# Patient Record
Sex: FEMALE | Race: WHITE | NOT HISPANIC OR LATINO | Employment: FULL TIME | URBAN - METROPOLITAN AREA
[De-identification: names, ages, dates, MRNs, and addresses within clinical notes are randomized per-mention and may not be internally consistent; named-entity substitution may affect disease eponyms.]

---

## 2021-03-18 ENCOUNTER — HOSPITAL ENCOUNTER (EMERGENCY)
Facility: HOSPITAL | Age: 31
Discharge: HOME | End: 2021-03-18
Attending: EMERGENCY MEDICINE | Admitting: EMERGENCY MEDICINE

## 2021-03-18 ENCOUNTER — APPOINTMENT (OUTPATIENT)
Dept: CT IMAGING | Facility: HOSPITAL | Age: 31
End: 2021-03-18

## 2021-03-18 ENCOUNTER — APPOINTMENT (OUTPATIENT)
Dept: ULTRASOUND IMAGING | Facility: HOSPITAL | Age: 31
End: 2021-03-18

## 2021-03-18 VITALS
HEART RATE: 75 BPM | RESPIRATION RATE: 18 BRPM | SYSTOLIC BLOOD PRESSURE: 139 MMHG | BODY MASS INDEX: 35.77 KG/M2 | WEIGHT: 227.9 LBS | TEMPERATURE: 97.8 F | DIASTOLIC BLOOD PRESSURE: 98 MMHG | HEIGHT: 67 IN | OXYGEN SATURATION: 97 %

## 2021-03-18 DIAGNOSIS — N93.9 VAGINAL BLEEDING: ICD-10-CM

## 2021-03-18 DIAGNOSIS — R10.2 PELVIC PAIN: Primary | ICD-10-CM

## 2021-03-18 DIAGNOSIS — N83.201 CYST OF RIGHT OVARY: ICD-10-CM

## 2021-03-18 LAB
ALBUMIN SERPL-MCNC: 4.3 G/DL (ref 3.5–5.2)
ALBUMIN/GLOB SERPL: 1.5 G/DL
ALP SERPL-CCNC: 58 U/L (ref 39–117)
ALT SERPL W P-5'-P-CCNC: 23 U/L (ref 1–33)
ANION GAP SERPL CALCULATED.3IONS-SCNC: 9.8 MMOL/L (ref 5–15)
AST SERPL-CCNC: 13 U/L (ref 1–32)
BACTERIA UR QL AUTO: ABNORMAL /HPF
BASOPHILS # BLD AUTO: 0.06 10*3/MM3 (ref 0–0.2)
BASOPHILS NFR BLD AUTO: 0.8 % (ref 0–1.5)
BILIRUB SERPL-MCNC: 0.5 MG/DL (ref 0–1.2)
BILIRUB UR QL STRIP: NEGATIVE
BUN SERPL-MCNC: 9 MG/DL (ref 6–20)
BUN/CREAT SERPL: 10.7 (ref 7–25)
CALCIUM SPEC-SCNC: 9.3 MG/DL (ref 8.6–10.5)
CHLORIDE SERPL-SCNC: 106 MMOL/L (ref 98–107)
CLARITY UR: CLEAR
CO2 SERPL-SCNC: 26.2 MMOL/L (ref 22–29)
COLOR UR: YELLOW
CREAT SERPL-MCNC: 0.84 MG/DL (ref 0.57–1)
DEPRECATED RDW RBC AUTO: 42.5 FL (ref 37–54)
EOSINOPHIL # BLD AUTO: 0.2 10*3/MM3 (ref 0–0.4)
EOSINOPHIL NFR BLD AUTO: 2.6 % (ref 0.3–6.2)
ERYTHROCYTE [DISTWIDTH] IN BLOOD BY AUTOMATED COUNT: 13.6 % (ref 12.3–15.4)
GFR SERPL CREATININE-BSD FRML MDRD: 79 ML/MIN/1.73
GLOBULIN UR ELPH-MCNC: 2.8 GM/DL
GLUCOSE SERPL-MCNC: 84 MG/DL (ref 65–99)
GLUCOSE UR STRIP-MCNC: NEGATIVE MG/DL
HCG SERPL QL: NEGATIVE
HCT VFR BLD AUTO: 41 % (ref 34–46.6)
HGB BLD-MCNC: 13.5 G/DL (ref 12–15.9)
HGB UR QL STRIP.AUTO: NEGATIVE
HOLD SPECIMEN: NORMAL
HYALINE CASTS UR QL AUTO: ABNORMAL /LPF
IMM GRANULOCYTES # BLD AUTO: 0.02 10*3/MM3 (ref 0–0.05)
IMM GRANULOCYTES NFR BLD AUTO: 0.3 % (ref 0–0.5)
KETONES UR QL STRIP: NEGATIVE
LEUKOCYTE ESTERASE UR QL STRIP.AUTO: ABNORMAL
LIPASE SERPL-CCNC: 34 U/L (ref 13–60)
LYMPHOCYTES # BLD AUTO: 1.9 10*3/MM3 (ref 0.7–3.1)
LYMPHOCYTES NFR BLD AUTO: 25.2 % (ref 19.6–45.3)
MCH RBC QN AUTO: 28.4 PG (ref 26.6–33)
MCHC RBC AUTO-ENTMCNC: 32.9 G/DL (ref 31.5–35.7)
MCV RBC AUTO: 86.1 FL (ref 79–97)
MONOCYTES # BLD AUTO: 0.52 10*3/MM3 (ref 0.1–0.9)
MONOCYTES NFR BLD AUTO: 6.9 % (ref 5–12)
NEUTROPHILS NFR BLD AUTO: 4.85 10*3/MM3 (ref 1.7–7)
NEUTROPHILS NFR BLD AUTO: 64.2 % (ref 42.7–76)
NITRITE UR QL STRIP: NEGATIVE
NRBC BLD AUTO-RTO: 0 /100 WBC (ref 0–0.2)
PH UR STRIP.AUTO: 7 [PH] (ref 5–8)
PLATELET # BLD AUTO: 224 10*3/MM3 (ref 140–450)
PMV BLD AUTO: 9.9 FL (ref 6–12)
POTASSIUM SERPL-SCNC: 4.4 MMOL/L (ref 3.5–5.2)
PROT SERPL-MCNC: 7.1 G/DL (ref 6–8.5)
PROT UR QL STRIP: NEGATIVE
RBC # BLD AUTO: 4.76 10*6/MM3 (ref 3.77–5.28)
RBC # UR: ABNORMAL /HPF
REF LAB TEST METHOD: ABNORMAL
SODIUM SERPL-SCNC: 142 MMOL/L (ref 136–145)
SP GR UR STRIP: 1.02 (ref 1–1.03)
SQUAMOUS #/AREA URNS HPF: ABNORMAL /HPF
UROBILINOGEN UR QL STRIP: ABNORMAL
WBC # BLD AUTO: 7.55 10*3/MM3 (ref 3.4–10.8)
WBC UR QL AUTO: ABNORMAL /HPF
WHOLE BLOOD HOLD SPECIMEN: NORMAL
WHOLE BLOOD HOLD SPECIMEN: NORMAL

## 2021-03-18 PROCEDURE — 74176 CT ABD & PELVIS W/O CONTRAST: CPT

## 2021-03-18 PROCEDURE — 76856 US EXAM PELVIC COMPLETE: CPT

## 2021-03-18 PROCEDURE — 36415 COLL VENOUS BLD VENIPUNCTURE: CPT | Performed by: EMERGENCY MEDICINE

## 2021-03-18 PROCEDURE — 76830 TRANSVAGINAL US NON-OB: CPT

## 2021-03-18 PROCEDURE — 96374 THER/PROPH/DIAG INJ IV PUSH: CPT

## 2021-03-18 PROCEDURE — 25010000002 ONDANSETRON PER 1 MG: Performed by: EMERGENCY MEDICINE

## 2021-03-18 PROCEDURE — 96375 TX/PRO/DX INJ NEW DRUG ADDON: CPT

## 2021-03-18 PROCEDURE — 25010000002 HYDROMORPHONE PER 4 MG: Performed by: EMERGENCY MEDICINE

## 2021-03-18 PROCEDURE — 25010000002 KETOROLAC TROMETHAMINE PER 15 MG: Performed by: EMERGENCY MEDICINE

## 2021-03-18 PROCEDURE — 96361 HYDRATE IV INFUSION ADD-ON: CPT

## 2021-03-18 PROCEDURE — 87491 CHLMYD TRACH DNA AMP PROBE: CPT | Performed by: EMERGENCY MEDICINE

## 2021-03-18 PROCEDURE — 99284 EMERGENCY DEPT VISIT MOD MDM: CPT

## 2021-03-18 PROCEDURE — 87591 N.GONORRHOEAE DNA AMP PROB: CPT | Performed by: EMERGENCY MEDICINE

## 2021-03-18 PROCEDURE — 83690 ASSAY OF LIPASE: CPT | Performed by: EMERGENCY MEDICINE

## 2021-03-18 PROCEDURE — 80053 COMPREHEN METABOLIC PANEL: CPT | Performed by: EMERGENCY MEDICINE

## 2021-03-18 PROCEDURE — 93976 VASCULAR STUDY: CPT

## 2021-03-18 PROCEDURE — 84703 CHORIONIC GONADOTROPIN ASSAY: CPT | Performed by: EMERGENCY MEDICINE

## 2021-03-18 PROCEDURE — 81001 URINALYSIS AUTO W/SCOPE: CPT | Performed by: EMERGENCY MEDICINE

## 2021-03-18 PROCEDURE — 85025 COMPLETE CBC W/AUTO DIFF WBC: CPT | Performed by: EMERGENCY MEDICINE

## 2021-03-18 RX ORDER — PHENTERMINE HYDROCHLORIDE 30 MG/1
30 CAPSULE ORAL EVERY MORNING
COMMUNITY
End: 2021-04-27

## 2021-03-18 RX ORDER — SODIUM CHLORIDE 0.9 % (FLUSH) 0.9 %
10 SYRINGE (ML) INJECTION AS NEEDED
Status: DISCONTINUED | OUTPATIENT
Start: 2021-03-18 | End: 2021-03-18 | Stop reason: HOSPADM

## 2021-03-18 RX ORDER — HYDROMORPHONE HYDROCHLORIDE 1 MG/ML
0.5 INJECTION, SOLUTION INTRAMUSCULAR; INTRAVENOUS; SUBCUTANEOUS ONCE
Status: COMPLETED | OUTPATIENT
Start: 2021-03-18 | End: 2021-03-18

## 2021-03-18 RX ORDER — ONDANSETRON 2 MG/ML
4 INJECTION INTRAMUSCULAR; INTRAVENOUS ONCE
Status: COMPLETED | OUTPATIENT
Start: 2021-03-18 | End: 2021-03-18

## 2021-03-18 RX ORDER — KETOROLAC TROMETHAMINE 15 MG/ML
15 INJECTION, SOLUTION INTRAMUSCULAR; INTRAVENOUS ONCE
Status: COMPLETED | OUTPATIENT
Start: 2021-03-18 | End: 2021-03-18

## 2021-03-18 RX ORDER — SODIUM CHLORIDE 9 MG/ML
125 INJECTION, SOLUTION INTRAVENOUS CONTINUOUS
Status: DISCONTINUED | OUTPATIENT
Start: 2021-03-18 | End: 2021-03-18 | Stop reason: HOSPADM

## 2021-03-18 RX ADMIN — ONDANSETRON 4 MG: 2 INJECTION INTRAMUSCULAR; INTRAVENOUS at 15:45

## 2021-03-18 RX ADMIN — KETOROLAC TROMETHAMINE 15 MG: 15 INJECTION, SOLUTION INTRAMUSCULAR; INTRAVENOUS at 17:18

## 2021-03-18 RX ADMIN — SODIUM CHLORIDE 125 ML/HR: 9 INJECTION, SOLUTION INTRAVENOUS at 18:28

## 2021-03-18 RX ADMIN — SODIUM CHLORIDE 1000 ML: 9 INJECTION, SOLUTION INTRAVENOUS at 15:45

## 2021-03-18 RX ADMIN — HYDROMORPHONE HYDROCHLORIDE 0.5 MG: 1 INJECTION, SOLUTION INTRAMUSCULAR; INTRAVENOUS; SUBCUTANEOUS at 15:46

## 2021-03-18 NOTE — ED NOTES
"Pt reports abdominal pain and swelling along with persistent vaginal bleeding (x30 days). She states her lower abdomen is \"swollen\" on both sides and she can feel a pulling sensation in her ovaries. Pt had a mass removed from her left ovary in 2007 \"the size of a grapefruit\", and she states this pain feels similar. Pt's OBGYN is Dr. Prakash but she has not seen anyone for these symptoms yet. She denies any possibility of pregnancy or STD. Patient was wearing a face mask when I entered the room and they continued to wear a mask throughout our encounter. I wore PPE including gloves, eye protection, and a surgical mask whenever I was in the room with patient. Hand hygiene performed.     Abhishek Deutsch RN  03/18/21 6723    "

## 2021-03-18 NOTE — ED TRIAGE NOTES
Pt states that for the last month she has had vaginal bleeding and lower abdominal pain. States that the bleeding varies from light to heavy, but that the pain is worse today.Pt has had a tumor removed from her left ovary in the past. Has seen with OBGYN with no relief. Patient masked at arrival and triage staff wore all appropriate PPE during entire encounter with patient.

## 2021-03-18 NOTE — ED TRIAGE NOTES
Pt reports having lower abd pain and vaginal bleeding for the last month. Pt denies other symptoms at this time. Pt has been seen by her OB with no relief.     Pt masked on arrival, staff masked

## 2021-03-18 NOTE — ED PROVIDER NOTES
" EMERGENCY DEPARTMENT ENCOUNTER    Room Number:  15/15  Date of encounter:  3/18/2021  PCP: Provider, No Known  Historian: Patient      HPI:  Chief Complaint: Pelvic pain  A complete HPI/ROS/PMH/PSH/SH/FH are unobtainable due to: Not applicable  Context: Dina Lyons is a 30 y.o. female who presents to the ED c/o Elbow pain this been occurring for approximately 1 month.  It is mainly suprapubic with radiation to the right and left lower pelvis.  It is described as sharp and stabbing.  It is a constant pain but will vary in severity.  She feels that it is worse at times when she moves or when she holds her urine.  It is better if she lays still and after she urinates.  She does not have dysuria she does not have urinary frequency.  She reports no fevers or chills.  She also reports vaginal bleeding this been occurring for approximately 1 month.  It is a little heavier than a menstrual period.  She has had a history of an ovarian cyst removal after that cyst became \"tumor\" in 2007.  She is also status post appendectomy and status post cholecystectomy.  She denies any vomiting or diarrhea.  Denies any vaginal discharge.  Patient's last sexual activity was in October or November 2020.  She does smoke.  She is tolerating p.o. liquids and solids.  She has tried to get in touch with her gynecologist in [place] 3 different times.  Every single time they call the inform her that they were unable to fit her in.        Previous Episodes: Yes in 2007 when she had an ovarian cyst and needed surgery.  Current Symptoms: See above    MEDICAL HISTORY REVIEWED    No old records in epic.  Also reviewed requested outside records tab and no old records records in [facility] in the [facility] as well.  PAST MEDICAL HISTORY  Active Ambulatory Problems     Diagnosis Date Noted   • No Active Ambulatory Problems     Resolved Ambulatory Problems     Diagnosis Date Noted   • No Resolved Ambulatory Problems     No Additional Past Medical " History         PAST SURGICAL HISTORY  Past Surgical History:   Procedure Laterality Date   • APPENDECTOMY  2006   • CHOLECYSTECTOMY  2006   • OVARY SURGERY           FAMILY HISTORY  History reviewed. No pertinent family history.      SOCIAL HISTORY  Social History     Socioeconomic History   • Marital status: Single     Spouse name: Not on file   • Number of children: Not on file   • Years of education: Not on file   • Highest education level: Not on file   Tobacco Use   • Smoking status: Current Every Day Smoker     Packs/day: 0.50     Years: 15.00     Pack years: 7.50     Types: Cigarettes   Substance and Sexual Activity   • Alcohol use: Yes     Comment: occasionally   • Drug use: Never   • Sexual activity: Not Currently     Birth control/protection: None         ALLERGIES  Contrast dye, Keflex [cephalexin], Levaquin [levofloxacin], Penicillins, and Sulfa antibiotics        REVIEW OF SYSTEMS  Review of Systems     All systems reviewed and negative except for those discussed in HPI.       PHYSICAL EXAM    I have reviewed the triage vital signs and nursing notes.    ED Triage Vitals   Temp Heart Rate Resp BP SpO2   03/18/21 1427 03/18/21 1427 03/18/21 1427 03/18/21 1436 03/18/21 1427   97.8 °F (36.6 °C) 95 20 155/94 99 %      Temp src Heart Rate Source Patient Position BP Location FiO2 (%)   03/18/21 1427 03/18/21 1427 03/18/21 1436 -- --   Tympanic Monitor Sitting         GENERAL: Female appears little uncomfortable.  No acute distress.Vital signs on my initial evaluation unremarkable  HENT: nares patent  Head/neck/ face are symmetric without gross deformity, signs of trauma, or swelling  EYES: no scleral icterus, no conjunctival pallor.  NECK: Supple, no meningismus  CV: regular rhythm, regular rate with intact distal pulses.  No murmur rub  RESPIRATORY: normal effort and no respiratory distress.  To auscultation bilaterally  ABDOMEN: soft and obese.  Tenderness is suprapubic and radiates to the left and right  lower pelvic region.  It is reproducible with palpation.  Normal inspection.  She has normal bowel sounds.  GENITOURINARY: Normal external female genitalia. Vaginal vault normal. No discharge patient has some dark blood in vaginal vault.  Cleaned blood off from the end of the cervix where there was a clot.  Has a trace amount of vaginal bleeding.  It is dark..  Has some mild adenexal tenderness.  Mild cervical motion tenderness.  Patient's pain mainly occurs with palpation of the suprapubic region on bimanual exam.  No obvious enlargement of the uterus. Cervical os closed.  MUSCULOSKELETAL: no deformity.  No edema.  Intact distal pulses to upper and lower extremities that are strong and equal.  NEURO: alert and appropriate, moves all extremities, follows commands.  Alert and oriented x3.  No focal motor or sensory changes.  SKIN: warm, dry    Vital signs and nursing notes reviewed.        LAB RESULTS  Recent Results (from the past 24 hour(s))   Urinalysis With Microscopic If Indicated (No Culture) - Urine, Clean Catch    Collection Time: 03/18/21  2:40 PM    Specimen: Urine, Clean Catch   Result Value Ref Range    Color, UA Yellow Yellow, Straw    Appearance, UA Clear Clear    pH, UA 7.0 5.0 - 8.0    Specific Gravity, UA 1.019 1.005 - 1.030    Glucose, UA Negative Negative    Ketones, UA Negative Negative    Bilirubin, UA Negative Negative    Blood, UA Negative Negative    Protein, UA Negative Negative    Leuk Esterase, UA Small (1+) (A) Negative    Nitrite, UA Negative Negative    Urobilinogen, UA 0.2 E.U./dL 0.2 - 1.0 E.U./dL   Urinalysis, Microscopic Only - Urine, Clean Catch    Collection Time: 03/18/21  2:40 PM    Specimen: Urine, Clean Catch   Result Value Ref Range    RBC, UA 0-2 None Seen, 0-2 /HPF    WBC, UA 13-20 (A) None Seen, 0-2 /HPF    Bacteria, UA None Seen None Seen /HPF    Squamous Epithelial Cells, UA 0-2 None Seen, 0-2 /HPF    Hyaline Casts, UA 0-2 None Seen /LPF    Methodology Automated  Microscopy    Comprehensive Metabolic Panel    Collection Time: 03/18/21  2:44 PM    Specimen: Blood   Result Value Ref Range    Glucose 84 65 - 99 mg/dL    BUN 9 6 - 20 mg/dL    Creatinine 0.84 0.57 - 1.00 mg/dL    Sodium 142 136 - 145 mmol/L    Potassium 4.4 3.5 - 5.2 mmol/L    Chloride 106 98 - 107 mmol/L    CO2 26.2 22.0 - 29.0 mmol/L    Calcium 9.3 8.6 - 10.5 mg/dL    Total Protein 7.1 6.0 - 8.5 g/dL    Albumin 4.30 3.50 - 5.20 g/dL    ALT (SGPT) 23 1 - 33 U/L    AST (SGOT) 13 1 - 32 U/L    Alkaline Phosphatase 58 39 - 117 U/L    Total Bilirubin 0.5 0.0 - 1.2 mg/dL    eGFR Non African Amer 79 >60 mL/min/1.73    Globulin 2.8 gm/dL    A/G Ratio 1.5 g/dL    BUN/Creatinine Ratio 10.7 7.0 - 25.0    Anion Gap 9.8 5.0 - 15.0 mmol/L   Lipase    Collection Time: 03/18/21  2:44 PM    Specimen: Blood   Result Value Ref Range    Lipase 34 13 - 60 U/L   hCG, Serum, Qualitative    Collection Time: 03/18/21  2:44 PM    Specimen: Blood   Result Value Ref Range    HCG Qualitative Negative Negative   Light Blue Top    Collection Time: 03/18/21  2:44 PM   Result Value Ref Range    Extra Tube hold for add-on    Green Top (Gel)    Collection Time: 03/18/21  2:44 PM   Result Value Ref Range    Extra Tube Hold for add-ons.    Lavender Top    Collection Time: 03/18/21  2:44 PM   Result Value Ref Range    Extra Tube hold for add-on    CBC Auto Differential    Collection Time: 03/18/21  2:44 PM    Specimen: Blood   Result Value Ref Range    WBC 7.55 3.40 - 10.80 10*3/mm3    RBC 4.76 3.77 - 5.28 10*6/mm3    Hemoglobin 13.5 12.0 - 15.9 g/dL    Hematocrit 41.0 34.0 - 46.6 %    MCV 86.1 79.0 - 97.0 fL    MCH 28.4 26.6 - 33.0 pg    MCHC 32.9 31.5 - 35.7 g/dL    RDW 13.6 12.3 - 15.4 %    RDW-SD 42.5 37.0 - 54.0 fl    MPV 9.9 6.0 - 12.0 fL    Platelets 224 140 - 450 10*3/mm3    Neutrophil % 64.2 42.7 - 76.0 %    Lymphocyte % 25.2 19.6 - 45.3 %    Monocyte % 6.9 5.0 - 12.0 %    Eosinophil % 2.6 0.3 - 6.2 %    Basophil % 0.8 0.0 - 1.5 %     Immature Grans % 0.3 0.0 - 0.5 %    Neutrophils, Absolute 4.85 1.70 - 7.00 10*3/mm3    Lymphocytes, Absolute 1.90 0.70 - 3.10 10*3/mm3    Monocytes, Absolute 0.52 0.10 - 0.90 10*3/mm3    Eosinophils, Absolute 0.20 0.00 - 0.40 10*3/mm3    Basophils, Absolute 0.06 0.00 - 0.20 10*3/mm3    Immature Grans, Absolute 0.02 0.00 - 0.05 10*3/mm3    nRBC 0.0 0.0 - 0.2 /100 WBC       Ordered the above labs and independently reviewed the results.        RADIOLOGY  CT Abdomen Pelvis Without Contrast    Result Date: 3/18/2021  CT ABDOMEN PELVIS WITHOUT CONTRAST-  Radiation dose reduction techniques were utilized, including automated exposure control and exposure modulation based on body size.  CLINICAL: Pelvic pain 30-year-old female, heavy menstrual period.  COMPARISON: None.  FINDINGS: Discrepancy in the size of the ovaries with the right ovary larger than the left measuring 2.9 x 4.1 cm compared to 1.6 x 2.2 cm. The uterus is anteverted, size and shape appropriate for age. The endometrial cavity appears within normal limits. There is a sizable liver lesion cyst measuring approximately 2 cm. Pelvic ultrasound if further evaluation is warranted. The bladder is collapsed, contour within normal limits. No free fluid in the pelvis.  The liver, pancreas and spleen have a satisfactory appearance. No biliary duct dilatation status post cholecystectomy. The adrenal glands are normal.  The kidneys are satisfactory in appearance allowing for the lack of intravenous contrast and the aorta is normal.  Postoperative change at the base of the cecum from previous appendectomy. Large and small bowel have a satisfactory appearance. No induration of the mesentery to suggest an inflammatory process. No free intraperitoneal air.  CONCLUSION: The right ovary is enlarged, ultrasound of the pelvis recommended as follow-up.  Findings of this report called to Dr. Trevino in the emergency room at the time of completion 4:45 PM.   This report was  finalized on 3/18/2021 6:05 PM by Dr. Ken Webb M.D.      US Pelvis Complete    Result Date: 3/18/2021  US PELVIS COMPLETE-  CLINICAL: Heavy menstrual bleeding, pelvic pain.  FINDINGS: The uterus measures 9.8 cm in length, 4.6 cm AP and 6 cm transverse. The endometrium is approximately 15 mm thick. In addition deep to the endometrium within the myometrium there is a small focus of endometrial echotexture measuring approximately 6 x 5 mm consistent with adenomyosis.  The right ovary measures 2.5 x 2.8 x 2.1 cm. The left ovary measures 2.9 x 1.2 x 2.6 cm. Arterial and venous waveforms interrogated from both ovaries. Ovarian echotexture within normal limits. No free fluid is demonstrated within the pelvis. There are Nabothian cysts within the cervix with the largest of these 9 mm. The remainder is unremarkable.  The findings called to Dr. Trevino in the emergency room at 5 PM.  This report was finalized on 3/18/2021 6:06 PM by Dr. Ken Webb M.D.        I ordered the above noted radiological studies. Reviewed by me and discussed with radiologist.  See dictation for official radiology interpretation.      PROCEDURES    Procedures      MEDICATIONS GIVEN IN ER    Medications   sodium chloride 0.9 % flush 10 mL (has no administration in time range)   sodium chloride 0.9 % infusion (125 mL/hr Intravenous New Bag 3/18/21 1828)   HYDROmorphone (DILAUDID) injection 0.5 mg (0.5 mg Intravenous Given 3/18/21 1546)   ondansetron (ZOFRAN) injection 4 mg (4 mg Intravenous Given 3/18/21 1545)   sodium chloride 0.9 % bolus 1,000 mL (0 mL Intravenous Stopped 3/18/21 1828)   ketorolac (TORADOL) injection 15 mg (15 mg Intravenous Given 3/18/21 1718)         PROGRESS, DATA ANALYSIS, CONSULTS, AND MEDICAL DECISION MAKING    Patient is requesting some pain medicine.  I informed patient of the test that we will order.  All questions answered at this time  We are currently under a pandemic from the COVID19 infection.  The patient  presented to the emergency department by ambulance or personal vehicle. I followed the current protocols required by Infection Control at [facility] in my evaluation and treatment of the patient. The patient was wearing a face mask during my evaluation and throughout my encounter. During my whole encounter with this patient I used appropriate personal protective equipment.  This equipment consisted of eye protection, facemask, gown, and gloves.  I applied this equipment before entering the room.  Differential diagnosis includes   -Mesenteric ischemia  - Pancreatitis  - Dyspepsia  - Small bowel or large bowel obstruction  - Diverticulitis  - UTI including pyelonephritis  - Ureteral stone  - Zoster  - Colitis, including infectious and ischemic  I think this is probably gynecological in origin.  I would like to do a CT of the abdomen pelvis.  As well as an ultrasound.  She is allergic to IV contrast.      All labs have been independently reviewed by me.  All radiology studies have been reviewed by me and discussed with radiologist dictating the report.   EKG's independently viewed and interpreted by me.  Discussion below represents my analysis of pertinent findings related to patient's condition, differential diagnosis, treatment plan and final disposition.      ED Course as of Mar 18 2014   Thu Mar 18, 2021   1609 I informed patient that I do not think she has gonorrhea or chlamydia.  The test that we obtained on the pelvic exam will take a couple days to come back.  We will notify her if it comes back positive.    [MM]   5779 I discussed the results of the CT scan of the abdomen pelvis with Dr. Webb.  No acute process other than right ovary appears enlarged it is 4.1 x 2.9 cm.  There is no free fluid.  He recommends that we do an ultrasound.  No obvious signs of any infection.  No obvious signs of torsion.    [MM]   1703 I talked with the ultrasound tech who performed the ultrasound.  The patient's  right ovary is mildly enlarged compared to the left.  But she has had surgery on the left ovary and it will be smaller because of that surgery.  Again that surgery was in 2007.  The ultrasound tech sees good flow to both ovaries.  No signs of torsion.  She does not appreciate any obvious signs of adenomyosis.  There is no free fluid.  It is a fairly benign pelvic ultrasound.  The endometrial lining is mildly thick.  But there is no blood or clots in the uterus or at the cervix.    [MM]   1914 Discussed the case with Dr. Jerome and he agrees to see as an outpatient.  I agree with that plan the patient is good to be discharged.  I called in because the patient's gynecologist in [place] was refusing to see her and is called her gynecologist 3 different times over the past month.    [MM]   2011 I talked with the patient at length about the results of the test.  I explained to her and apologized to her about the delay in the emergency department.  Informed her that I had 3 critical patients that arrived in the emergency department and they were occupying my time.  Explained to her that I spoke with the gynecologist Dr. Jerome.  The patient wants to change her gynecologist permanently.  No longer wants to see the gynecologist that she seen previously in [place].  I explained to the results of the test.  All questions were answered.  I also gave her warning signs to return to the emergency department if she develops any worsening pain, fever, bleeding more than 2 pads per hour, or any concerns.  I explained to her that the gonorrhea and chlamydia test is pending.  We will notify her if it comes back positive and she needs antibiotics.    [MM]   2013 Patient's repeat abdominal exam is benign.  There is no guarding or rebound.  She is doing better after the pain medicine.    [MM]      ED Course User Index  [MM] Kishore Trevino MD       AS OF 20:14 EDT VITALS:    BP - 135/75  HR - 73  TEMP - 97.8 °F (36.6 °C) (Tympanic)  02  SATS - 100%        DIAGNOSIS  Final diagnoses:   Pelvic pain   Vaginal bleeding   Cyst of right ovary         DISPOSITION  DISCHARGE    Patient discharged in stable condition.    Reviewed implications of results, diagnosis, meds, responsibility to follow up, warning signs and symptoms of possible worsening, potential complications and reasons to return to ER, including worsening of symptoms, vaginal bleeding more than 2 pads per hour, fever, worsening of pain, shortness of breath, any concerns..    Patient/Family voiced understanding of above instructions.    Discussed plan for discharge, as there is no emergent indication for admission. Pt/family is agreeable and understands need for follow up and repeat testing.  Pt is aware that discharge does not mean that nothing is wrong but it indicates no emergency is present that requires admission and they must continue care with follow-up as given below or physician of their choice.     FOLLOW-UP  José Miguel Jerome MD  [facility] [address]  [phone]      Call tomorrow morning to arrange appointment over the next week.  Return if worsening of pain, fever, vaginal bleeding more than 2 pads per hour, any concerns.         Medication List      No changes were made to your prescriptions during this visit.                  Kishore Trevino MD  03/18/21 2014

## 2021-03-19 LAB
C TRACH RRNA SPEC QL NAA+PROBE: NEGATIVE
N GONORRHOEA RRNA SPEC QL NAA+PROBE: NEGATIVE

## 2021-03-23 ENCOUNTER — OFFICE VISIT (OUTPATIENT)
Dept: OBSTETRICS AND GYNECOLOGY | Facility: CLINIC | Age: 31
End: 2021-03-23

## 2021-03-23 VITALS
HEIGHT: 67 IN | SYSTOLIC BLOOD PRESSURE: 130 MMHG | DIASTOLIC BLOOD PRESSURE: 80 MMHG | BODY MASS INDEX: 35.16 KG/M2 | WEIGHT: 224 LBS

## 2021-03-23 DIAGNOSIS — N93.9 ABNORMAL UTERINE BLEEDING (AUB): Primary | ICD-10-CM

## 2021-03-23 DIAGNOSIS — R10.2 PELVIC PAIN: ICD-10-CM

## 2021-03-23 DIAGNOSIS — R53.83 OTHER FATIGUE: ICD-10-CM

## 2021-03-23 PROCEDURE — 99204 OFFICE O/P NEW MOD 45 MIN: CPT | Performed by: NURSE PRACTITIONER

## 2021-03-23 RX ORDER — DOXYCYCLINE HYCLATE 100 MG/1
100 CAPSULE ORAL 2 TIMES DAILY
Qty: 28 CAPSULE | Refills: 0 | Status: SHIPPED | OUTPATIENT
Start: 2021-03-23 | End: 2021-03-28 | Stop reason: HOSPADM

## 2021-03-23 RX ORDER — IBUPROFEN 800 MG/1
800 TABLET ORAL EVERY 8 HOURS PRN
Qty: 45 TABLET | Refills: 0 | Status: SHIPPED | OUTPATIENT
Start: 2021-03-23 | End: 2021-04-27

## 2021-03-23 NOTE — PROGRESS NOTES
"Chief Complaint   Patient presents with   • Follow-up     New patient - e/r follow up for vaginal bleeding and pain.         SUBJECTIVE:     Dina Lyons is a 30 y.o. No obstetric history on file. who presents with several complaints. She is here for ED f/u for AUB and LLQ pain. Reports a \"tugging\" sensation on left side that worsens with activity, pain is improved with rest. Pain is also worsened by a full bladder. Pain has been an issue for one month, She states she is miserable due to pain. She requests pain medication to treat current symptoms. She has been taking motrin, reportedly 800mg every 2-3 hours with no improvement in symptoms. This is a new problem. LMP 2/23/21. Denies fevers, chills, N&V, low back pain, denies vaginal discharge, itching, or odor. She has had multiple abdominal surgeries    She has a hx PCOS and ovarian cysts. Reports dermoid cyst removed from left ovary in 2007.      She reports a 3 weeks hx of fatigue. She denies difficulty sleeping, she feels she is sleeping more than normal. She is working full time in a warehouse. She denies S&S of depression, but reports increased anxiety. Recently  from her spouse.     C/o AUB states she is going through 2 boxes of tampons in one week. She is not bleeding today, but reports episodes of bleeding every 2-3 days. She is not currently sexually active, last intercourse was in November. Denies new partners at that time.     She reports a hx of pyelonephritis that have required hospitalizations on more than one occasion. Denies constipation, diarrhea.     I have reviewed ED records and imaging from 3/18/21. Imaging indicates that right ovary is enlarged, left ovary is normal in appearnace    This is my first time meeting Dina Lyons  She is new to our office, she has had previous care in [place].   Past Medical History:   Diagnosis Date   • Ovarian cyst       Past Surgical History:   Procedure Laterality Date   • APPENDECTOMY  2006   • " " SECTION     • CHOLECYSTECTOMY  2006   • CYSTECTOMY     • GALLBLADDER SURGERY     • OVARY SURGERY        Social History     Tobacco Use   • Smoking status: Current Every Day Smoker     Packs/day: 0.50     Years: 15.00     Pack years: 7.50     Types: Cigarettes   Substance Use Topics   • Alcohol use: Yes     Comment: occasionally   • Drug use: Never     OB History   No obstetric history on file.        Review of Systems   Constitutional: Positive for fatigue. Negative for chills and fever.   Gastrointestinal: Positive for abdominal pain. Negative for abdominal distention, constipation, diarrhea, nausea and vomiting.   Genitourinary: Positive for menstrual problem, pelvic pain and vaginal bleeding. Negative for dyspareunia, dysuria, frequency, genital sores, vaginal discharge and vaginal pain.   Musculoskeletal: Negative for back pain and gait problem.   Skin: Negative for rash.   Neurological: Negative for dizziness and headaches.   Hematological: Does not bruise/bleed easily.   Psychiatric/Behavioral: Negative for behavioral problems.       OBJECTIVE:   Vitals:    21 1148   BP: 130/80   Weight: 102 kg (224 lb)   Height: 170.2 cm (67\")        Physical Exam  Vitals and nursing note reviewed.   Constitutional:       General: She is not in acute distress.     Appearance: Normal appearance. She is obese. She is not ill-appearing, toxic-appearing or diaphoretic.      Comments: Moved from sitting to laying and laying to sitting with no assistance, difficulty, or facial grimace.    HENT:      Head: Normocephalic and atraumatic.   Eyes:      Pupils: Pupils are equal, round, and reactive to light.   Cardiovascular:      Rate and Rhythm: Normal rate.   Pulmonary:      Effort: Pulmonary effort is normal.   Abdominal:      General: There is no distension.      Palpations: Abdomen is soft. There is no mass.      Tenderness: There is no abdominal tenderness. There is no right CVA tenderness, left CVA tenderness, " guarding or rebound.      Hernia: No hernia is present. There is no hernia in the left inguinal area or right inguinal area.   Genitourinary:     General: Normal vulva.      Exam position: Lithotomy position.      Pubic Area: No rash or pubic lice.       Labia:         Right: No rash, tenderness, lesion or injury.         Left: No rash, tenderness, lesion or injury.       Urethra: No prolapse, urethral pain, urethral swelling or urethral lesion.      Vagina: No signs of injury and foreign body. No vaginal discharge, erythema, tenderness, bleeding, lesions or prolapsed vaginal walls.      Cervix: No cervical motion tenderness, discharge, friability, lesion, erythema, cervical bleeding or eversion.      Uterus: Tender. Not deviated, not enlarged, not fixed and no uterine prolapse.       Adnexa:         Right: Tenderness present. No mass or fullness.          Left: Tenderness present. No mass or fullness.     Musculoskeletal:         General: Normal range of motion.      Cervical back: Normal range of motion.   Lymphadenopathy:      Lower Body: No right inguinal adenopathy. No left inguinal adenopathy.   Skin:     General: Skin is warm and dry.   Neurological:      General: No focal deficit present.      Mental Status: She is alert and oriented to person, place, and time.      Cranial Nerves: No cranial nerve deficit.   Psychiatric:         Mood and Affect: Mood normal.         Behavior: Behavior normal.         Thought Content: Thought content normal.         Judgment: Judgment normal.       ASSESSMENT:   1) AUB  2) Pelvic pain  3) Fatigue    PLAN:   Pt is not ill appearing, she moves from sitting to laying and laying to sitting with no difficulty, assistance, or facial grimace  However there is tenderness with palpation of uterus and RLQ, concerned for PID, doxycycline sent to pharmacy  [facility], cultures obtained for ureaplasma, mycoplasma  CBC, TSH, prolactin, and A1C collected today  Reviewed appropriate dosing of  motrin, discussed risks of GI bleeding with excessive use  Motrin 800mg Q8 hours sent to pharmacy. Advised to add 1000 mg tylenol as needed, no more than 4 doses in one day  Advised to f/u in ED with any worsening pain, fevers, chills, or N&V  Advised 1 week f/u     Follow up:1 week, PRN worsening symptoms    Pt called 3/24/21 and requested a note to be off of work until next appt on 3/30/21. She was given a work excuse through 3/26/21 she will call if pain is still an issue 3/29/21. Again advised to f/u in ED if any worsening pain, fevers, chills, or N&V      Keila Huber, APRN  3/23/2021  12:01 EDT

## 2021-03-24 ENCOUNTER — APPOINTMENT (OUTPATIENT)
Dept: ULTRASOUND IMAGING | Facility: HOSPITAL | Age: 31
End: 2021-03-24

## 2021-03-24 ENCOUNTER — HOSPITAL ENCOUNTER (INPATIENT)
Facility: HOSPITAL | Age: 31
LOS: 4 days | Discharge: HOME | End: 2021-03-28
Attending: EMERGENCY MEDICINE | Admitting: INTERNAL MEDICINE

## 2021-03-24 DIAGNOSIS — R10.32 LEFT LOWER QUADRANT ABDOMINAL PAIN: Primary | ICD-10-CM

## 2021-03-24 LAB
ALBUMIN SERPL-MCNC: 4.4 G/DL (ref 3.5–5.2)
ALBUMIN/GLOB SERPL: 1.6 G/DL
ALP SERPL-CCNC: 84 U/L (ref 39–117)
ALT SERPL W P-5'-P-CCNC: 143 U/L (ref 1–33)
ANION GAP SERPL CALCULATED.3IONS-SCNC: 7.9 MMOL/L (ref 5–15)
AST SERPL-CCNC: 59 U/L (ref 1–32)
BACTERIA UR QL AUTO: ABNORMAL /HPF
BASOPHILS # BLD AUTO: 0.06 10*3/MM3 (ref 0–0.2)
BASOPHILS # BLD AUTO: 0.08 10*3/MM3 (ref 0–0.2)
BASOPHILS NFR BLD AUTO: 0.8 % (ref 0–1.5)
BASOPHILS NFR BLD AUTO: 1 % (ref 0–1.5)
BILIRUB SERPL-MCNC: 0.7 MG/DL (ref 0–1.2)
BILIRUB UR QL STRIP: NEGATIVE
BUN SERPL-MCNC: 7 MG/DL (ref 6–20)
BUN/CREAT SERPL: 11.7 (ref 7–25)
CALCIUM SPEC-SCNC: 8.8 MG/DL (ref 8.6–10.5)
CHLORIDE SERPL-SCNC: 106 MMOL/L (ref 98–107)
CLARITY UR: CLEAR
CO2 SERPL-SCNC: 25.1 MMOL/L (ref 22–29)
COLOR UR: ABNORMAL
CREAT SERPL-MCNC: 0.6 MG/DL (ref 0.57–1)
DEPRECATED RDW RBC AUTO: 43.5 FL (ref 37–54)
EOSINOPHIL # BLD AUTO: 0.18 10*3/MM3 (ref 0–0.4)
EOSINOPHIL # BLD AUTO: 0.2 10*3/MM3 (ref 0–0.4)
EOSINOPHIL NFR BLD AUTO: 2.5 % (ref 0.3–6.2)
EOSINOPHIL NFR BLD AUTO: 2.5 % (ref 0.3–6.2)
ERYTHROCYTE [DISTWIDTH] IN BLOOD BY AUTOMATED COUNT: 13.6 % (ref 12.3–15.4)
ERYTHROCYTE [DISTWIDTH] IN BLOOD BY AUTOMATED COUNT: 13.7 % (ref 12.3–15.4)
GFR SERPL CREATININE-BSD FRML MDRD: 117 ML/MIN/1.73
GLOBULIN UR ELPH-MCNC: 2.8 GM/DL
GLUCOSE SERPL-MCNC: 117 MG/DL (ref 65–99)
GLUCOSE UR STRIP-MCNC: NEGATIVE MG/DL
HBA1C MFR BLD: 5.3 % (ref 4.8–5.6)
HCG INTACT+B SERPL-ACNC: <0.5 MIU/ML
HCT VFR BLD AUTO: 41.2 % (ref 34–46.6)
HCT VFR BLD AUTO: 42.9 % (ref 34–46.6)
HGB BLD-MCNC: 13.2 G/DL (ref 12–15.9)
HGB BLD-MCNC: 13.9 G/DL (ref 12–15.9)
HGB UR QL STRIP.AUTO: NEGATIVE
HOLD SPECIMEN: NORMAL
HOLD SPECIMEN: NORMAL
HYALINE CASTS UR QL AUTO: ABNORMAL /LPF
IMM GRANULOCYTES # BLD AUTO: 0.02 10*3/MM3 (ref 0–0.05)
IMM GRANULOCYTES # BLD AUTO: 0.04 10*3/MM3 (ref 0–0.05)
IMM GRANULOCYTES NFR BLD AUTO: 0.3 % (ref 0–0.5)
IMM GRANULOCYTES NFR BLD AUTO: 0.5 % (ref 0–0.5)
KETONES UR QL STRIP: ABNORMAL
LEUKOCYTE ESTERASE UR QL STRIP.AUTO: ABNORMAL
LYMPHOCYTES # BLD AUTO: 1.99 10*3/MM3 (ref 0.7–3.1)
LYMPHOCYTES # BLD AUTO: 2.35 10*3/MM3 (ref 0.7–3.1)
LYMPHOCYTES NFR BLD AUTO: 24.9 % (ref 19.6–45.3)
LYMPHOCYTES NFR BLD AUTO: 32.4 % (ref 19.6–45.3)
MCH RBC QN AUTO: 27.7 PG (ref 26.6–33)
MCH RBC QN AUTO: 27.8 PG (ref 26.6–33)
MCHC RBC AUTO-ENTMCNC: 32 G/DL (ref 31.5–35.7)
MCHC RBC AUTO-ENTMCNC: 32.4 G/DL (ref 31.5–35.7)
MCV RBC AUTO: 85.8 FL (ref 79–97)
MCV RBC AUTO: 86.6 FL (ref 79–97)
MONOCYTES # BLD AUTO: 0.38 10*3/MM3 (ref 0.1–0.9)
MONOCYTES # BLD AUTO: 0.58 10*3/MM3 (ref 0.1–0.9)
MONOCYTES NFR BLD AUTO: 5.2 % (ref 5–12)
MONOCYTES NFR BLD AUTO: 7.3 % (ref 5–12)
NEUTROPHILS # BLD AUTO: 5.11 10*3/MM3 (ref 1.7–7)
NEUTROPHILS NFR BLD AUTO: 4.26 10*3/MM3 (ref 1.7–7)
NEUTROPHILS NFR BLD AUTO: 58.8 % (ref 42.7–76)
NEUTROPHILS NFR BLD AUTO: 63.8 % (ref 42.7–76)
NITRITE UR QL STRIP: NEGATIVE
NRBC BLD AUTO-RTO: 0 /100 WBC (ref 0–0.2)
NRBC BLD AUTO-RTO: 0 /100 WBC (ref 0–0.2)
PH UR STRIP.AUTO: <=5 [PH] (ref 5–8)
PLATELET # BLD AUTO: 265 10*3/MM3 (ref 140–450)
PLATELET # BLD AUTO: 283 10*3/MM3 (ref 140–450)
PMV BLD AUTO: 10.1 FL (ref 6–12)
POTASSIUM SERPL-SCNC: 3.7 MMOL/L (ref 3.5–5.2)
PROLACTIN SERPL-MCNC: 13.3 NG/ML (ref 4.8–23.3)
PROT SERPL-MCNC: 7.2 G/DL (ref 6–8.5)
PROT UR QL STRIP: ABNORMAL
RBC # BLD AUTO: 4.76 10*6/MM3 (ref 3.77–5.28)
RBC # BLD AUTO: 5 10*6/MM3 (ref 3.77–5.28)
RBC # UR: ABNORMAL /HPF
REF LAB TEST METHOD: ABNORMAL
SARS-COV-2 ORF1AB RESP QL NAA+PROBE: NOT DETECTED
SODIUM SERPL-SCNC: 139 MMOL/L (ref 136–145)
SP GR UR STRIP: >=1.03 (ref 1–1.03)
SQUAMOUS #/AREA URNS HPF: ABNORMAL /HPF
TSH SERPL DL<=0.005 MIU/L-ACNC: 1.51 UIU/ML (ref 0.27–4.2)
UROBILINOGEN UR QL STRIP: ABNORMAL
WBC # BLD AUTO: 7.25 10*3/MM3 (ref 3.4–10.8)
WBC # BLD AUTO: 8 10*3/MM3 (ref 3.4–10.8)
WBC UR QL AUTO: ABNORMAL /HPF
WHOLE BLOOD HOLD SPECIMEN: NORMAL
WHOLE BLOOD HOLD SPECIMEN: NORMAL

## 2021-03-24 PROCEDURE — 25010000002 KETOROLAC TROMETHAMINE PER 15 MG: Performed by: INTERNAL MEDICINE

## 2021-03-24 PROCEDURE — G0378 HOSPITAL OBSERVATION PER HR: HCPCS

## 2021-03-24 PROCEDURE — 80307 DRUG TEST PRSMV CHEM ANLYZR: CPT | Performed by: INTERNAL MEDICINE

## 2021-03-24 PROCEDURE — 87086 URINE CULTURE/COLONY COUNT: CPT | Performed by: EMERGENCY MEDICINE

## 2021-03-24 PROCEDURE — 25010000002 MORPHINE PER 10 MG: Performed by: NURSE PRACTITIONER

## 2021-03-24 PROCEDURE — 84702 CHORIONIC GONADOTROPIN TEST: CPT | Performed by: EMERGENCY MEDICINE

## 2021-03-24 PROCEDURE — 93976 VASCULAR STUDY: CPT

## 2021-03-24 PROCEDURE — 81001 URINALYSIS AUTO W/SCOPE: CPT | Performed by: EMERGENCY MEDICINE

## 2021-03-24 PROCEDURE — 25010000002 HYDROMORPHONE PER 4 MG: Performed by: EMERGENCY MEDICINE

## 2021-03-24 PROCEDURE — 25010000002 HYDROMORPHONE PER 4 MG: Performed by: INTERNAL MEDICINE

## 2021-03-24 PROCEDURE — 85025 COMPLETE CBC W/AUTO DIFF WBC: CPT | Performed by: NURSE PRACTITIONER

## 2021-03-24 PROCEDURE — 80053 COMPREHEN METABOLIC PANEL: CPT | Performed by: NURSE PRACTITIONER

## 2021-03-24 PROCEDURE — 76830 TRANSVAGINAL US NON-OB: CPT

## 2021-03-24 PROCEDURE — 76856 US EXAM PELVIC COMPLETE: CPT

## 2021-03-24 PROCEDURE — U0004 COV-19 TEST NON-CDC HGH THRU: HCPCS | Performed by: EMERGENCY MEDICINE

## 2021-03-24 PROCEDURE — 99285 EMERGENCY DEPT VISIT HI MDM: CPT

## 2021-03-24 PROCEDURE — 25010000002 ONDANSETRON PER 1 MG: Performed by: NURSE PRACTITIONER

## 2021-03-24 RX ORDER — SODIUM CHLORIDE 0.9 % (FLUSH) 0.9 %
10 SYRINGE (ML) INJECTION AS NEEDED
Status: DISCONTINUED | OUTPATIENT
Start: 2021-03-24 | End: 2021-03-28 | Stop reason: HOSPADM

## 2021-03-24 RX ORDER — HYDROMORPHONE HYDROCHLORIDE 1 MG/ML
0.5 INJECTION, SOLUTION INTRAMUSCULAR; INTRAVENOUS; SUBCUTANEOUS ONCE
Status: COMPLETED | OUTPATIENT
Start: 2021-03-24 | End: 2021-03-24

## 2021-03-24 RX ORDER — SODIUM CHLORIDE 9 MG/ML
75 INJECTION, SOLUTION INTRAVENOUS CONTINUOUS
Status: DISCONTINUED | OUTPATIENT
Start: 2021-03-24 | End: 2021-03-28 | Stop reason: HOSPADM

## 2021-03-24 RX ORDER — ACETAMINOPHEN 325 MG/1
650 TABLET ORAL EVERY 4 HOURS PRN
Status: DISCONTINUED | OUTPATIENT
Start: 2021-03-24 | End: 2021-03-28 | Stop reason: HOSPADM

## 2021-03-24 RX ORDER — ONDANSETRON 2 MG/ML
4 INJECTION INTRAMUSCULAR; INTRAVENOUS ONCE
Status: COMPLETED | OUTPATIENT
Start: 2021-03-24 | End: 2021-03-24

## 2021-03-24 RX ORDER — HYDROMORPHONE HYDROCHLORIDE 1 MG/ML
0.5 INJECTION, SOLUTION INTRAMUSCULAR; INTRAVENOUS; SUBCUTANEOUS EVERY 4 HOURS PRN
Status: DISCONTINUED | OUTPATIENT
Start: 2021-03-24 | End: 2021-03-27

## 2021-03-24 RX ORDER — KETOROLAC TROMETHAMINE 15 MG/ML
15 INJECTION, SOLUTION INTRAMUSCULAR; INTRAVENOUS EVERY 6 HOURS PRN
Status: DISCONTINUED | OUTPATIENT
Start: 2021-03-24 | End: 2021-03-28 | Stop reason: HOSPADM

## 2021-03-24 RX ORDER — ACETAMINOPHEN 650 MG/1
650 SUPPOSITORY RECTAL EVERY 4 HOURS PRN
Status: DISCONTINUED | OUTPATIENT
Start: 2021-03-24 | End: 2021-03-28 | Stop reason: HOSPADM

## 2021-03-24 RX ORDER — MORPHINE SULFATE 2 MG/ML
4 INJECTION, SOLUTION INTRAMUSCULAR; INTRAVENOUS ONCE
Status: COMPLETED | OUTPATIENT
Start: 2021-03-24 | End: 2021-03-24

## 2021-03-24 RX ORDER — ACETAMINOPHEN 160 MG/5ML
650 SOLUTION ORAL EVERY 4 HOURS PRN
Status: DISCONTINUED | OUTPATIENT
Start: 2021-03-24 | End: 2021-03-28 | Stop reason: HOSPADM

## 2021-03-24 RX ORDER — ONDANSETRON 2 MG/ML
4 INJECTION INTRAMUSCULAR; INTRAVENOUS EVERY 6 HOURS PRN
Status: DISCONTINUED | OUTPATIENT
Start: 2021-03-24 | End: 2021-03-28 | Stop reason: HOSPADM

## 2021-03-24 RX ADMIN — ONDANSETRON 4 MG: 2 INJECTION INTRAMUSCULAR; INTRAVENOUS at 11:59

## 2021-03-24 RX ADMIN — SODIUM CHLORIDE 1000 ML: 9 INJECTION, SOLUTION INTRAVENOUS at 11:56

## 2021-03-24 RX ADMIN — HYDROMORPHONE HYDROCHLORIDE 0.5 MG: 1 INJECTION, SOLUTION INTRAMUSCULAR; INTRAVENOUS; SUBCUTANEOUS at 18:16

## 2021-03-24 RX ADMIN — HYDROMORPHONE HYDROCHLORIDE 0.5 MG: 1 INJECTION, SOLUTION INTRAMUSCULAR; INTRAVENOUS; SUBCUTANEOUS at 12:52

## 2021-03-24 RX ADMIN — KETOROLAC TROMETHAMINE 15 MG: 15 INJECTION, SOLUTION INTRAMUSCULAR; INTRAVENOUS at 21:40

## 2021-03-24 RX ADMIN — SODIUM CHLORIDE 75 ML/HR: 9 INJECTION, SOLUTION INTRAVENOUS at 18:06

## 2021-03-24 RX ADMIN — MORPHINE SULFATE 4 MG: 2 INJECTION, SOLUTION INTRAMUSCULAR; INTRAVENOUS at 11:59

## 2021-03-25 LAB
A VAGINAE DNA VAG QL NAA+PROBE: ABNORMAL SCORE
ANION GAP SERPL CALCULATED.3IONS-SCNC: 8.8 MMOL/L (ref 5–15)
BACTERIA SPEC AEROBE CULT: NORMAL
BACTERIA UR QL AUTO: ABNORMAL /HPF
BASOPHILS # BLD AUTO: 0.05 10*3/MM3 (ref 0–0.2)
BASOPHILS NFR BLD AUTO: 1 % (ref 0–1.5)
BILIRUB UR QL STRIP: NEGATIVE
BUN SERPL-MCNC: 6 MG/DL (ref 6–20)
BUN/CREAT SERPL: 9.4 (ref 7–25)
BVAB2 DNA VAG QL NAA+PROBE: ABNORMAL SCORE
C ALBICANS DNA VAG QL NAA+PROBE: NEGATIVE
C GLABRATA DNA VAG QL NAA+PROBE: NEGATIVE
C TRACH DNA VAG QL NAA+PROBE: NEGATIVE
CALCIUM SPEC-SCNC: 7.8 MG/DL (ref 8.6–10.5)
CHLORIDE SERPL-SCNC: 109 MMOL/L (ref 98–107)
CLARITY UR: CLEAR
CO2 SERPL-SCNC: 23.2 MMOL/L (ref 22–29)
COLOR UR: YELLOW
CREAT SERPL-MCNC: 0.64 MG/DL (ref 0.57–1)
DEPRECATED RDW RBC AUTO: 43.8 FL (ref 37–54)
EOSINOPHIL # BLD AUTO: 0.2 10*3/MM3 (ref 0–0.4)
EOSINOPHIL NFR BLD AUTO: 3.9 % (ref 0.3–6.2)
ERYTHROCYTE [DISTWIDTH] IN BLOOD BY AUTOMATED COUNT: 13.6 % (ref 12.3–15.4)
GFR SERPL CREATININE-BSD FRML MDRD: 108 ML/MIN/1.73
GLUCOSE SERPL-MCNC: 82 MG/DL (ref 65–99)
GLUCOSE UR STRIP-MCNC: NEGATIVE MG/DL
HCT VFR BLD AUTO: 36.6 % (ref 34–46.6)
HGB BLD-MCNC: 12.1 G/DL (ref 12–15.9)
HGB UR QL STRIP.AUTO: NEGATIVE
HYALINE CASTS UR QL AUTO: ABNORMAL /LPF
IMM GRANULOCYTES # BLD AUTO: 0.01 10*3/MM3 (ref 0–0.05)
IMM GRANULOCYTES NFR BLD AUTO: 0.2 % (ref 0–0.5)
KETONES UR QL STRIP: NEGATIVE
LEUKOCYTE ESTERASE UR QL STRIP.AUTO: ABNORMAL
LYMPHOCYTES # BLD AUTO: 2.09 10*3/MM3 (ref 0.7–3.1)
LYMPHOCYTES NFR BLD AUTO: 40.8 % (ref 19.6–45.3)
MCH RBC QN AUTO: 29.1 PG (ref 26.6–33)
MCHC RBC AUTO-ENTMCNC: 33.1 G/DL (ref 31.5–35.7)
MCV RBC AUTO: 88 FL (ref 79–97)
MEGA1 DNA VAG QL NAA+PROBE: ABNORMAL SCORE
MONOCYTES # BLD AUTO: 0.42 10*3/MM3 (ref 0.1–0.9)
MONOCYTES NFR BLD AUTO: 8.2 % (ref 5–12)
N GONORRHOEA DNA VAG QL NAA+PROBE: NEGATIVE
NEUTROPHILS NFR BLD AUTO: 2.35 10*3/MM3 (ref 1.7–7)
NEUTROPHILS NFR BLD AUTO: 45.9 % (ref 42.7–76)
NITRITE UR QL STRIP: NEGATIVE
NRBC BLD AUTO-RTO: 0 /100 WBC (ref 0–0.2)
PH UR STRIP.AUTO: 6 [PH] (ref 5–8)
PLATELET # BLD AUTO: 216 10*3/MM3 (ref 140–450)
PMV BLD AUTO: 10.3 FL (ref 6–12)
POTASSIUM SERPL-SCNC: 4.2 MMOL/L (ref 3.5–5.2)
PROT UR QL STRIP: NEGATIVE
RBC # BLD AUTO: 4.16 10*6/MM3 (ref 3.77–5.28)
RBC # UR: ABNORMAL /HPF
REF LAB TEST METHOD: ABNORMAL
SODIUM SERPL-SCNC: 141 MMOL/L (ref 136–145)
SP GR UR STRIP: 1.02 (ref 1–1.03)
SQUAMOUS #/AREA URNS HPF: ABNORMAL /HPF
T VAGINALIS DNA VAG QL NAA+PROBE: POSITIVE
UROBILINOGEN UR QL STRIP: ABNORMAL
WBC # BLD AUTO: 5.12 10*3/MM3 (ref 3.4–10.8)
WBC UR QL AUTO: ABNORMAL /HPF

## 2021-03-25 PROCEDURE — 81001 URINALYSIS AUTO W/SCOPE: CPT | Performed by: INTERNAL MEDICINE

## 2021-03-25 PROCEDURE — 25010000002 KETOROLAC TROMETHAMINE PER 15 MG: Performed by: INTERNAL MEDICINE

## 2021-03-25 PROCEDURE — G0378 HOSPITAL OBSERVATION PER HR: HCPCS

## 2021-03-25 PROCEDURE — 25010000002 ONDANSETRON PER 1 MG: Performed by: INTERNAL MEDICINE

## 2021-03-25 PROCEDURE — 99243 OFF/OP CNSLTJ NEW/EST LOW 30: CPT | Performed by: OBSTETRICS & GYNECOLOGY

## 2021-03-25 PROCEDURE — 80048 BASIC METABOLIC PNL TOTAL CA: CPT | Performed by: INTERNAL MEDICINE

## 2021-03-25 PROCEDURE — 25010000002 HYDROMORPHONE PER 4 MG: Performed by: INTERNAL MEDICINE

## 2021-03-25 PROCEDURE — 87086 URINE CULTURE/COLONY COUNT: CPT | Performed by: INTERNAL MEDICINE

## 2021-03-25 PROCEDURE — 85025 COMPLETE CBC W/AUTO DIFF WBC: CPT | Performed by: INTERNAL MEDICINE

## 2021-03-25 RX ORDER — IBUPROFEN 400 MG/1
800 TABLET ORAL EVERY 8 HOURS PRN
Status: DISCONTINUED | OUTPATIENT
Start: 2021-03-25 | End: 2021-03-28 | Stop reason: HOSPADM

## 2021-03-25 RX ORDER — DOXYCYCLINE 100 MG/1
100 CAPSULE ORAL EVERY 12 HOURS SCHEDULED
Status: DISCONTINUED | OUTPATIENT
Start: 2021-03-25 | End: 2021-03-26

## 2021-03-25 RX ADMIN — HYDROMORPHONE HYDROCHLORIDE 0.5 MG: 1 INJECTION, SOLUTION INTRAMUSCULAR; INTRAVENOUS; SUBCUTANEOUS at 22:19

## 2021-03-25 RX ADMIN — ONDANSETRON 4 MG: 2 INJECTION INTRAMUSCULAR; INTRAVENOUS at 13:02

## 2021-03-25 RX ADMIN — HYDROMORPHONE HYDROCHLORIDE 0.5 MG: 1 INJECTION, SOLUTION INTRAMUSCULAR; INTRAVENOUS; SUBCUTANEOUS at 17:05

## 2021-03-25 RX ADMIN — KETOROLAC TROMETHAMINE 15 MG: 15 INJECTION, SOLUTION INTRAMUSCULAR; INTRAVENOUS at 05:18

## 2021-03-25 RX ADMIN — HYDROMORPHONE HYDROCHLORIDE 0.5 MG: 1 INJECTION, SOLUTION INTRAMUSCULAR; INTRAVENOUS; SUBCUTANEOUS at 09:11

## 2021-03-25 RX ADMIN — SODIUM CHLORIDE 75 ML/HR: 9 INJECTION, SOLUTION INTRAVENOUS at 05:21

## 2021-03-25 RX ADMIN — DOXYCYCLINE 100 MG: 100 CAPSULE ORAL at 20:01

## 2021-03-25 RX ADMIN — IBUPROFEN 800 MG: 400 TABLET, FILM COATED ORAL at 20:01

## 2021-03-25 RX ADMIN — HYDROMORPHONE HYDROCHLORIDE 0.5 MG: 1 INJECTION, SOLUTION INTRAMUSCULAR; INTRAVENOUS; SUBCUTANEOUS at 02:20

## 2021-03-25 RX ADMIN — SODIUM CHLORIDE 75 ML/HR: 9 INJECTION, SOLUTION INTRAVENOUS at 17:56

## 2021-03-25 RX ADMIN — KETOROLAC TROMETHAMINE 15 MG: 15 INJECTION, SOLUTION INTRAMUSCULAR; INTRAVENOUS at 13:02

## 2021-03-26 ENCOUNTER — DOCUMENTATION (OUTPATIENT)
Dept: OBSTETRICS AND GYNECOLOGY | Facility: CLINIC | Age: 31
End: 2021-03-26

## 2021-03-26 LAB
ALBUMIN SERPL-MCNC: 3.3 G/DL (ref 3.5–5.2)
AMPHET+METHAMPHET UR QL: NEGATIVE
ANION GAP SERPL CALCULATED.3IONS-SCNC: 7.4 MMOL/L (ref 5–15)
BACTERIA SPEC AEROBE CULT: NO GROWTH
BARBITURATES UR QL SCN: NEGATIVE
BENZODIAZ UR QL SCN: NEGATIVE
BUN SERPL-MCNC: 4 MG/DL (ref 6–20)
BUN/CREAT SERPL: 7 (ref 7–25)
CALCIUM SPEC-SCNC: 8.2 MG/DL (ref 8.6–10.5)
CANNABINOIDS SERPL QL: POSITIVE
CHLORIDE SERPL-SCNC: 113 MMOL/L (ref 98–107)
CO2 SERPL-SCNC: 19.6 MMOL/L (ref 22–29)
COCAINE UR QL: NEGATIVE
CREAT SERPL-MCNC: 0.57 MG/DL (ref 0.57–1)
DEPRECATED RDW RBC AUTO: 42 FL (ref 37–54)
ERYTHROCYTE [DISTWIDTH] IN BLOOD BY AUTOMATED COUNT: 13.6 % (ref 12.3–15.4)
GFR SERPL CREATININE-BSD FRML MDRD: 124 ML/MIN/1.73
GLUCOSE SERPL-MCNC: 120 MG/DL (ref 65–99)
HCT VFR BLD AUTO: 36.2 % (ref 34–46.6)
HGB BLD-MCNC: 12 G/DL (ref 12–15.9)
MAGNESIUM SERPL-MCNC: 2 MG/DL (ref 1.6–2.6)
MCH RBC QN AUTO: 28.8 PG (ref 26.6–33)
MCHC RBC AUTO-ENTMCNC: 33.1 G/DL (ref 31.5–35.7)
MCV RBC AUTO: 86.8 FL (ref 79–97)
METHADONE UR QL SCN: NEGATIVE
OPIATES UR QL: NEGATIVE
OXYCODONE UR QL SCN: NEGATIVE
PHOSPHATE SERPL-MCNC: 2.5 MG/DL (ref 2.5–4.5)
PLATELET # BLD AUTO: 192 10*3/MM3 (ref 140–450)
PMV BLD AUTO: 10.1 FL (ref 6–12)
POTASSIUM SERPL-SCNC: 4 MMOL/L (ref 3.5–5.2)
RBC # BLD AUTO: 4.17 10*6/MM3 (ref 3.77–5.28)
SODIUM SERPL-SCNC: 140 MMOL/L (ref 136–145)
WBC # BLD AUTO: 4.5 10*3/MM3 (ref 3.4–10.8)

## 2021-03-26 PROCEDURE — 99254 IP/OBS CNSLTJ NEW/EST MOD 60: CPT | Performed by: INTERNAL MEDICINE

## 2021-03-26 PROCEDURE — 80069 RENAL FUNCTION PANEL: CPT | Performed by: INTERNAL MEDICINE

## 2021-03-26 PROCEDURE — 83735 ASSAY OF MAGNESIUM: CPT | Performed by: INTERNAL MEDICINE

## 2021-03-26 PROCEDURE — 25010000002 KETOROLAC TROMETHAMINE PER 15 MG: Performed by: INTERNAL MEDICINE

## 2021-03-26 PROCEDURE — 25010000002 PROCHLORPERAZINE 10 MG/2ML SOLUTION: Performed by: INTERNAL MEDICINE

## 2021-03-26 PROCEDURE — 25010000002 HYDROMORPHONE PER 4 MG: Performed by: INTERNAL MEDICINE

## 2021-03-26 PROCEDURE — 85027 COMPLETE CBC AUTOMATED: CPT | Performed by: INTERNAL MEDICINE

## 2021-03-26 PROCEDURE — 25010000002 ONDANSETRON PER 1 MG: Performed by: INTERNAL MEDICINE

## 2021-03-26 PROCEDURE — 99231 SBSQ HOSP IP/OBS SF/LOW 25: CPT | Performed by: OBSTETRICS & GYNECOLOGY

## 2021-03-26 RX ORDER — DOXYCYCLINE 100 MG/1
100 CAPSULE ORAL EVERY 12 HOURS SCHEDULED
Status: DISCONTINUED | OUTPATIENT
Start: 2021-03-26 | End: 2021-03-28 | Stop reason: HOSPADM

## 2021-03-26 RX ORDER — METAXALONE 800 MG/1
400 TABLET ORAL 3 TIMES DAILY
Status: DISCONTINUED | OUTPATIENT
Start: 2021-03-26 | End: 2021-03-28

## 2021-03-26 RX ORDER — DICYCLOMINE HYDROCHLORIDE 10 MG/1
20 CAPSULE ORAL 4 TIMES DAILY
Status: DISCONTINUED | OUTPATIENT
Start: 2021-03-26 | End: 2021-03-28

## 2021-03-26 RX ORDER — METRONIDAZOLE 500 MG/1
2000 TABLET ORAL ONCE
Status: COMPLETED | OUTPATIENT
Start: 2021-03-26 | End: 2021-03-26

## 2021-03-26 RX ORDER — PROCHLORPERAZINE EDISYLATE 5 MG/ML
10 INJECTION INTRAMUSCULAR; INTRAVENOUS EVERY 6 HOURS PRN
Status: DISCONTINUED | OUTPATIENT
Start: 2021-03-26 | End: 2021-03-28 | Stop reason: HOSPADM

## 2021-03-26 RX ORDER — LIDOCAINE 50 MG/G
1 PATCH TOPICAL
Status: DISCONTINUED | OUTPATIENT
Start: 2021-03-26 | End: 2021-03-28

## 2021-03-26 RX ORDER — HYDROCODONE BITARTRATE AND ACETAMINOPHEN 7.5; 325 MG/1; MG/1
1 TABLET ORAL EVERY 4 HOURS PRN
Status: DISCONTINUED | OUTPATIENT
Start: 2021-03-26 | End: 2021-03-28 | Stop reason: HOSPADM

## 2021-03-26 RX ADMIN — METAXALONE 400 MG: 800 TABLET ORAL at 22:11

## 2021-03-26 RX ADMIN — ONDANSETRON 4 MG: 2 INJECTION INTRAMUSCULAR; INTRAVENOUS at 21:20

## 2021-03-26 RX ADMIN — DICYCLOMINE HYDROCHLORIDE 20 MG: 10 CAPSULE ORAL at 12:31

## 2021-03-26 RX ADMIN — METAXALONE 400 MG: 800 TABLET ORAL at 15:53

## 2021-03-26 RX ADMIN — PROCHLORPERAZINE EDISYLATE 10 MG: 5 INJECTION INTRAMUSCULAR; INTRAVENOUS at 17:03

## 2021-03-26 RX ADMIN — ONDANSETRON 4 MG: 2 INJECTION INTRAMUSCULAR; INTRAVENOUS at 13:34

## 2021-03-26 RX ADMIN — DOXYCYCLINE 100 MG: 100 CAPSULE ORAL at 08:38

## 2021-03-26 RX ADMIN — HYDROCODONE BITARTRATE AND ACETAMINOPHEN 1 TABLET: 7.5; 325 TABLET ORAL at 10:44

## 2021-03-26 RX ADMIN — DICYCLOMINE HYDROCHLORIDE 20 MG: 10 CAPSULE ORAL at 23:38

## 2021-03-26 RX ADMIN — SODIUM CHLORIDE 75 ML/HR: 9 INJECTION, SOLUTION INTRAVENOUS at 08:48

## 2021-03-26 RX ADMIN — KETOROLAC TROMETHAMINE 15 MG: 15 INJECTION, SOLUTION INTRAMUSCULAR; INTRAVENOUS at 11:41

## 2021-03-26 RX ADMIN — DOXYCYCLINE 100 MG: 100 CAPSULE ORAL at 22:11

## 2021-03-26 RX ADMIN — SODIUM CHLORIDE 75 ML/HR: 9 INJECTION, SOLUTION INTRAVENOUS at 22:15

## 2021-03-26 RX ADMIN — IBUPROFEN 800 MG: 400 TABLET, FILM COATED ORAL at 06:22

## 2021-03-26 RX ADMIN — METRONIDAZOLE 2000 MG: 500 TABLET, FILM COATED ORAL at 13:30

## 2021-03-26 RX ADMIN — HYDROMORPHONE HYDROCHLORIDE 0.5 MG: 1 INJECTION, SOLUTION INTRAMUSCULAR; INTRAVENOUS; SUBCUTANEOUS at 08:38

## 2021-03-26 RX ADMIN — DICYCLOMINE HYDROCHLORIDE 20 MG: 10 CAPSULE ORAL at 17:02

## 2021-03-26 RX ADMIN — HYDROMORPHONE HYDROCHLORIDE 0.5 MG: 1 INJECTION, SOLUTION INTRAMUSCULAR; INTRAVENOUS; SUBCUTANEOUS at 12:33

## 2021-03-26 RX ADMIN — LIDOCAINE 1 PATCH: 50 PATCH TOPICAL at 17:03

## 2021-03-26 NOTE — PROGRESS NOTES
Dina Lyons [facility] returned + trichomonas and BV. Dr Walker has been consulted on this case as she is currently inpatient. I have reviewed results with Dr Walker.    Keila Huber, APRN  3/26/21  3:52pm

## 2021-03-27 ENCOUNTER — APPOINTMENT (OUTPATIENT)
Dept: CT IMAGING | Facility: HOSPITAL | Age: 31
End: 2021-03-27

## 2021-03-27 PROCEDURE — 99232 SBSQ HOSP IP/OBS MODERATE 35: CPT | Performed by: STUDENT IN AN ORGANIZED HEALTH CARE EDUCATION/TRAINING PROGRAM

## 2021-03-27 PROCEDURE — 99231 SBSQ HOSP IP/OBS SF/LOW 25: CPT | Performed by: INTERNAL MEDICINE

## 2021-03-27 PROCEDURE — 0 DIATRIZOATE MEGLUMINE & SODIUM PER 1 ML: Performed by: INTERNAL MEDICINE

## 2021-03-27 PROCEDURE — 25010000002 PROCHLORPERAZINE 10 MG/2ML SOLUTION: Performed by: INTERNAL MEDICINE

## 2021-03-27 PROCEDURE — 25010000002 KETOROLAC TROMETHAMINE PER 15 MG: Performed by: INTERNAL MEDICINE

## 2021-03-27 PROCEDURE — 25010000002 IOPAMIDOL 61 % SOLUTION: Performed by: INTERNAL MEDICINE

## 2021-03-27 PROCEDURE — 25010000002 ONDANSETRON PER 1 MG: Performed by: INTERNAL MEDICINE

## 2021-03-27 PROCEDURE — 25010000002 DEXAMETHASONE SODIUM PHOSPHATE 10 MG/ML SOLUTION: Performed by: INTERNAL MEDICINE

## 2021-03-27 PROCEDURE — 25010000002 DIPHENHYDRAMINE PER 50 MG: Performed by: INTERNAL MEDICINE

## 2021-03-27 PROCEDURE — 74177 CT ABD & PELVIS W/CONTRAST: CPT

## 2021-03-27 RX ORDER — DIPHENHYDRAMINE HYDROCHLORIDE 50 MG/ML
25 INJECTION INTRAMUSCULAR; INTRAVENOUS ONCE
Status: DISCONTINUED | OUTPATIENT
Start: 2021-03-27 | End: 2021-03-27

## 2021-03-27 RX ORDER — DIPHENHYDRAMINE HYDROCHLORIDE 50 MG/ML
50 INJECTION INTRAMUSCULAR; INTRAVENOUS EVERY 6 HOURS PRN
Status: DISCONTINUED | OUTPATIENT
Start: 2021-03-27 | End: 2021-03-27

## 2021-03-27 RX ORDER — DIPHENHYDRAMINE HYDROCHLORIDE 50 MG/ML
50 INJECTION INTRAMUSCULAR; INTRAVENOUS ONCE
Status: COMPLETED | OUTPATIENT
Start: 2021-03-27 | End: 2021-03-27

## 2021-03-27 RX ORDER — DEXAMETHASONE SODIUM PHOSPHATE 10 MG/ML
8 INJECTION, SOLUTION INTRAMUSCULAR; INTRAVENOUS ONCE
Status: COMPLETED | OUTPATIENT
Start: 2021-03-27 | End: 2021-03-27

## 2021-03-27 RX ADMIN — DICYCLOMINE HYDROCHLORIDE 20 MG: 10 CAPSULE ORAL at 18:04

## 2021-03-27 RX ADMIN — LIDOCAINE 1 PATCH: 50 PATCH TOPICAL at 18:04

## 2021-03-27 RX ADMIN — ONDANSETRON 4 MG: 2 INJECTION INTRAMUSCULAR; INTRAVENOUS at 08:53

## 2021-03-27 RX ADMIN — METAXALONE 400 MG: 800 TABLET ORAL at 08:54

## 2021-03-27 RX ADMIN — SODIUM CHLORIDE 75 ML/HR: 9 INJECTION, SOLUTION INTRAVENOUS at 12:40

## 2021-03-27 RX ADMIN — METAXALONE 400 MG: 800 TABLET ORAL at 20:53

## 2021-03-27 RX ADMIN — METAXALONE 400 MG: 800 TABLET ORAL at 18:00

## 2021-03-27 RX ADMIN — DOXYCYCLINE 100 MG: 100 CAPSULE ORAL at 08:53

## 2021-03-27 RX ADMIN — HYDROCODONE BITARTRATE AND ACETAMINOPHEN 1 TABLET: 7.5; 325 TABLET ORAL at 08:53

## 2021-03-27 RX ADMIN — DICYCLOMINE HYDROCHLORIDE 20 MG: 10 CAPSULE ORAL at 20:48

## 2021-03-27 RX ADMIN — HYDROCODONE BITARTRATE AND ACETAMINOPHEN 1 TABLET: 7.5; 325 TABLET ORAL at 12:39

## 2021-03-27 RX ADMIN — DICYCLOMINE HYDROCHLORIDE 20 MG: 10 CAPSULE ORAL at 08:54

## 2021-03-27 RX ADMIN — HYDROCODONE BITARTRATE AND ACETAMINOPHEN 1 TABLET: 7.5; 325 TABLET ORAL at 02:23

## 2021-03-27 RX ADMIN — ONDANSETRON 4 MG: 2 INJECTION INTRAMUSCULAR; INTRAVENOUS at 20:54

## 2021-03-27 RX ADMIN — KETOROLAC TROMETHAMINE 15 MG: 15 INJECTION, SOLUTION INTRAMUSCULAR; INTRAVENOUS at 04:46

## 2021-03-27 RX ADMIN — IOPAMIDOL 100 ML: 612 INJECTION, SOLUTION INTRAVENOUS at 15:26

## 2021-03-27 RX ADMIN — HYDROCODONE BITARTRATE AND ACETAMINOPHEN 1 TABLET: 7.5; 325 TABLET ORAL at 20:53

## 2021-03-27 RX ADMIN — DEXAMETHASONE SODIUM PHOSPHATE 8 MG: 10 INJECTION, SOLUTION INTRAMUSCULAR; INTRAVENOUS at 14:22

## 2021-03-27 RX ADMIN — DIPHENHYDRAMINE HYDROCHLORIDE 50 MG: 50 INJECTION, SOLUTION INTRAMUSCULAR; INTRAVENOUS at 14:21

## 2021-03-27 RX ADMIN — DOXYCYCLINE 100 MG: 100 CAPSULE ORAL at 20:53

## 2021-03-27 RX ADMIN — DIATRIZOATE MEGLUMINE AND DIATRIZOATE SODIUM 30 ML: 600; 100 SOLUTION ORAL; RECTAL at 14:21

## 2021-03-27 RX ADMIN — PROCHLORPERAZINE EDISYLATE 10 MG: 5 INJECTION INTRAMUSCULAR; INTRAVENOUS at 12:39

## 2021-03-27 RX ADMIN — IBUPROFEN 800 MG: 400 TABLET, FILM COATED ORAL at 23:58

## 2021-03-27 RX ADMIN — DICYCLOMINE HYDROCHLORIDE 20 MG: 10 CAPSULE ORAL at 12:39

## 2021-03-28 VITALS
WEIGHT: 225.8 LBS | DIASTOLIC BLOOD PRESSURE: 66 MMHG | SYSTOLIC BLOOD PRESSURE: 124 MMHG | BODY MASS INDEX: 35.44 KG/M2 | HEIGHT: 67 IN | RESPIRATION RATE: 18 BRPM | OXYGEN SATURATION: 97 % | HEART RATE: 55 BPM | TEMPERATURE: 97.6 F

## 2021-03-28 PROCEDURE — 25010000002 ONDANSETRON PER 1 MG: Performed by: INTERNAL MEDICINE

## 2021-03-28 PROCEDURE — 99231 SBSQ HOSP IP/OBS SF/LOW 25: CPT | Performed by: OBSTETRICS & GYNECOLOGY

## 2021-03-28 PROCEDURE — 25010000002 PROCHLORPERAZINE 10 MG/2ML SOLUTION: Performed by: INTERNAL MEDICINE

## 2021-03-28 RX ORDER — DOXYCYCLINE HYCLATE 100 MG/1
100 TABLET, DELAYED RELEASE ORAL 2 TIMES DAILY
Qty: 10 TABLET | Refills: 0 | Status: SHIPPED | OUTPATIENT
Start: 2021-03-28 | End: 2021-04-02

## 2021-03-28 RX ORDER — HYDROCODONE BITARTRATE AND ACETAMINOPHEN 7.5; 325 MG/1; MG/1
1 TABLET ORAL EVERY 4 HOURS PRN
Qty: 18 TABLET | Refills: 0 | Status: SHIPPED | OUTPATIENT
Start: 2021-03-28 | End: 2021-04-02

## 2021-03-28 RX ADMIN — HYDROCODONE BITARTRATE AND ACETAMINOPHEN 1 TABLET: 7.5; 325 TABLET ORAL at 10:29

## 2021-03-28 RX ADMIN — IBUPROFEN 800 MG: 400 TABLET, FILM COATED ORAL at 10:29

## 2021-03-28 RX ADMIN — DOXYCYCLINE 100 MG: 100 CAPSULE ORAL at 08:32

## 2021-03-28 RX ADMIN — METAXALONE 400 MG: 800 TABLET ORAL at 08:24

## 2021-03-28 RX ADMIN — ONDANSETRON 4 MG: 2 INJECTION INTRAMUSCULAR; INTRAVENOUS at 05:10

## 2021-03-28 RX ADMIN — PROCHLORPERAZINE EDISYLATE 10 MG: 5 INJECTION INTRAMUSCULAR; INTRAVENOUS at 08:32

## 2021-03-28 RX ADMIN — DICYCLOMINE HYDROCHLORIDE 20 MG: 10 CAPSULE ORAL at 08:24

## 2021-03-28 RX ADMIN — HYDROCODONE BITARTRATE AND ACETAMINOPHEN 1 TABLET: 7.5; 325 TABLET ORAL at 05:06

## 2021-03-28 RX ADMIN — SODIUM CHLORIDE 75 ML/HR: 9 INJECTION, SOLUTION INTRAVENOUS at 05:03

## 2021-03-29 ENCOUNTER — TELEPHONE (OUTPATIENT)
Dept: OBSTETRICS AND GYNECOLOGY | Facility: CLINIC | Age: 31
End: 2021-03-29

## 2021-03-29 ENCOUNTER — READMISSION MANAGEMENT (OUTPATIENT)
Dept: CALL CENTER | Facility: HOSPITAL | Age: 31
End: 2021-03-29

## 2021-03-29 LAB
M HOMINIS SPEC QL CULT: POSITIVE
U UREALYTICUM SPEC QL CULT: POSITIVE

## 2021-03-29 NOTE — TELEPHONE ENCOUNTER
I called Dina Lyons to review ureaplasma and mycoplasma results with pt, both are positive. She is currently on doxycycline. Reviewed NuSwab+ positive for trichomonas, she is currently being treated, discussed 6-8 KAN.    She states her pain is not improved, she is tearful during our conversation. She was seen by GI also during her admission, but GI did not find any cause for this pain. She states she is missing work as a result of LLQ pain. She has a scheduled f/u tomorrow with Dr Proctor tomorrow. Advised to keep scheduled appt.    Keila Huber, APRN  3/29/21  1:36pm

## 2021-03-29 NOTE — OUTREACH NOTE
Prep Survey      Responses   [facility] facility patient discharged from?  [place]   Is LACE score < 7 ?  No   Emergency Room discharge w/ pulse ox?  No   Eligibility  Readm Mgmt   Discharge diagnosis  Left lower quadrant abdominal pain, suspect GYN related possibly musculoskeletal   Does the patient have one of the following disease processes/diagnoses(primary or secondary)?  Other   Does the patient have Home health ordered?  No   Is there a DME ordered?  No   Prep survey completed?  Yes          Zenia Causey RN

## 2021-03-30 ENCOUNTER — OFFICE VISIT (OUTPATIENT)
Dept: OBSTETRICS AND GYNECOLOGY | Facility: CLINIC | Age: 31
End: 2021-03-30

## 2021-03-30 ENCOUNTER — READMISSION MANAGEMENT (OUTPATIENT)
Dept: CALL CENTER | Facility: HOSPITAL | Age: 31
End: 2021-03-30

## 2021-03-30 VITALS
SYSTOLIC BLOOD PRESSURE: 140 MMHG | WEIGHT: 234 LBS | DIASTOLIC BLOOD PRESSURE: 80 MMHG | HEIGHT: 67 IN | BODY MASS INDEX: 36.73 KG/M2

## 2021-03-30 DIAGNOSIS — R10.2 PELVIC PAIN IN FEMALE: Primary | ICD-10-CM

## 2021-03-30 PROCEDURE — 99213 OFFICE O/P EST LOW 20 MIN: CPT | Performed by: OBSTETRICS & GYNECOLOGY

## 2021-03-30 NOTE — OUTREACH NOTE
Medical Week 1 Survey      Responses   [facility] patient discharged from?  [place]   Does the patient have one of the following disease processes/diagnoses(primary or secondary)?  Other   Week 1 attempt successful?  Yes   Call start time  1212   Call end time  1216   Discharge diagnosis  Left lower quadrant abdominal pain, suspect GYN related possibly musculoskeletal   Meds reviewed with patient/caregiver?  Yes   Is the patient having any side effects they believe may be caused by any medication additions or changes?  No   Does the patient have all medications ordered at discharge?  Yes   Is the patient taking all medications as directed (includes completed medication regime)?  Yes   Does the patient have a primary care provider?   No   Does the patient have an appointment with their PCP within 7 days of discharge?  No   What is preventing the patient from scheduling follow up appointments within 7 days of discharge?  Haven't had time   Has the patient kept scheduled appointments due by today?  Yes   Comments  seen gyno today   Psychosocial issues?  No   Did the patient receive a copy of their discharge instructions?  Yes   Nursing interventions  Reviewed instructions with patient   What is the patient's perception of their health status since discharge?  Same   Is the patient/caregiver able to teach back signs and symptoms related to disease process for when to call PCP?  Yes   Is the patient/caregiver able to teach back signs and symptoms related to disease process for when to call 911?  Yes   Is the patient/caregiver able to teach back the hierarchy of who to call/visit for symptoms/problems? PCP, Specialist, Home health nurse, Urgent Care, ED, 911  Yes   If the patient is a current smoker, are they able to teach back resources for cessation?  Not a smoker   Week 1 call completed?  Yes   Wrap up additional comments  pt stated she is still in pain and is going to GYN today at 3.           Lilly M  THELMA Garcia

## 2021-03-30 NOTE — PROGRESS NOTES
"        REASON FOR FOLLOWUP/CHIEF COMPLAINT: Worsening left pelvic pain     HISTORY OF PRESENT ILLNESS: Patient is seen on follow-up from her recent admission to the hospital from about 3/24 and discharged on 3/28.  She had negative CT scan regarding the pelvis as well as normal appearing pelvic ultrasound.  She did have bacterial vaginosis and trichomonas and was treated with the appropriate antibiotics.  She states the pain began about a month ago and has been constant and appears to be getting worse.  She describes it as sharp and throbbing.  She states the pain is always on the left and does not radiate.  She only got relief with Norco 7.5 mg in the hospital.  She does not describe any aggravating factors.  Her history is significant for having had a  but also having had a large dermoid cystic mass removed from her left ovary in .  This was done through a Pfannenstiel incision.      Past Medical History, Past Surgical History, Social History, Family History have been reviewed and are without significant changes except as mentioned.    Review of Systems   Review of Systems   Constitutional: Negative for fever.   Gastrointestinal: Negative for abdominal distention and vomiting.   Genitourinary: Positive for pelvic pain. Negative for vaginal bleeding.   Neurological: Negative for syncope.       Medications:  The current medication list was reviewed in the EMR    ALLERGIES:    Allergies   Allergen Reactions   • Contrast Dye Hives     Patient has tolerated contrast dye when pretreated with benadryl   • Keflex [Cephalexin] Hives   • Levaquin [Levofloxacin] Swelling   • Penicillins Hives   • Sulfa Antibiotics Hives              Vitals:    21 1547   BP: 140/80   Weight: 106 kg (234 lb)   Height: 170.2 cm (67.01\")     Physical Exam    CONSTITUTIONAL:  Vital signs reviewed.  She is tearful and clutching her left lower abdomen.  EYES:  Conjunctiva and lids unremarkable.  PERRLA  EARS,NOSE,MOUTH,THROAT:  " Ears and nose appear unremarkable.  Lips, teeth, gums appear unremarkable.  RESPIRATORY:  Normal respiratory effort  CARDIOVASCULAR:  Normal S1, S2.  No murmurs rubs or gallops.  No significant lower extremity edema.  GASTROINTESTINAL: Abdomen appears unremarkable.  Nontender.  No hepatomegaly.  No splenomegaly.  Guarding in the left lower quadrant but no rebound tenderness.  NEURO: cranial nerves 2-12 grossly intact.  No focal deficits.  Appears to have equal strength all 4 extremities.  MUSCULOSKELETAL:  Unremarkable digits/nails.  No cyanosis or clubbing.  SKIN:  Warm.  No rashes.  PSYCHIATRIC:  Normal judgment and insight.  Normal mood and affect.       RECENT LABS:  WBC   Date Value Ref Range Status   03/26/2021 4.50 3.40 - 10.80 10*3/mm3 Final   03/25/2021 5.12 3.40 - 10.80 10*3/mm3 Final   03/24/2021 7.25 3.40 - 10.80 10*3/mm3 Final   03/23/2021 8.00 3.40 - 10.80 10*3/mm3 Final   03/18/2021 7.55 3.40 - 10.80 10*3/mm3 Final     Hemoglobin   Date Value Ref Range Status   03/26/2021 12.0 12.0 - 15.9 g/dL Final   03/25/2021 12.1 12.0 - 15.9 g/dL Final   03/24/2021 13.2 12.0 - 15.9 g/dL Final   03/23/2021 13.9 12.0 - 15.9 g/dL Final   03/18/2021 13.5 12.0 - 15.9 g/dL Final     Platelets   Date Value Ref Range Status   03/26/2021 192 140 - 450 10*3/mm3 Final   03/25/2021 216 140 - 450 10*3/mm3 Final   03/24/2021 265 140 - 450 10*3/mm3 Final   03/23/2021 283 140 - 450 10*3/mm3 Final   03/18/2021 224 140 - 450 10*3/mm3 Final       ASSESSMENT/PLAN:  Persistent and worsening left pelvic pain.  Risk factors include her previous pelvic surgery.  The patient strongly desires to move toward a more definitive assessment.  I discussed moving ahead with laparoscopy with possible left salpingo-oophorectomy.  Patient states that she does not want to have anymore children and even inquires about having a hysterectomy.  I told her I did not think that that was appropriate at this time nor was it indicated.  I did think going with  a laparoscopy and if findings suggest, a left salpingo-oophorectomy may be warranted.  Risks of bleeding, infection, injury to other organs were all described to the patient.  She desires to proceed with scheduling.  Dina Lyons Room/bed info not found

## 2021-04-02 ENCOUNTER — TELEPHONE (OUTPATIENT)
Dept: OBSTETRICS AND GYNECOLOGY | Facility: CLINIC | Age: 31
End: 2021-04-02

## 2021-04-05 ENCOUNTER — TELEPHONE (OUTPATIENT)
Dept: OBSTETRICS AND GYNECOLOGY | Facility: CLINIC | Age: 31
End: 2021-04-05

## 2021-04-05 DIAGNOSIS — R10.2 PELVIC PAIN: Primary | ICD-10-CM

## 2021-04-05 RX ORDER — TRAMADOL HYDROCHLORIDE 50 MG/1
50 TABLET ORAL EVERY 6 HOURS PRN
Qty: 20 TABLET | Refills: 0 | Status: SHIPPED | OUTPATIENT
Start: 2021-04-05 | End: 2021-04-27

## 2021-04-05 NOTE — TELEPHONE ENCOUNTER
Good afternoon,  Patient called to see if she could get a refill of her pain medication to last until her surgery.    Please advise,  Thank you    Pharmacy confirmed- [facility] [address] [place] [address], [place] - [address] [facility] [phone] [facility] [phone] FX

## 2021-04-08 ENCOUNTER — READMISSION MANAGEMENT (OUTPATIENT)
Dept: CALL CENTER | Facility: HOSPITAL | Age: 31
End: 2021-04-08

## 2021-04-08 NOTE — OUTREACH NOTE
Medical Week 2 Survey      Responses   [facility] patient discharged from?  [place]   Does the patient have one of the following disease processes/diagnoses(primary or secondary)?  Other   Week 2 attempt successful?  Yes   Call start time  1019   Discharge diagnosis  Left lower quadrant abdominal pain, suspect GYN related possibly musculoskeletal   Call end time  1028   Meds reviewed with patient/caregiver?  Yes   Is the patient taking all medications as directed (includes completed medication regime)?  Yes   Medication comments  Tramadol 50mg, #20 ordered for pain on 4/5/21   Has the patient kept scheduled appointments due by today?  Yes   What is the patient's perception of their health status since discharge?  Same   Week 2 Call Completed?  Yes   Wrap up additional comments  Patient rates pain 7/10.   She has called surgeon, is on vacation this week. Office has ordered ibuprofen 800mg, is not helping.  She has no PCP.  Plan is for salpingoopherectomy as soon as can be scheduled due to ovarian tumor.           Radha Tejada RN

## 2021-04-15 ENCOUNTER — READMISSION MANAGEMENT (OUTPATIENT)
Dept: CALL CENTER | Facility: HOSPITAL | Age: 31
End: 2021-04-15

## 2021-04-15 NOTE — OUTREACH NOTE
Medical Week 3 Survey      Responses   [facility] patient discharged from?  [place]   Does the patient have one of the following disease processes/diagnoses(primary or secondary)?  Other   Week 3 attempt successful?  No   Unsuccessful attempts  Attempt 1          Radha Tejada RN

## 2021-04-20 ENCOUNTER — READMISSION MANAGEMENT (OUTPATIENT)
Dept: CALL CENTER | Facility: HOSPITAL | Age: 31
End: 2021-04-20

## 2021-04-20 NOTE — OUTREACH NOTE
Medical Week 3 Survey      Responses   [facility] patient discharged from?  [place]   Does the patient have one of the following disease processes/diagnoses(primary or secondary)?  Other   Week 3 attempt successful?  Yes   Call start time  0948   Call end time  0951   Meds reviewed with patient/caregiver?  Yes   Is the patient taking all medications as directed (includes completed medication regime)?  Yes   Has the patient kept scheduled appointments due by today?  Yes   Comments  left tube and left ovary scheduled 4/29 for removal, pain is not really controlled using tylenol for pain control   What is the patient's perception of their health status since discharge?  Same   Week 3 Call Completed?  Yes   Wrap up additional comments  surgery scheduled on 4/29 for salpingoopherectomy on left, pain still an issue, only giving her tylenol she says, no change, no bleeding she states, no questions          India Barrios, RN

## 2021-04-27 ENCOUNTER — PRE-ADMISSION TESTING (OUTPATIENT)
Dept: PREADMISSION TESTING | Facility: HOSPITAL | Age: 31
End: 2021-04-27

## 2021-04-27 VITALS
DIASTOLIC BLOOD PRESSURE: 89 MMHG | OXYGEN SATURATION: 96 % | RESPIRATION RATE: 16 BRPM | HEART RATE: 116 BPM | SYSTOLIC BLOOD PRESSURE: 138 MMHG | WEIGHT: 227.1 LBS | BODY MASS INDEX: 35.64 KG/M2 | TEMPERATURE: 98.9 F | HEIGHT: 67 IN

## 2021-04-27 DIAGNOSIS — R10.2 PELVIC PAIN IN FEMALE: ICD-10-CM

## 2021-04-27 LAB
ABO GROUP BLD: NORMAL
ANION GAP SERPL CALCULATED.3IONS-SCNC: 13.2 MMOL/L (ref 5–15)
BLD GP AB SCN SERPL QL: NEGATIVE
BUN SERPL-MCNC: 7 MG/DL (ref 6–20)
BUN/CREAT SERPL: 10.9 (ref 7–25)
CALCIUM SPEC-SCNC: 9.2 MG/DL (ref 8.6–10.5)
CHLORIDE SERPL-SCNC: 106 MMOL/L (ref 98–107)
CO2 SERPL-SCNC: 20.8 MMOL/L (ref 22–29)
CREAT SERPL-MCNC: 0.64 MG/DL (ref 0.57–1)
GFR SERPL CREATININE-BSD FRML MDRD: 108 ML/MIN/1.73
GLUCOSE SERPL-MCNC: 136 MG/DL (ref 65–99)
HCG SERPL QL: NEGATIVE
POTASSIUM SERPL-SCNC: 3.8 MMOL/L (ref 3.5–5.2)
RH BLD: POSITIVE
SARS-COV-2 ORF1AB RESP QL NAA+PROBE: NOT DETECTED
SODIUM SERPL-SCNC: 140 MMOL/L (ref 136–145)
T&S EXPIRATION DATE: NORMAL

## 2021-04-27 PROCEDURE — 36415 COLL VENOUS BLD VENIPUNCTURE: CPT

## 2021-04-27 PROCEDURE — 86900 BLOOD TYPING SEROLOGIC ABO: CPT

## 2021-04-27 PROCEDURE — 84703 CHORIONIC GONADOTROPIN ASSAY: CPT

## 2021-04-27 PROCEDURE — 86901 BLOOD TYPING SEROLOGIC RH(D): CPT

## 2021-04-27 PROCEDURE — C9803 HOPD COVID-19 SPEC COLLECT: HCPCS | Performed by: NURSE PRACTITIONER

## 2021-04-27 PROCEDURE — 85025 COMPLETE CBC W/AUTO DIFF WBC: CPT | Performed by: OBSTETRICS & GYNECOLOGY

## 2021-04-27 PROCEDURE — 86850 RBC ANTIBODY SCREEN: CPT

## 2021-04-27 PROCEDURE — 80048 BASIC METABOLIC PNL TOTAL CA: CPT

## 2021-04-27 PROCEDURE — U0004 COV-19 TEST NON-CDC HGH THRU: HCPCS | Performed by: NURSE PRACTITIONER

## 2021-04-27 RX ORDER — ACETAMINOPHEN 500 MG
500-1000 TABLET ORAL EVERY 6 HOURS PRN
COMMUNITY

## 2021-04-28 ENCOUNTER — ANESTHESIA EVENT (OUTPATIENT)
Dept: PERIOP | Facility: HOSPITAL | Age: 31
End: 2021-04-28

## 2021-04-29 ENCOUNTER — HOSPITAL ENCOUNTER (OUTPATIENT)
Facility: HOSPITAL | Age: 31
Setting detail: HOSPITAL OUTPATIENT SURGERY
Discharge: HOME | End: 2021-04-29
Attending: OBSTETRICS & GYNECOLOGY | Admitting: OBSTETRICS & GYNECOLOGY

## 2021-04-29 ENCOUNTER — ANESTHESIA (OUTPATIENT)
Dept: PERIOP | Facility: HOSPITAL | Age: 31
End: 2021-04-29

## 2021-04-29 ENCOUNTER — READMISSION MANAGEMENT (OUTPATIENT)
Dept: CALL CENTER | Facility: HOSPITAL | Age: 31
End: 2021-04-29

## 2021-04-29 VITALS
OXYGEN SATURATION: 98 % | HEART RATE: 70 BPM | RESPIRATION RATE: 16 BRPM | DIASTOLIC BLOOD PRESSURE: 76 MMHG | SYSTOLIC BLOOD PRESSURE: 115 MMHG | TEMPERATURE: 97 F

## 2021-04-29 DIAGNOSIS — R10.2 PELVIC PAIN IN FEMALE: ICD-10-CM

## 2021-04-29 PROCEDURE — 25010000002 HYDROMORPHONE PER 4 MG: Performed by: NURSE ANESTHETIST, CERTIFIED REGISTERED

## 2021-04-29 PROCEDURE — 25010000002 ONDANSETRON PER 1 MG: Performed by: NURSE ANESTHETIST, CERTIFIED REGISTERED

## 2021-04-29 PROCEDURE — 58661 LAPAROSCOPY REMOVE ADNEXA: CPT | Performed by: OBSTETRICS & GYNECOLOGY

## 2021-04-29 PROCEDURE — 25010000002 FENTANYL CITRATE (PF) 100 MCG/2ML SOLUTION: Performed by: NURSE ANESTHETIST, CERTIFIED REGISTERED

## 2021-04-29 PROCEDURE — 25010000002 PROPOFOL 10 MG/ML EMULSION: Performed by: NURSE ANESTHETIST, CERTIFIED REGISTERED

## 2021-04-29 PROCEDURE — 88305 TISSUE EXAM BY PATHOLOGIST: CPT | Performed by: OBSTETRICS & GYNECOLOGY

## 2021-04-29 PROCEDURE — S0260 H&P FOR SURGERY: HCPCS | Performed by: OBSTETRICS & GYNECOLOGY

## 2021-04-29 PROCEDURE — 25010000002 DEXAMETHASONE PER 1 MG: Performed by: NURSE ANESTHETIST, CERTIFIED REGISTERED

## 2021-04-29 PROCEDURE — 25010000002 KETOROLAC TROMETHAMINE PER 15 MG: Performed by: NURSE ANESTHETIST, CERTIFIED REGISTERED

## 2021-04-29 PROCEDURE — 25010000002 NEOSTIGMINE 5 MG/10ML SOLUTION: Performed by: NURSE ANESTHETIST, CERTIFIED REGISTERED

## 2021-04-29 RX ORDER — ACETAMINOPHEN 325 MG/1
650 TABLET ORAL ONCE AS NEEDED
Status: DISCONTINUED | OUTPATIENT
Start: 2021-04-29 | End: 2021-04-29 | Stop reason: HOSPADM

## 2021-04-29 RX ORDER — DROPERIDOL 2.5 MG/ML
1.25 INJECTION, SOLUTION INTRAMUSCULAR; INTRAVENOUS ONCE AS NEEDED
Status: DISCONTINUED | OUTPATIENT
Start: 2021-04-29 | End: 2021-04-29 | Stop reason: HOSPADM

## 2021-04-29 RX ORDER — FENTANYL CITRATE 50 UG/ML
50 INJECTION, SOLUTION INTRAMUSCULAR; INTRAVENOUS
Status: DISCONTINUED | OUTPATIENT
Start: 2021-04-29 | End: 2021-04-29 | Stop reason: HOSPADM

## 2021-04-29 RX ORDER — ACETAMINOPHEN 500 MG
500 TABLET ORAL ONCE
Status: COMPLETED | OUTPATIENT
Start: 2021-04-29 | End: 2021-04-29

## 2021-04-29 RX ORDER — EPHEDRINE SULFATE 50 MG/ML
5 INJECTION, SOLUTION INTRAVENOUS ONCE AS NEEDED
Status: DISCONTINUED | OUTPATIENT
Start: 2021-04-29 | End: 2021-04-29 | Stop reason: HOSPADM

## 2021-04-29 RX ORDER — GLYCOPYRROLATE 0.2 MG/ML
INJECTION INTRAMUSCULAR; INTRAVENOUS AS NEEDED
Status: DISCONTINUED | OUTPATIENT
Start: 2021-04-29 | End: 2021-04-29 | Stop reason: SURG

## 2021-04-29 RX ORDER — NALOXONE HCL 0.4 MG/ML
0.2 VIAL (ML) INJECTION AS NEEDED
Status: DISCONTINUED | OUTPATIENT
Start: 2021-04-29 | End: 2021-04-29 | Stop reason: HOSPADM

## 2021-04-29 RX ORDER — PROMETHAZINE HYDROCHLORIDE 25 MG/1
25 TABLET ORAL ONCE AS NEEDED
Status: DISCONTINUED | OUTPATIENT
Start: 2021-04-29 | End: 2021-04-29 | Stop reason: HOSPADM

## 2021-04-29 RX ORDER — SODIUM CHLORIDE 0.9 % (FLUSH) 0.9 %
3-10 SYRINGE (ML) INJECTION AS NEEDED
Status: DISCONTINUED | OUTPATIENT
Start: 2021-04-29 | End: 2021-04-29 | Stop reason: HOSPADM

## 2021-04-29 RX ORDER — MIDAZOLAM HYDROCHLORIDE 1 MG/ML
2 INJECTION INTRAMUSCULAR; INTRAVENOUS
Status: DISCONTINUED | OUTPATIENT
Start: 2021-04-29 | End: 2021-04-29 | Stop reason: HOSPADM

## 2021-04-29 RX ORDER — DIPHENHYDRAMINE HYDROCHLORIDE 50 MG/ML
12.5 INJECTION INTRAMUSCULAR; INTRAVENOUS
Status: DISCONTINUED | OUTPATIENT
Start: 2021-04-29 | End: 2021-04-29 | Stop reason: HOSPADM

## 2021-04-29 RX ORDER — ONDANSETRON 2 MG/ML
INJECTION INTRAMUSCULAR; INTRAVENOUS AS NEEDED
Status: DISCONTINUED | OUTPATIENT
Start: 2021-04-29 | End: 2021-04-29 | Stop reason: SURG

## 2021-04-29 RX ORDER — ACETAMINOPHEN 650 MG/1
650 SUPPOSITORY RECTAL ONCE AS NEEDED
Status: DISCONTINUED | OUTPATIENT
Start: 2021-04-29 | End: 2021-04-29 | Stop reason: HOSPADM

## 2021-04-29 RX ORDER — SODIUM CHLORIDE 0.9 % (FLUSH) 0.9 %
3 SYRINGE (ML) INJECTION EVERY 12 HOURS SCHEDULED
Status: DISCONTINUED | OUTPATIENT
Start: 2021-04-29 | End: 2021-04-29 | Stop reason: HOSPADM

## 2021-04-29 RX ORDER — ONDANSETRON 2 MG/ML
4 INJECTION INTRAMUSCULAR; INTRAVENOUS ONCE AS NEEDED
Status: COMPLETED | OUTPATIENT
Start: 2021-04-29 | End: 2021-04-29

## 2021-04-29 RX ORDER — DROPERIDOL 2.5 MG/ML
0.62 INJECTION, SOLUTION INTRAMUSCULAR; INTRAVENOUS ONCE AS NEEDED
Status: DISCONTINUED | OUTPATIENT
Start: 2021-04-29 | End: 2021-04-29 | Stop reason: HOSPADM

## 2021-04-29 RX ORDER — LABETALOL HYDROCHLORIDE 5 MG/ML
5 INJECTION, SOLUTION INTRAVENOUS
Status: DISCONTINUED | OUTPATIENT
Start: 2021-04-29 | End: 2021-04-29 | Stop reason: HOSPADM

## 2021-04-29 RX ORDER — FENTANYL CITRATE 50 UG/ML
INJECTION, SOLUTION INTRAMUSCULAR; INTRAVENOUS AS NEEDED
Status: DISCONTINUED | OUTPATIENT
Start: 2021-04-29 | End: 2021-04-29 | Stop reason: SURG

## 2021-04-29 RX ORDER — HYDROMORPHONE HYDROCHLORIDE 1 MG/ML
0.5 INJECTION, SOLUTION INTRAMUSCULAR; INTRAVENOUS; SUBCUTANEOUS
Status: DISCONTINUED | OUTPATIENT
Start: 2021-04-29 | End: 2021-04-29 | Stop reason: HOSPADM

## 2021-04-29 RX ORDER — KETOROLAC TROMETHAMINE 30 MG/ML
INJECTION, SOLUTION INTRAMUSCULAR; INTRAVENOUS AS NEEDED
Status: DISCONTINUED | OUTPATIENT
Start: 2021-04-29 | End: 2021-04-29 | Stop reason: SURG

## 2021-04-29 RX ORDER — LIDOCAINE HYDROCHLORIDE 10 MG/ML
0.5 INJECTION, SOLUTION EPIDURAL; INFILTRATION; INTRACAUDAL; PERINEURAL ONCE AS NEEDED
Status: COMPLETED | OUTPATIENT
Start: 2021-04-29 | End: 2021-04-29

## 2021-04-29 RX ORDER — HYDROCODONE BITARTRATE AND ACETAMINOPHEN 7.5; 325 MG/1; MG/1
1 TABLET ORAL ONCE AS NEEDED
Status: COMPLETED | OUTPATIENT
Start: 2021-04-29 | End: 2021-04-29

## 2021-04-29 RX ORDER — HYDROMORPHONE HCL 110MG/55ML
PATIENT CONTROLLED ANALGESIA SYRINGE INTRAVENOUS AS NEEDED
Status: DISCONTINUED | OUTPATIENT
Start: 2021-04-29 | End: 2021-04-29 | Stop reason: SURG

## 2021-04-29 RX ORDER — PROPOFOL 10 MG/ML
VIAL (ML) INTRAVENOUS AS NEEDED
Status: DISCONTINUED | OUTPATIENT
Start: 2021-04-29 | End: 2021-04-29 | Stop reason: SURG

## 2021-04-29 RX ORDER — PROMETHAZINE HYDROCHLORIDE 25 MG/1
25 SUPPOSITORY RECTAL ONCE AS NEEDED
Status: DISCONTINUED | OUTPATIENT
Start: 2021-04-29 | End: 2021-04-29 | Stop reason: HOSPADM

## 2021-04-29 RX ORDER — LIDOCAINE HYDROCHLORIDE 20 MG/ML
INJECTION, SOLUTION INFILTRATION; PERINEURAL AS NEEDED
Status: DISCONTINUED | OUTPATIENT
Start: 2021-04-29 | End: 2021-04-29 | Stop reason: SURG

## 2021-04-29 RX ORDER — DEXAMETHASONE SODIUM PHOSPHATE 10 MG/ML
INJECTION INTRAMUSCULAR; INTRAVENOUS AS NEEDED
Status: DISCONTINUED | OUTPATIENT
Start: 2021-04-29 | End: 2021-04-29 | Stop reason: SURG

## 2021-04-29 RX ORDER — DIPHENHYDRAMINE HCL 25 MG
25 CAPSULE ORAL
Status: DISCONTINUED | OUTPATIENT
Start: 2021-04-29 | End: 2021-04-29 | Stop reason: HOSPADM

## 2021-04-29 RX ORDER — NEOSTIGMINE METHYLSULFATE 0.5 MG/ML
INJECTION, SOLUTION INTRAVENOUS AS NEEDED
Status: DISCONTINUED | OUTPATIENT
Start: 2021-04-29 | End: 2021-04-29 | Stop reason: SURG

## 2021-04-29 RX ORDER — BUPIVACAINE HYDROCHLORIDE 2.5 MG/ML
INJECTION, SOLUTION INFILTRATION; PERINEURAL AS NEEDED
Status: DISCONTINUED | OUTPATIENT
Start: 2021-04-29 | End: 2021-04-29 | Stop reason: HOSPADM

## 2021-04-29 RX ORDER — MIDAZOLAM HYDROCHLORIDE 1 MG/ML
1 INJECTION INTRAMUSCULAR; INTRAVENOUS
Status: DISCONTINUED | OUTPATIENT
Start: 2021-04-29 | End: 2021-04-29 | Stop reason: HOSPADM

## 2021-04-29 RX ORDER — ROCURONIUM BROMIDE 10 MG/ML
INJECTION, SOLUTION INTRAVENOUS AS NEEDED
Status: DISCONTINUED | OUTPATIENT
Start: 2021-04-29 | End: 2021-04-29 | Stop reason: SURG

## 2021-04-29 RX ORDER — SODIUM CHLORIDE 9 MG/ML
INJECTION, SOLUTION INTRAVENOUS AS NEEDED
Status: DISCONTINUED | OUTPATIENT
Start: 2021-04-29 | End: 2021-04-29 | Stop reason: HOSPADM

## 2021-04-29 RX ORDER — ALBUTEROL SULFATE 2.5 MG/3ML
2.5 SOLUTION RESPIRATORY (INHALATION) ONCE AS NEEDED
Status: DISCONTINUED | OUTPATIENT
Start: 2021-04-29 | End: 2021-04-29 | Stop reason: HOSPADM

## 2021-04-29 RX ORDER — FLUMAZENIL 0.1 MG/ML
0.2 INJECTION INTRAVENOUS AS NEEDED
Status: DISCONTINUED | OUTPATIENT
Start: 2021-04-29 | End: 2021-04-29 | Stop reason: HOSPADM

## 2021-04-29 RX ORDER — HYDRALAZINE HYDROCHLORIDE 20 MG/ML
5 INJECTION INTRAMUSCULAR; INTRAVENOUS
Status: DISCONTINUED | OUTPATIENT
Start: 2021-04-29 | End: 2021-04-29 | Stop reason: HOSPADM

## 2021-04-29 RX ORDER — HYDROCODONE BITARTRATE AND ACETAMINOPHEN 5; 325 MG/1; MG/1
1 TABLET ORAL EVERY 4 HOURS PRN
Qty: 20 TABLET | Refills: 0 | Status: SHIPPED | OUTPATIENT
Start: 2021-04-29 | End: 2021-05-03 | Stop reason: SDUPTHER

## 2021-04-29 RX ORDER — SODIUM CHLORIDE, SODIUM LACTATE, POTASSIUM CHLORIDE, CALCIUM CHLORIDE 600; 310; 30; 20 MG/100ML; MG/100ML; MG/100ML; MG/100ML
9 INJECTION, SOLUTION INTRAVENOUS CONTINUOUS
Status: DISCONTINUED | OUTPATIENT
Start: 2021-04-29 | End: 2021-04-29 | Stop reason: HOSPADM

## 2021-04-29 RX ADMIN — PROPOFOL 250 MG: 10 INJECTION, EMULSION INTRAVENOUS at 08:22

## 2021-04-29 RX ADMIN — NEOSTIGMINE METHYLSULFATE 3 MG: 0.5 INJECTION INTRAVENOUS at 09:16

## 2021-04-29 RX ADMIN — FENTANYL CITRATE 100 MCG: 50 INJECTION INTRAMUSCULAR; INTRAVENOUS at 08:22

## 2021-04-29 RX ADMIN — HYDROMORPHONE HYDROCHLORIDE 0.5 MG: 2 INJECTION, SOLUTION INTRAMUSCULAR; INTRAVENOUS; SUBCUTANEOUS at 09:30

## 2021-04-29 RX ADMIN — DEXAMETHASONE SODIUM PHOSPHATE 8 MG: 10 INJECTION INTRAMUSCULAR; INTRAVENOUS at 08:35

## 2021-04-29 RX ADMIN — ONDANSETRON 4 MG: 2 INJECTION INTRAMUSCULAR; INTRAVENOUS at 09:40

## 2021-04-29 RX ADMIN — KETOROLAC TROMETHAMINE 30 MG: 30 INJECTION, SOLUTION INTRAMUSCULAR at 09:30

## 2021-04-29 RX ADMIN — SODIUM CHLORIDE, POTASSIUM CHLORIDE, SODIUM LACTATE AND CALCIUM CHLORIDE 9 ML/HR: 600; 310; 30; 20 INJECTION, SOLUTION INTRAVENOUS at 07:27

## 2021-04-29 RX ADMIN — ACETAMINOPHEN 500 MG: 500 TABLET, FILM COATED ORAL at 07:36

## 2021-04-29 RX ADMIN — FENTANYL CITRATE 50 MCG: 50 INJECTION, SOLUTION INTRAMUSCULAR; INTRAVENOUS at 09:40

## 2021-04-29 RX ADMIN — LIDOCAINE HYDROCHLORIDE 0.5 ML: 10 INJECTION, SOLUTION EPIDURAL; INFILTRATION; INTRACAUDAL; PERINEURAL at 07:27

## 2021-04-29 RX ADMIN — ONDANSETRON 4 MG: 2 INJECTION INTRAMUSCULAR; INTRAVENOUS at 08:35

## 2021-04-29 RX ADMIN — HYDROCODONE BITARTRATE AND ACETAMINOPHEN 1 TABLET: 7.5; 325 TABLET ORAL at 09:40

## 2021-04-29 RX ADMIN — FENTANYL CITRATE 50 MCG: 50 INJECTION, SOLUTION INTRAMUSCULAR; INTRAVENOUS at 10:00

## 2021-04-29 RX ADMIN — LIDOCAINE HYDROCHLORIDE 60 MG: 20 INJECTION, SOLUTION INFILTRATION; PERINEURAL at 08:22

## 2021-04-29 RX ADMIN — GLYCOPYRROLATE 0.4 MG: 0.2 INJECTION INTRAMUSCULAR; INTRAVENOUS at 09:16

## 2021-04-29 RX ADMIN — ROCURONIUM BROMIDE 25 MG: 50 INJECTION INTRAVENOUS at 08:22

## 2021-04-29 RX ADMIN — HYDROMORPHONE HYDROCHLORIDE 0.5 MG: 2 INJECTION, SOLUTION INTRAMUSCULAR; INTRAVENOUS; SUBCUTANEOUS at 09:24

## 2021-04-29 NOTE — ANESTHESIA PROCEDURE NOTES
Airway  Urgency: elective    Date/Time: 4/29/2021 8:26 AM  Airway not difficult    General Information and Staff    Patient location during procedure: OR  Anesthesiologist: Viktor Richard MD  CRNA: Zenia Ramos CRNA    Indications and Patient Condition  Indications for airway management: airway protection    Preoxygenated: yes (pt pre-O2 with 100% O2)  Mask difficulty assessment: 2 - vent by mask + OA or adjuvant +/- NMBA (easy BMV )    Final Airway Details  Final airway type: endotracheal airway      Successful airway: ETT  Cuffed: yes   Successful intubation technique: direct laryngoscopy  Endotracheal tube insertion site: oral  Blade: Tima  Blade size: 4  ETT size (mm): 7.0  Cormack-Lehane Classification: grade I - full view of glottis  Placement verified by: chest auscultation and capnometry   Cuff volume (mL): 7  Measured from: lips  ETT/EBT  to lips (cm): 21  Number of attempts at approach: 1  Assessment: lips, teeth, and gum same as pre-op and atraumatic intubation    Additional Comments  ATOETx1. No change in dentition.

## 2021-04-29 NOTE — ANESTHESIA POSTPROCEDURE EVALUATION
Patient: Dina Lyons    Procedure Summary     Date: 04/29/21 Room / Location:  [facility]  [facility]    Anesthesia Start: 0817 Anesthesia Stop: 0935    Procedure: SALPINGO OOPHORECTOMY  LAPAROSCOPY, LYSIS OF ADHESIONS (Left Abdomen) Diagnosis:       Pelvic pain in female      (Pelvic pain in female [R10.2])    Surgeons: Don Proctor MD Provider: Viktor Richard MD    Anesthesia Type: general ASA Status: 2          Anesthesia Type: general    Vitals  Vitals Value Taken Time   /55 04/29/21 1015   Temp 36.1 °C (97 °F) 04/29/21 1020   Pulse 64 04/29/21 1021   Resp 18 04/29/21 1020   SpO2 98 % 04/29/21 1021   Vitals shown include unvalidated device data.        Post Anesthesia Care and Evaluation    Patient location during evaluation: bedside  Patient participation: complete - patient participated  Level of consciousness: awake and alert  Pain management: adequate  Airway patency: patent  Anesthetic complications: No anesthetic complications  PONV Status: controlled  Cardiovascular status: blood pressure returned to baseline and acceptable  Respiratory status: acceptable  Hydration status: acceptable

## 2021-04-29 NOTE — ANESTHESIA PREPROCEDURE EVALUATION
Anesthesia Evaluation     Patient summary reviewed and Nursing notes reviewed   no history of anesthetic complications:  NPO Solid Status: > 8 hours  NPO Liquid Status: > 2 hours           Airway   Mallampati: II  TM distance: >3 FB  Neck ROM: full  No difficulty expected  Dental - normal exam     Pulmonary     breath sounds clear to auscultation  Cardiovascular   Exercise tolerance: good (4-7 METS)    Rhythm: regular  Rate: normal        Neuro/Psych  (+) psychiatric history Anxiety and Depression,     GI/Hepatic/Renal/Endo    (+) obesity,       Musculoskeletal     Abdominal    Substance History      OB/GYN          Other                        Anesthesia Plan    ASA 2     general     intravenous induction     Anesthetic plan, all risks, benefits, and alternatives have been provided, discussed and informed consent has been obtained with: patient.

## 2021-04-29 NOTE — OUTREACH NOTE
Medical Week 4 Survey      Responses   [facility] patient discharged from?  [place]   Does the patient have one of the following disease processes/diagnoses(primary or secondary)?  Other   Week 4 attempt successful?  Yes   Call start time  1001   Call end time  1003   Is patient permission given to speak with other caregiver?  Yes   List who call center can speak with  [place] mother    Person spoke with today (if not patient) and relationship  [place] mother    Meds reviewed with patient/caregiver?  Yes   Is the patient taking all medications as directed (includes completed medication regime)?  Yes   Has the patient kept scheduled appointments due by today?  Yes [Surgery today ]   Is the patient still receiving Home Health Services?  N/A   Psychosocial issues?  No   What is the patient's perception of their health status since discharge?  Improving   Week 4 Call Completed?  Yes   Would the patient like one additional call?  Yes   Wrap up additional comments  Patient's mother states patient is just now coming out of surgery - her left ovary and fallopian tube were removed-states she is doing well and did fine in surgery           Dina Alvarez RN

## 2021-04-30 LAB
LAB AP CASE REPORT: NORMAL
PATH REPORT.FINAL DX SPEC: NORMAL
PATH REPORT.GROSS SPEC: NORMAL

## 2021-05-03 DIAGNOSIS — Z98.890 S/P LAPAROSCOPY: Primary | ICD-10-CM

## 2021-05-03 RX ORDER — HYDROCODONE BITARTRATE AND ACETAMINOPHEN 5; 325 MG/1; MG/1
1 TABLET ORAL EVERY 6 HOURS PRN
Qty: 8 TABLET | Refills: 0 | Status: SHIPPED | OUTPATIENT
Start: 2021-05-03 | End: 2022-03-31

## 2021-05-07 ENCOUNTER — READMISSION MANAGEMENT (OUTPATIENT)
Dept: CALL CENTER | Facility: HOSPITAL | Age: 31
End: 2021-05-07

## 2021-05-07 NOTE — OUTREACH NOTE
Medical Week 5 Survey      Responses   [facility] patient discharged from?  [place]   Does the patient have one of the following disease processes/diagnoses(primary or secondary)?  Other   Week 5 attempt successful?  No          Nelly Alberts RN

## 2021-05-14 ENCOUNTER — OFFICE VISIT (OUTPATIENT)
Dept: OBSTETRICS AND GYNECOLOGY | Facility: CLINIC | Age: 31
End: 2021-05-14

## 2021-05-14 VITALS
BODY MASS INDEX: 35.16 KG/M2 | SYSTOLIC BLOOD PRESSURE: 124 MMHG | HEIGHT: 67 IN | WEIGHT: 224 LBS | DIASTOLIC BLOOD PRESSURE: 78 MMHG

## 2021-05-14 DIAGNOSIS — Z09 POSTOPERATIVE FOLLOW-UP: Primary | ICD-10-CM

## 2021-05-14 PROCEDURE — 99024 POSTOP FOLLOW-UP VISIT: CPT | Performed by: OBSTETRICS & GYNECOLOGY

## 2021-05-14 NOTE — PROGRESS NOTES
"Opal Lyons is a 30 y.o. female who presents to the clinic 2 weeks status post left oophorectomy for pelvic pain. Eating a regular diet without difficulty. Bowel movements are normal. The patient is not having any pain.   She reports doing very well from the surgery and noted immediate relief in her pain following the surgery.    The following portions of the patient's history were reviewed and updated as appropriate: allergies, current medications, past family history, past medical history, past social history, past surgical history and problem list.    Review of Systems  Pertinent items are noted in HPI.      Objective     /78   Ht 170.2 cm (67.01\")   Wt 102 kg (224 lb)   LMP 05/09/2021   BMI 35.07 kg/m²   General:  alert, appears stated age and cooperative   Abdomen: soft, bowel sounds active, non-tender   Incision:   healing well, no drainage, no erythema, no hernia, no seroma, no swelling, no dehiscence, incision well approximated         Assessment      Doing well postoperatively.  Operative findings again reviewed. Pathology report discussed.      Plan     1. Continue any current medications.  2. Wound care discussed.  3. Activity restrictions: none  4. Anticipated return to work: now.  5. Follow up: 1 Year for Pap screen..   "

## 2022-03-31 ENCOUNTER — HOSPITAL ENCOUNTER (OUTPATIENT)
Dept: CT IMAGING | Facility: HOSPITAL | Age: 32
Discharge: HOME | End: 2022-03-31
Admitting: NURSE PRACTITIONER

## 2022-03-31 ENCOUNTER — TELEPHONE (OUTPATIENT)
Dept: FAMILY MEDICINE CLINIC | Age: 32
End: 2022-03-31

## 2022-03-31 ENCOUNTER — OFFICE VISIT (OUTPATIENT)
Dept: FAMILY MEDICINE CLINIC | Age: 32
End: 2022-03-31

## 2022-03-31 VITALS
SYSTOLIC BLOOD PRESSURE: 110 MMHG | DIASTOLIC BLOOD PRESSURE: 70 MMHG | BODY MASS INDEX: 35.36 KG/M2 | WEIGHT: 225.3 LBS | HEART RATE: 66 BPM | OXYGEN SATURATION: 96 % | HEIGHT: 67 IN | TEMPERATURE: 97.8 F

## 2022-03-31 DIAGNOSIS — R06.2 WHEEZING: ICD-10-CM

## 2022-03-31 DIAGNOSIS — R05.9 COUGH: ICD-10-CM

## 2022-03-31 DIAGNOSIS — R06.09 DYSPNEA ON EXERTION: ICD-10-CM

## 2022-03-31 DIAGNOSIS — Z00.00 ENCOUNTER FOR MEDICAL EXAMINATION TO ESTABLISH CARE: Primary | ICD-10-CM

## 2022-03-31 DIAGNOSIS — J40 BRONCHITIS: ICD-10-CM

## 2022-03-31 DIAGNOSIS — Z13.31 DEPRESSION SCREENING: ICD-10-CM

## 2022-03-31 DIAGNOSIS — Z00.00 ANNUAL PHYSICAL EXAM: ICD-10-CM

## 2022-03-31 PROCEDURE — 99204 OFFICE O/P NEW MOD 45 MIN: CPT | Performed by: NURSE PRACTITIONER

## 2022-03-31 PROCEDURE — 71250 CT THORAX DX C-: CPT

## 2022-03-31 RX ORDER — PREDNISONE 20 MG/1
20 TABLET ORAL 3 TIMES DAILY
COMMUNITY
End: 2022-04-08

## 2022-03-31 RX ORDER — DEXTROMETHORPHAN HYDROBROMIDE AND PROMETHAZINE HYDROCHLORIDE 15; 6.25 MG/5ML; MG/5ML
5 SOLUTION ORAL 4 TIMES DAILY PRN
Qty: 240 ML | Refills: 0 | Status: SHIPPED | OUTPATIENT
Start: 2022-03-31 | End: 2022-04-10

## 2022-03-31 RX ORDER — BENZONATATE 100 MG/1
100 CAPSULE ORAL 3 TIMES DAILY PRN
COMMUNITY
End: 2022-04-28

## 2022-03-31 RX ORDER — FLUTICASONE PROPIONATE 44 MCG
1 AEROSOL WITH ADAPTER (GRAM) INHALATION
Qty: 10.6 G | Refills: 11 | Status: SHIPPED | OUTPATIENT
Start: 2022-03-31 | End: 2022-04-08

## 2022-03-31 RX ORDER — ALBUTEROL SULFATE 90 UG/1
2 AEROSOL, METERED RESPIRATORY (INHALATION) EVERY 4 HOURS PRN
COMMUNITY

## 2022-03-31 RX ORDER — AZITHROMYCIN 250 MG/1
TABLET, FILM COATED ORAL
Qty: 6 TABLET | Refills: 0 | Status: SHIPPED | OUTPATIENT
Start: 2022-03-31 | End: 2022-04-08

## 2022-03-31 NOTE — PROGRESS NOTES
"Dina Lyons presents to [facility] with complaint of  Establish Care and Cough (Tested for pneumonia, covid and flu which were all negative. SOA, chest tightness onset last Thursday  3/24 )    SUBJECTIVE  History of Present Illness  The patient presents today to establish relations.  She is currently in the process of moving to [place].  She works at [employer].  She is in a long-term relationship with her boyfriend.    For the past week she has been having increased shortness of air, worsened with exertion.   She has been running a fever at night. She has a dry cough, occasionally productive of clear-yellow sputum.  She has also been wheezing and having \"coughing spells.\" She was seen at the hospital in [facility] and was negative for flu, Covid, chest x-ray was normal per patient.  She was given a prescription for Tessalon Perles, albuterol inhaler and steroid taper.  She had asthma as a child and pna in the past.     She had bloodwork at hospital in [facility] in [place], [place]. The patient says these results were normal.     The patient's past medical, surgical and family history were reviewed and updated as needed in the chart. Routine health screenings and immunizations were reviewed as well.   OBJECTIVE  Vital Signs:   /70 (BP Location: Right arm, Patient Position: Sitting)   Pulse 66   Temp 97.8 °F (36.6 °C) (Oral)   Ht 170.2 cm (67\")   Wt 102 kg (225 lb 4.8 oz)   SpO2 96% Comment: on room air  BMI 35.29 kg/m²       Physical Exam  Vitals reviewed.   Constitutional:       General: She is not in acute distress.     Appearance: Normal appearance. She is not ill-appearing.   HENT:      Head: Normocephalic and atraumatic.      Nose: Nose normal.      Right Sinus: No maxillary sinus tenderness or frontal sinus tenderness.      Left Sinus: No maxillary sinus tenderness or frontal sinus tenderness.      Mouth/Throat:      Mouth: Mucous membranes are moist. "      Pharynx: Oropharynx is clear.   Cardiovascular:      Rate and Rhythm: Normal rate and regular rhythm.      Pulses: Normal pulses.      Heart sounds: Normal heart sounds.   Pulmonary:      Effort: Pulmonary effort is normal. No respiratory distress.      Breath sounds: Normal breath sounds.   Chest:      Chest wall: Tenderness present.   Musculoskeletal:      Cervical back: Neck supple.   Lymphadenopathy:      Cervical: No cervical adenopathy.   Skin:     General: Skin is warm and dry.      Capillary Refill: Capillary refill takes less than 2 seconds.   Neurological:      General: No focal deficit present.      Mental Status: She is alert and oriented to person, place, and time. Mental status is at baseline.   Psychiatric:         Mood and Affect: Mood normal.         Behavior: Behavior normal.         Judgment: Judgment normal.          Results Review:  The following data was reviewed by JAMES Rodrigues [unfilled] 09:12 EDT.         Procedures     ASSESSMENT AND PLAN:  Diagnoses and all orders for this visit:    1. Encounter for medical examination to establish care (Primary)    2. Bronchitis  Assessment & Plan:  -Since the patient had a chest x-ray that was unrevealing, we will go ahead and do a CT chest, especially since she is having increased work of breathing whenever she exerts herself  -Cover bacterial causes with azithromycin  -I explained to the patient if the symptoms persist she may have recurrence of her asthma  -Further testing/PFTs will be considered if patient still experiences symptoms after current treatment plan completed     Orders:  -     azithromycin (Zithromax Z-Milton) 250 MG tablet; Take 2 tablets by mouth on day 1, then 1 tablet daily on days 2-5  Dispense: 6 tablet; Refill: 0  -     fluticasone (Flovent HFA) 44 MCG/ACT inhaler; Inhale 1 puff 2 (Two) Times a Day.  Dispense: 10.6 g; Refill: 11  -     promethazine-dextromethorphan (PROMETHAZINE-DM) 6.25-15 MG/5ML solution; Take 5 mL by mouth  4 (Four) Times a Day As Needed for Cough for up to 10 days.  Dispense: 240 mL; Refill: 0    3. Dyspnea on exertion  -     Cancel: CT Chest Without Contrast; Future  -     Cancel: CT Chest Without Contrast; Future  -     CT Chest Without Contrast; Future    4. Wheezing  -     Cancel: CT Chest Without Contrast; Future  -     Cancel: CT Chest Without Contrast; Future  -     CT Chest Without Contrast; Future    5. Cough  -     CT Chest Without Contrast; Future    6. Depression screening  Comments:  -negative           Follow Up   Return if symptoms worsen or fail to improve. Patient to notify office with any acute concerns or issues.  Patient verbalizes understanding, agrees with plan of care and has no further questions upon discharge. The patient was instructed to dial 911/seek ER care if she develops worsened SOA, CP, uncontrolled fever N/VD or any other untoward symptoms.     Patient was given instructions and counseling regarding her condition or for health maintenance advice. Please see specific information pulled into the AVS if appropriate.

## 2022-03-31 NOTE — TELEPHONE ENCOUNTER
Caller: Dina Lyons    Relationship to patient: Self    Best call back number: [phone] OKAY TO LEAVE MESSAGE ON PHONE    Patient is needing: PATIENT CALLED IN AND WAS CHECKING ON THE RESULTS OF HER CT SCAN. PATIENT REQUESTS A CALL BACK PLEASE.

## 2022-03-31 NOTE — ASSESSMENT & PLAN NOTE
-Since the patient had a chest x-ray that was unrevealing, we will go ahead and do a CT chest, especially since she is having increased work of breathing whenever she exerts herself  -Cover bacterial causes with azithromycin  -I explained to the patient if the symptoms persist she may have recurrence of her asthma  -Further testing/PFTs will be considered if patient still experiences symptoms after current treatment plan completed

## 2022-04-01 ENCOUNTER — TELEPHONE (OUTPATIENT)
Dept: FAMILY MEDICINE CLINIC | Age: 32
End: 2022-04-01

## 2022-04-01 NOTE — TELEPHONE ENCOUNTER
Caller: Dina Lyons    Relationship: Self    Best call back number: [phone]    What was the call regarding: PATIENT CALLING FOR HER CT RESULTS    Do you require a callback: YES PLEASE CALL BACK ADVISE

## 2022-04-04 ENCOUNTER — TELEPHONE (OUTPATIENT)
Dept: FAMILY MEDICINE CLINIC | Age: 32
End: 2022-04-04

## 2022-04-04 NOTE — TELEPHONE ENCOUNTER
Caller: Dina Lyons    Relationship: Self    Best call back number: [phone]    Who are you requesting to speak with (clinical staff, provider,  specific staff member):  STAFF    What was the call regarding: PATIENT WOULD LIKE TO KNOW IF SHE CAN GET A WORK NOTE FOR 3/31 THROUGH THIS WEEK. SHE WAS SEEN 3/31 FOR SYMPTOMS. SHE HAS ANOTHER APPOINTMENT THIS Friday. SHE EXPLAINED THAT HER JOB IS GIVING HER A HARD TIME WITH BEING SICK.

## 2022-04-07 NOTE — PROGRESS NOTES
Dina Lyons presents to [facility] with complaint of  Asthma (Follow up. )    SUBJECTIVE  History of Present Illness     She is following up today for her shortness of air.  At her last visit on March 31 she was given a Z-Milton, fluticasone, and Promethazine DM.  She says she was unable to  the fluticasone inhaler as it was $75.     Today the patient says she feels much better, she has not been running a fever, her cough is essentially gone, and she is not having any shortness of breath.  She denies any wheezing.  She has been using albuterol inhaler, just daily.     Weight loss- noted that her weights in her chart shows that she has lost 20 pounds since her last visit.  She said at her visit on March 31 her weight was actually 210 pounds.  This would still mean she has lost 6 pounds in a week, the patient does say that she was not able to eat for several days.  She is now eating back to normal.      OBJECTIVE  Vital Signs:   /86 (BP Location: Left arm, Patient Position: Sitting)   Pulse 78   Temp 98.4 °F (36.9 °C) (Oral)   Wt 92.9 kg (204 lb 12.8 oz)   SpO2 96% Comment: room air  BMI 32.08 kg/m²       Physical Exam  Vitals reviewed.   Constitutional:       General: She is not in acute distress.     Appearance: Normal appearance. She is not ill-appearing.   HENT:      Head: Normocephalic and atraumatic.      Nose: Nose normal.      Mouth/Throat:      Mouth: Mucous membranes are moist.      Pharynx: Oropharynx is clear.   Cardiovascular:      Rate and Rhythm: Normal rate and regular rhythm.      Pulses: Normal pulses.      Heart sounds: Normal heart sounds.   Pulmonary:      Effort: Pulmonary effort is normal.      Breath sounds: Examination of the right-upper field reveals wheezing. Examination of the left-upper field reveals wheezing. Wheezing present.   Musculoskeletal:      Cervical back: Neck supple.   Skin:     General: Skin is warm and dry.      Capillary  Refill: Capillary refill takes less than 2 seconds.   Neurological:      General: No focal deficit present.      Mental Status: She is alert and oriented to person, place, and time. Mental status is at baseline.   Psychiatric:         Mood and Affect: Mood normal.         Behavior: Behavior normal.         Judgment: Judgment normal.          Results Review:  The following data was reviewed by Liset Akbar, JAMES [unfilled] 15:57 EDT.         Narrative & Impression   PROCEDURE:  CT CHEST WO CONTRAST DIAGNOSTIC     COMPARISON: None     INDICATIONS:  SOA with exertion     TECHNIQUE:    CT images were created without the administration of contrast material.       PROTOCOL:     Standard imaging protocol performed                 RADIATION:      DLP: 487.1 mGy*cm               Automated exposure control was utilized to minimize radiation dose.      FINDINGS:          LUNGS:             There are subtle areas of increased attenuation throughout the upper and lower lung zones.    This is a mosaic attenuation type pattern although subtle may indicate changes of small airways   disease.  There is a scar in the medial left lower lobe.  There are no focal consolidations.  VASCULATURE:            Normal.  Thrombus cannot be excluded without intravenous contrast.    SHANELL:    Normal.  No mass or adenopathy.    MEDIASTINUM:             Normal.  No mass or adenopathy.    CARDIAC:         Normal.  No enlargement, pericardial thickening, or significant calcification.  PLEURA:           Normal.  No mass or effusion.    AORTA:             Normal.  No aneurysm.    CHEST WALL:  Normal.  No mass or axillary adenopathy.    LIMITED ABDOMEN:     Normal.  Limited images of the upper abdomen are unremarkable.    BONES:             Normal.  No bony lesion or fracture.       IMPRESSION:               Subtle changes of mosaic attenuation can be seen with small airways disease.    High-resolution CT chest may prove more useful for evaluation.         ER  report from [facility] reviewed    Procedures     ASSESSMENT AND PLAN:  Diagnoses and all orders for this visit:    1. Bronchitis (Primary)  Assessment & Plan:  -resolved with steroid taper and zpack      2. Mild intermittent asthma without complication  Assessment & Plan:  -Although the patient symptoms are much improved she is still wheezing on physical exam  -I have high suspicion that this is related to asthma especially since she had asthma as a child, and giver her CT results  -I would like for her to be on inhaled steroid for now but she is unable to afford fluticasone so we will find cheaper alternative to send in   -Continue albuterol inhaler as needed    Orders:  -     budesonide-formoterol (Symbicort) 160-4.5 MCG/ACT inhaler; Inhale 2 puffs 2 (Two) Times a Day.  Dispense: 6 g; Refill: 11    3. Tobacco abuse, in remission  Assessment & Plan:  -The patient has been smoke-free for 1 month  -She was congratulated on her success and encouraged to keep up the good work with smoking cessation            Follow Up   Return in about 1 year (around 4/8/2023). Patient to notify office with any acute concerns or issues, or if issues with wheezing shortness of air and cough return.  Patient verbalizes understanding, agrees with plan of care and has no further questions upon discharge.  Return to work April 9, 2022.     Patient was given instructions and counseling regarding her condition or for health maintenance advice. Please see specific information pulled into the AVS if appropriate.

## 2022-04-08 ENCOUNTER — OFFICE VISIT (OUTPATIENT)
Dept: FAMILY MEDICINE CLINIC | Age: 32
End: 2022-04-08

## 2022-04-08 VITALS
OXYGEN SATURATION: 96 % | SYSTOLIC BLOOD PRESSURE: 125 MMHG | HEART RATE: 78 BPM | BODY MASS INDEX: 32.08 KG/M2 | WEIGHT: 204.8 LBS | DIASTOLIC BLOOD PRESSURE: 86 MMHG | TEMPERATURE: 98.4 F

## 2022-04-08 DIAGNOSIS — J40 BRONCHITIS: Primary | ICD-10-CM

## 2022-04-08 DIAGNOSIS — J45.20 MILD INTERMITTENT ASTHMA WITHOUT COMPLICATION: ICD-10-CM

## 2022-04-08 DIAGNOSIS — F17.201 TOBACCO ABUSE, IN REMISSION: ICD-10-CM

## 2022-04-08 PROCEDURE — 99214 OFFICE O/P EST MOD 30 MIN: CPT | Performed by: NURSE PRACTITIONER

## 2022-04-08 RX ORDER — BUDESONIDE AND FORMOTEROL FUMARATE DIHYDRATE 160; 4.5 UG/1; UG/1
2 AEROSOL RESPIRATORY (INHALATION)
Qty: 6 G | Refills: 11 | Status: SHIPPED | OUTPATIENT
Start: 2022-04-08 | End: 2022-04-28

## 2022-04-08 NOTE — ASSESSMENT & PLAN NOTE
-The patient has been smoke-free for 1 month  -She was congratulated on her success and encouraged to keep up the good work with smoking cessation

## 2022-04-28 ENCOUNTER — OFFICE VISIT (OUTPATIENT)
Dept: FAMILY MEDICINE CLINIC | Age: 32
End: 2022-04-28

## 2022-04-28 ENCOUNTER — LAB (OUTPATIENT)
Dept: LAB | Facility: HOSPITAL | Age: 32
End: 2022-04-28

## 2022-04-28 VITALS
BODY MASS INDEX: 32.95 KG/M2 | WEIGHT: 210.4 LBS | TEMPERATURE: 98.5 F | SYSTOLIC BLOOD PRESSURE: 124 MMHG | DIASTOLIC BLOOD PRESSURE: 74 MMHG | HEART RATE: 66 BPM

## 2022-04-28 DIAGNOSIS — J45.20 MILD INTERMITTENT ASTHMA WITHOUT COMPLICATION: ICD-10-CM

## 2022-04-28 DIAGNOSIS — R10.13 EPIGASTRIC PAIN: ICD-10-CM

## 2022-04-28 DIAGNOSIS — R11.0 NAUSEA: ICD-10-CM

## 2022-04-28 DIAGNOSIS — K21.9 GASTROESOPHAGEAL REFLUX DISEASE, UNSPECIFIED WHETHER ESOPHAGITIS PRESENT: ICD-10-CM

## 2022-04-28 DIAGNOSIS — F17.201 TOBACCO ABUSE, IN REMISSION: Primary | ICD-10-CM

## 2022-04-28 LAB — UREA BREATH TEST QL: NEGATIVE

## 2022-04-28 PROCEDURE — 99214 OFFICE O/P EST MOD 30 MIN: CPT | Performed by: NURSE PRACTITIONER

## 2022-04-28 PROCEDURE — 83013 H PYLORI (C-13) BREATH: CPT

## 2022-04-28 NOTE — ASSESSMENT & PLAN NOTE
-her symptoms are concerning for possible GERD or stomach ulcer   -rule out H. Pylori first  -will start on PPI to see if this helps her symptoms as long as H. Pylori negative   -may need GI referral in future for scope

## 2022-04-28 NOTE — ASSESSMENT & PLAN NOTE
-the patient has only been using her albuterol as needed, not on daily basis  -she says her breathing issues have resolved, she never started on Symbicort

## 2022-04-28 NOTE — PROGRESS NOTES
Dina Lyons presents to [facility] with complaint of  GI Problem (Pain after eating)    SUBJECTIVE  Abdominal Pain  This is a new problem. Episode onset: 3 weeks ago. The onset quality is gradual. The problem occurs constantly. The problem has been waxing and waning. The pain is located in the epigastric region. The pain is moderate. The quality of the pain is aching and burning (stabbing). The abdominal pain does not radiate. Associated symptoms include vomiting. The pain is aggravated by eating. The pain is relieved by nothing. She has tried nothing for the symptoms. Her past medical history is significant for GERD.       OBJECTIVE  Vital Signs:   /74 (BP Location: Left arm, Patient Position: Sitting)   Pulse 66   Temp 98.5 °F (36.9 °C) (Oral)   Wt 95.4 kg (210 lb 6.4 oz)   BMI 32.95 kg/m²       Physical Exam  Vitals reviewed.   Constitutional:       General: She is not in acute distress.     Appearance: Normal appearance. She is not ill-appearing.   HENT:      Head: Normocephalic and atraumatic.      Nose: Nose normal.      Mouth/Throat:      Mouth: Mucous membranes are moist.      Pharynx: Oropharynx is clear.   Cardiovascular:      Rate and Rhythm: Normal rate and regular rhythm.      Pulses: Normal pulses.      Heart sounds: Normal heart sounds.   Pulmonary:      Effort: Pulmonary effort is normal.      Breath sounds: Normal breath sounds.   Abdominal:      Tenderness: There is abdominal tenderness in the epigastric area. There is no guarding or rebound. Negative signs include Cary's sign and Rovsing's sign.      Hernia: No hernia is present.   Musculoskeletal:      Cervical back: Neck supple.   Skin:     General: Skin is warm and dry.      Capillary Refill: Capillary refill takes less than 2 seconds.   Neurological:      General: No focal deficit present.      Mental Status: She is alert and oriented to person, place, and time. Mental status is at baseline.    Psychiatric:         Mood and Affect: Mood normal.         Behavior: Behavior normal.         Judgment: Judgment normal.              ASSESSMENT AND PLAN:  Diagnoses and all orders for this visit:    1. Tobacco abuse, in remission (Primary)  Assessment & Plan:  -she has not smoked now for 2 months  -the pt was encouraged for her success for this       2. Mild intermittent asthma without complication  Assessment & Plan:  -the patient has only been using her albuterol as needed, not on daily basis  -she says her breathing issues have resolved, she never started on Symbicort           3. Epigastric pain  Assessment & Plan:  -her symptoms are concerning for possible GERD or stomach ulcer   -rule out H. Pylori first  -will start on PPI to see if this helps her symptoms as long as H. Pylori negative   -may need GI referral in future for scope     Orders:  -     H. Pylori Breath Test - Breath, Lung; Future          Follow Up   Return if symptoms worsen or fail to improve. Patient to notify office with any acute concerns or issues.  Patient verbalizes understanding, agrees with plan of care and has no further questions upon discharge.     Patient was given instructions and counseling regarding her condition or for health maintenance advice. Please see specific information pulled into the AVS if appropriate.

## 2022-04-29 ENCOUNTER — TELEPHONE (OUTPATIENT)
Dept: FAMILY MEDICINE CLINIC | Age: 32
End: 2022-04-29

## 2022-04-29 RX ORDER — OMEPRAZOLE 40 MG/1
40 CAPSULE, DELAYED RELEASE ORAL DAILY
Qty: 90 CAPSULE | Refills: 1 | Status: SHIPPED | OUTPATIENT
Start: 2022-04-29 | End: 2022-12-15

## 2022-04-29 RX ORDER — ONDANSETRON 4 MG/1
4 TABLET, ORALLY DISINTEGRATING ORAL EVERY 8 HOURS PRN
Qty: 30 TABLET | Refills: 0 | Status: SHIPPED | OUTPATIENT
Start: 2022-04-29 | End: 2022-06-23 | Stop reason: SDUPTHER

## 2022-06-23 ENCOUNTER — OFFICE VISIT (OUTPATIENT)
Dept: FAMILY MEDICINE CLINIC | Age: 32
End: 2022-06-23

## 2022-06-23 VITALS
BODY MASS INDEX: 33.37 KG/M2 | DIASTOLIC BLOOD PRESSURE: 90 MMHG | OXYGEN SATURATION: 99 % | WEIGHT: 212.6 LBS | HEART RATE: 72 BPM | HEIGHT: 67 IN | SYSTOLIC BLOOD PRESSURE: 152 MMHG

## 2022-06-23 DIAGNOSIS — R20.0 NUMBNESS AND TINGLING OF BOTH UPPER EXTREMITIES: ICD-10-CM

## 2022-06-23 DIAGNOSIS — R20.2 NUMBNESS AND TINGLING OF BOTH UPPER EXTREMITIES: ICD-10-CM

## 2022-06-23 DIAGNOSIS — R11.0 NAUSEA: ICD-10-CM

## 2022-06-23 DIAGNOSIS — G44.209 TENSION HEADACHE: ICD-10-CM

## 2022-06-23 DIAGNOSIS — R03.0 ELEVATED BLOOD PRESSURE READING IN OFFICE WITHOUT DIAGNOSIS OF HYPERTENSION: ICD-10-CM

## 2022-06-23 DIAGNOSIS — G43.819 OTHER MIGRAINE WITHOUT STATUS MIGRAINOSUS, INTRACTABLE: Primary | ICD-10-CM

## 2022-06-23 DIAGNOSIS — R73.09 ELEVATED RANDOM BLOOD GLUCOSE LEVEL: ICD-10-CM

## 2022-06-23 PROCEDURE — 96372 THER/PROPH/DIAG INJ SC/IM: CPT | Performed by: NURSE PRACTITIONER

## 2022-06-23 PROCEDURE — 99213 OFFICE O/P EST LOW 20 MIN: CPT | Performed by: NURSE PRACTITIONER

## 2022-06-23 RX ORDER — ONDANSETRON 4 MG/1
4 TABLET, ORALLY DISINTEGRATING ORAL EVERY 8 HOURS PRN
Qty: 30 TABLET | Refills: 0 | Status: SHIPPED | OUTPATIENT
Start: 2022-06-23 | End: 2022-12-15

## 2022-06-23 RX ORDER — KETOROLAC TROMETHAMINE 30 MG/ML
60 INJECTION, SOLUTION INTRAMUSCULAR; INTRAVENOUS ONCE
Status: COMPLETED | OUTPATIENT
Start: 2022-06-23 | End: 2022-06-23

## 2022-06-23 RX ORDER — RIZATRIPTAN BENZOATE 5 MG/1
5 TABLET ORAL ONCE AS NEEDED
Qty: 9 TABLET | Refills: 1 | Status: SHIPPED | OUTPATIENT
Start: 2022-06-23 | End: 2022-06-30 | Stop reason: SDUPTHER

## 2022-06-23 RX ORDER — METHOCARBAMOL 500 MG/1
500 TABLET, FILM COATED ORAL 4 TIMES DAILY
Qty: 40 TABLET | Refills: 1 | Status: SHIPPED | OUTPATIENT
Start: 2022-06-23 | End: 2022-12-15

## 2022-06-23 RX ADMIN — KETOROLAC TROMETHAMINE 60 MG: 30 INJECTION, SOLUTION INTRAMUSCULAR; INTRAVENOUS at 13:34

## 2022-06-23 NOTE — PROGRESS NOTES
"Chief Complaint  Migraine (tunne), tunnel vision, and Numbness (Pt states go back in fourth between left and right arm and also states some tingling)    Subjective    Patient is a 31-year-old female in today with complaints of worsening migraines.  Patient reports migraines originally started when she was 17 years old after having her first child and epidural.  She reports that headaches have been inconsistent up to this point, but she has now had a headache the same headache for 2-1/2 weeks.  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  Patient has been to the eye doctor, and reports her eyes site is intact.  She has some numbness and tingling that changes from her right side to her left side, with facial flushing and nausea.  Patient went to the emergency room last night, where they gave her a migraine cocktail, which helped but only for a short period of time.  Patient is taking ibuprofen, which diminishes pain, but does not get rid of the migraines.        Dina Lyons presents to [facility]  Migraine  Headache pattern:  Some headache always there, and the pain level varies  Duration:  2 to 4 weeks  Date current headache began:  6/6/2022  Age current headache began:  17  Frequency:  Headaches came infrequently then constant pain started  Recent event:  None  Providers seen:  Primary care provider and eye doctor  Quality:  Pounding  Location:  Around eyes and front/forehead  Pain severity:  6  Aggravating factors:  None  Changes in thinking and mood:  Mood changes  Changes in vision:  Loss of vision  Bilateral symptoms:  Forehead/facial flushing, numbness and tingling  Stomach/GI changes:  Nausea  Changes in sensation:  Lightheadedness      Objective   Vital Signs:  /90   Pulse 72   Ht 170.2 cm (67\")   Wt 96.4 kg (212 lb 9.6 oz)   SpO2 99%   BMI 33.30 kg/m²   Estimated body mass index is 33.3 kg/m² as calculated from the following:    " "Height as of this encounter: 170.2 cm (67\").    Weight as of this encounter: 96.4 kg (212 lb 9.6 oz).          Physical Exam  HENT:      Head: Normocephalic.   Cardiovascular:      Rate and Rhythm: Normal rate and regular rhythm.   Pulmonary:      Effort: Pulmonary effort is normal. No respiratory distress.      Breath sounds: Normal breath sounds. No stridor. No wheezing, rhonchi or rales.   Skin:     General: Skin is warm and dry.      Findings: Erythema present.          Neurological:      Mental Status: She is alert and oriented to person, place, and time.      Sensory: Sensory deficit present.      Motor: Weakness present.      Gait: Gait is intact.      Comments: Unable to fel touch to right upper pain, right arm weakness noted   Psychiatric:         Mood and Affect: Mood normal.        Result Review :                Assessment and Plan   Diagnoses and all orders for this visit:    1. Other migraine without status migrainosus, intractable (Primary)  -     ketorolac (TORADOL) injection 60 mg  -     rizatriptan (MAXALT) 5 MG tablet; Take 1 tablet by mouth 1 (One) Time As Needed for Migraine.  May repeat in 2 hours if needed.  Dispense: 9 tablet; Refill: 1  -     Ambulatory Referral to Neurology    2. Tension headache  -     methocarbamol (Robaxin) 500 MG tablet; Take 1 tablet by mouth 4 (Four) Times a Day.  Dispense: 40 tablet; Refill: 1    3. Numbness and tingling of both upper extremities  -     Ambulatory Referral to Neurology    4. Nausea  -     ondansetron ODT (Zofran ODT) 4 MG disintegrating tablet; Place 1 tablet on the tongue Every 8 (Eight) Hours As Needed for Nausea or Vomiting.  Dispense: 30 tablet; Refill: 0    5. Elevated blood pressure reading in office without diagnosis of hypertension  Comments:  Monitor blood pressure at home, follow up with PCP if elevation continues.     6. Elevated random blood glucose level  -     Hemoglobin A1c; Future             Follow Up   Return if symptoms worsen or " fail to improve.  Patient was given instructions and counseling regarding her condition or for health maintenance advice. Please see specific information pulled into the AVS if appropriate.

## 2022-06-30 ENCOUNTER — OFFICE VISIT (OUTPATIENT)
Dept: FAMILY MEDICINE CLINIC | Age: 32
End: 2022-06-30

## 2022-06-30 VITALS
DIASTOLIC BLOOD PRESSURE: 75 MMHG | SYSTOLIC BLOOD PRESSURE: 125 MMHG | TEMPERATURE: 98.5 F | HEART RATE: 69 BPM | HEIGHT: 67 IN | WEIGHT: 216 LBS | BODY MASS INDEX: 33.9 KG/M2

## 2022-06-30 DIAGNOSIS — G43.111 INTRACTABLE MIGRAINE WITH AURA WITH STATUS MIGRAINOSUS: Primary | ICD-10-CM

## 2022-06-30 PROCEDURE — 99214 OFFICE O/P EST MOD 30 MIN: CPT | Performed by: NURSE PRACTITIONER

## 2022-06-30 RX ORDER — RIZATRIPTAN BENZOATE 5 MG/1
5 TABLET ORAL ONCE AS NEEDED
Qty: 9 TABLET | Refills: 1 | Status: SHIPPED | OUTPATIENT
Start: 2022-06-30 | End: 2022-07-12 | Stop reason: DRUGHIGH

## 2022-06-30 RX ORDER — AMITRIPTYLINE HYDROCHLORIDE 10 MG/1
10 TABLET, FILM COATED ORAL NIGHTLY
Qty: 30 TABLET | Refills: 1 | Status: SHIPPED | OUTPATIENT
Start: 2022-06-30 | End: 2022-12-15

## 2022-06-30 NOTE — PROGRESS NOTES
Chief Complaint  Dina Lyons presents to [facility] for Numbness and Migraine    Subjective          History of Present Illness    Dina is here today with c/o headache. Reports has had headache daily for one month. This is mainly frontal bilaterally. She was previously diagnosed with silent migraines. She notes that she is having numbness and tingling in left upper extremity, tunnel vision, tongue numbness,bilateral facial numbness. She was seen previously on 6/23/22 by Chelsea To. She had been seen at the ER the night prior and was given a migraine cocktail. This only helped for a short time. She reports ibuprofen helps minimally. Was given toradol injection at [facility] on 6/23/22 that was not beneficial. Also was prescribed maxalt and methocarbamol. The maxalt seemed to help some. She was referred to neurology but has not heard back about appointment. She has never previously taken migraine medications.    Review of Systems      Allergies   Allergen Reactions   • Codeine Nausea And Vomiting   • Contrast Dye Hives     Patient has tolerated contrast dye when pretreated with benadryl   • Keflex [Cephalexin] Hives   • Levaquin [Levofloxacin] Swelling   • Penicillins Hives   • Sulfa Antibiotics Hives      Past Medical History:   Diagnosis Date   • Anxiety and depression    • Family history of complication of anesthesia     MOTHER HAS SEVERE NAUSEA   • History of migraine    • Ovarian cyst    • Ovarian cyst     RIGHT   • Ovarian tumor (benign), left    • Pyelonephritis      Current Outpatient Medications   Medication Sig Dispense Refill   • acetaminophen (TYLENOL) 500 MG tablet Take 500-1,000 mg by mouth Every 6 (Six) Hours As Needed for Mild Pain .     • albuterol sulfate  (90 Base) MCG/ACT inhaler Inhale 2 puffs Every 4 (Four) Hours As Needed for Wheezing.     • methocarbamol (Robaxin) 500 MG tablet Take 1 tablet by mouth 4 (Four) Times a Day. 40 tablet 1   • omeprazole  (priLOSEC) 40 MG capsule Take 1 capsule by mouth Daily. 90 capsule 1   • ondansetron ODT (Zofran ODT) 4 MG disintegrating tablet Place 1 tablet on the tongue Every 8 (Eight) Hours As Needed for Nausea or Vomiting. 30 tablet 0   • rizatriptan (MAXALT) 5 MG tablet Take 1 tablet by mouth 1 (One) Time As Needed for Migraine.  May repeat once in 2 hours if needed. 9 tablet 1   • amitriptyline (ELAVIL) 10 MG tablet Take 1 tablet by mouth Every Night. 30 tablet 1     No current facility-administered medications for this visit.     Past Surgical History:   Procedure Laterality Date   • ADENOIDECTOMY     • APPENDECTOMY     •  SECTION      X1   • CHOLECYSTECTOMY     • DIAGNOSTIC LAPAROSCOPY Left 2021    Procedure: SALPINGO OOPHORECTOMY  LAPAROSCOPY, LYSIS OF ADHESIONS;  Surgeon: Don Proctor MD;  [place] [facility] [place] [facility]  Service: Obstetrics/Gynecology;  Laterality: Left;   • FLAT FOOT RECONSTRUCTION Bilateral 2006   • LAPAROTOMY SALPINGO OOPHORECTOMY Left 2021   • OVARY SURGERY Left     TO REMOVE BENIGN OVARIAN TUMOR      Social History     Tobacco Use   • Smoking status: Former Smoker     Packs/day: 0.50     Years: 15.00     Pack years: 7.50     Types: Cigarettes     Quit date: 2021     Years since quittin.2   • Smokeless tobacco: Never Used   • Tobacco comment: none in the past 4 weeks   Vaping Use   • Vaping Use: Never used   Substance Use Topics   • Alcohol use: Yes     Comment: occasionally   • Drug use: Never     Family History   Problem Relation Age of Onset   • No Known Problems Father    • No Known Problems Mother    • Malig Hyperthermia Neg Hx      Health Maintenance Due   Topic Date Due   • COVID-19 Vaccine (1) Never done   • Pneumococcal Vaccine 0-64 (1 - PCV) Never done   • TDAP/TD VACCINES (2 - Tdap) 2014   • HEPATITIS C SCREENING  Never done      Immunization History   Administered Date(s) Administered   • Flu Vaccine Intradermal Quad 18-64YR 2008   •  "HPV Quadrivalent 08/15/2008, 03/30/2009, 07/22/2009   • Hep B, Adolescent or Pediatric 01/20/2004, 02/23/2004, 08/04/2004   • MMR 08/23/2000   • Td 01/20/2004        Objective     Vitals:    06/30/22 1305   BP: 125/75   BP Location: Right arm   Patient Position: Sitting   Pulse: 69   Temp: 98.5 °F (36.9 °C)   TempSrc: Oral   Weight: 98 kg (216 lb)   Height: 170.2 cm (67.01\")     Body mass index is 33.82 kg/m².     Physical Exam  Vitals reviewed.   Constitutional:       General: She is not in acute distress.     Appearance: Normal appearance. She is well-developed.   HENT:      Head: Normocephalic and atraumatic.   Cardiovascular:      Rate and Rhythm: Normal rate and regular rhythm.   Pulmonary:      Effort: Pulmonary effort is normal.      Breath sounds: Normal breath sounds.   Neurological:      Mental Status: She is alert and oriented to person, place, and time.   Psychiatric:         Mood and Affect: Mood and affect normal.           Result Review :                               Assessment and Plan      Diagnoses and all orders for this visit:    1. Intractable migraine with aura with status migrainosus (Primary)  -     amitriptyline (ELAVIL) 10 MG tablet; Take 1 tablet by mouth Every Night.  Dispense: 30 tablet; Refill: 1  -     rizatriptan (MAXALT) 5 MG tablet; Take 1 tablet by mouth 1 (One) Time As Needed for Migraine.  May repeat once in 2 hours if needed.  Dispense: 9 tablet; Refill: 1      Will start daily amitriptyline. She did find some benefit from rizatriptan so will refill. Additionally, will have referrals check on status of neurology referral.           Follow Up     Return in about 4 weeks (around 7/28/2022) for with PCP, Recheck.             "

## 2022-07-05 ENCOUNTER — HOSPITAL ENCOUNTER (OUTPATIENT)
Dept: MRI IMAGING | Facility: HOSPITAL | Age: 32
Discharge: HOME | End: 2022-07-05
Admitting: NURSE PRACTITIONER

## 2022-07-05 ENCOUNTER — OFFICE VISIT (OUTPATIENT)
Dept: NEUROLOGY | Facility: CLINIC | Age: 32
End: 2022-07-05

## 2022-07-05 ENCOUNTER — TELEPHONE (OUTPATIENT)
Dept: NEUROLOGY | Facility: OTHER | Age: 32
End: 2022-07-05

## 2022-07-05 VITALS
HEIGHT: 67 IN | BODY MASS INDEX: 33.29 KG/M2 | HEART RATE: 71 BPM | DIASTOLIC BLOOD PRESSURE: 86 MMHG | WEIGHT: 212.1 LBS | SYSTOLIC BLOOD PRESSURE: 130 MMHG

## 2022-07-05 DIAGNOSIS — G43.111 INTRACTABLE MIGRAINE WITH AURA WITH STATUS MIGRAINOSUS: ICD-10-CM

## 2022-07-05 DIAGNOSIS — R53.1 LEFT-SIDED WEAKNESS: ICD-10-CM

## 2022-07-05 DIAGNOSIS — R53.1 LEFT-SIDED WEAKNESS: Primary | ICD-10-CM

## 2022-07-05 DIAGNOSIS — R20.0 LEFT SIDED NUMBNESS: ICD-10-CM

## 2022-07-05 PROCEDURE — 70553 MRI BRAIN STEM W/O & W/DYE: CPT

## 2022-07-05 PROCEDURE — 99215 OFFICE O/P EST HI 40 MIN: CPT | Performed by: NURSE PRACTITIONER

## 2022-07-05 PROCEDURE — 0 GADOBENATE DIMEGLUMINE 529 MG/ML SOLUTION: Performed by: NURSE PRACTITIONER

## 2022-07-05 PROCEDURE — A9577 INJ MULTIHANCE: HCPCS | Performed by: NURSE PRACTITIONER

## 2022-07-05 RX ORDER — TOPIRAMATE 50 MG/1
50 TABLET, FILM COATED ORAL 2 TIMES DAILY
Qty: 60 TABLET | Refills: 3 | Status: SHIPPED | OUTPATIENT
Start: 2022-07-05

## 2022-07-05 RX ORDER — RIZATRIPTAN BENZOATE 10 MG/1
10 TABLET ORAL ONCE AS NEEDED
Qty: 12 TABLET | Refills: 3 | Status: SHIPPED | OUTPATIENT
Start: 2022-07-05 | End: 2022-12-15 | Stop reason: SDUPTHER

## 2022-07-05 RX ADMIN — GADOBENATE DIMEGLUMINE 20 ML: 529 INJECTION, SOLUTION INTRAVENOUS at 13:51

## 2022-07-05 NOTE — TELEPHONE ENCOUNTER
Patient states she missed call from neurology office. I was able to warm transfer call to Joy in clinical.

## 2022-07-05 NOTE — ASSESSMENT & PLAN NOTE
Symptoms could be secondary to complex hemiplegic status migraine.  However, has left sided weakness and numbness and no previous brain imaging.  Will order stat MRI brain.  Will order labs.  D/C amitriptyline as she is not tolerating it.  Will start topiramate for preventative migraine therapy and increase maxalt to 10mg for abortive therapy.

## 2022-07-05 NOTE — PROGRESS NOTES
"Chief Complaint  Migraine and Numbness (Left arm)    Opal Lyons presents to [facility]  States she's had left arm numbness for past 2 weeks.  States she's had intermittent tongue and lip numbness for the past 2 weeks as well.  Some intermittent right arm numbness.  Has had nearly daily headache, associated with tunnel vision, for the last month.  Has history of infrequent migraines, 1 in 6 months or so.  With those migraines, did have numbness associated.  Uses maxalt PRN for abortive therapy with the headache, and that has been somewhat helpful, but does not affect the numbness.  Endorses nausea and photophobia.  Sometimes phonophobia.        Previous preventative migraine medications: amitriptyline (drowsiness),   Previous abortive migraine medications: maxalt,       Objective   Vital Signs:   /86   Pulse 71   Ht 170.2 cm (67\")   Wt 96.2 kg (212 lb 1.6 oz)   BMI 33.22 kg/m²     Physical Exam  Neurological:      Mental Status: She is oriented to person, place, and time.      Gait: Gait is intact.      Deep Tendon Reflexes:      Reflex Scores:       Brachioradialis reflexes are 2+ on the right side and 2+ on the left side.       Patellar reflexes are 2+ on the right side and 2+ on the left side.       Neurologic Exam     Mental Status   Oriented to person, place, and time.     Cranial Nerves   Cranial nerves II through XII intact.     Motor Exam   Muscle bulk: normal    Strength   Right deltoid: 5/5  Left deltoid: 4/5  Right biceps: 5/5  Left biceps: 4/5  Right triceps: 5/5  Left triceps: 4/5  Right iliopsoas: 5/5  Left iliopsoas: 5/5  Right quadriceps: 5/5  Left quadriceps: 5/5  Right hamstrin/5  Left hamstrin/5  Right anterior tibial: 5/5  Left anterior tibial: 5/5  Right posterior tibial: 5/5  Left posterior tibial: 5/5    Sensory Exam   Absent pinprick to left upper extremity.  Decreased pinprick to left lower extremity.      Gait, " Coordination, and Reflexes     Gait  Gait: normal    Reflexes   Right brachioradialis: 2+  Left brachioradialis: 2+  Right patellar: 2+  Left patellar: 2+       Result Review :               Assessment and Plan    Diagnoses and all orders for this visit:    1. Left-sided weakness (Primary)  -     MRI Brain With & Without Contrast; Future  -     CBC (No Diff); Future  -     Comprehensive Metabolic Panel; Future  -     Vitamin B12 & Folate; Future    2. Intractable migraine with aura with status migrainosus  -     CBC (No Diff); Future  -     Comprehensive Metabolic Panel; Future  -     Vitamin B12 & Folate; Future    3. Left sided numbness  Assessment & Plan:  Symptoms could be secondary to complex hemiplegic status migraine.  However, has left sided weakness and numbness and no previous brain imaging.  Will order stat MRI brain.  Will order labs.  D/C amitriptyline as she is not tolerating it.  Will start topiramate for preventative migraine therapy and increase maxalt to 10mg for abortive therapy.      Orders:  -     MRI Brain With & Without Contrast; Future  -     CBC (No Diff); Future  -     Comprehensive Metabolic Panel; Future  -     Vitamin B12 & Folate; Future    Other orders  -     rizatriptan (Maxalt) 10 MG tablet; Take 1 tablet by mouth 1 (One) Time As Needed for Migraine. May repeat in 2 hours if needed  Dispense: 12 tablet; Refill: 3  -     topiramate (TOPAMAX) 50 MG tablet; Take 1 tablet by mouth 2 (Two) Times a Day. Take 1 tablet qHS for 2 weeks, then increase to BID  Dispense: 60 tablet; Refill: 3    I spent 45 minutes caring for Dina on this date of service. This time includes time spent by me in the following activities:preparing for the visit, reviewing tests, obtaining and/or reviewing a separately obtained history, performing a medically appropriate examination and/or evaluation , counseling and educating the patient/family/caregiver, ordering medications, tests, or procedures, documenting  information in the medical record and independently interpreting results and communicating that information with the patient/family/caregiver  Follow Up   Return in about 1 week (around 7/12/2022) for left sided numbness/migraines.  Patient was given instructions and counseling regarding her condition or for health maintenance advice. Please see specific information pulled into the AVS if appropriate.

## 2022-07-06 ENCOUNTER — LAB (OUTPATIENT)
Dept: LAB | Facility: HOSPITAL | Age: 32
End: 2022-07-06

## 2022-07-06 DIAGNOSIS — G43.111 INTRACTABLE MIGRAINE WITH AURA WITH STATUS MIGRAINOSUS: ICD-10-CM

## 2022-07-06 DIAGNOSIS — R53.1 LEFT-SIDED WEAKNESS: ICD-10-CM

## 2022-07-06 DIAGNOSIS — R20.0 LEFT SIDED NUMBNESS: ICD-10-CM

## 2022-07-06 LAB
ALBUMIN SERPL-MCNC: 4 G/DL (ref 3.5–5.2)
ALBUMIN/GLOB SERPL: 1.5 G/DL
ALP SERPL-CCNC: 57 U/L (ref 39–117)
ALT SERPL W P-5'-P-CCNC: 24 U/L (ref 1–33)
ANION GAP SERPL CALCULATED.3IONS-SCNC: 11.3 MMOL/L (ref 5–15)
AST SERPL-CCNC: 17 U/L (ref 1–32)
BILIRUB SERPL-MCNC: 0.9 MG/DL (ref 0–1.2)
BUN SERPL-MCNC: 8 MG/DL (ref 6–20)
BUN/CREAT SERPL: 10.8 (ref 7–25)
CALCIUM SPEC-SCNC: 9.2 MG/DL (ref 8.6–10.5)
CHLORIDE SERPL-SCNC: 105 MMOL/L (ref 98–107)
CO2 SERPL-SCNC: 21.7 MMOL/L (ref 22–29)
CREAT SERPL-MCNC: 0.74 MG/DL (ref 0.57–1)
DEPRECATED RDW RBC AUTO: 43.6 FL (ref 37–54)
EGFRCR SERPLBLD CKD-EPI 2021: 110.4 ML/MIN/1.73
ERYTHROCYTE [DISTWIDTH] IN BLOOD BY AUTOMATED COUNT: 13.5 % (ref 12.3–15.4)
GLOBULIN UR ELPH-MCNC: 2.6 GM/DL
GLUCOSE SERPL-MCNC: 90 MG/DL (ref 65–99)
HCT VFR BLD AUTO: 40.1 % (ref 34–46.6)
HGB BLD-MCNC: 12.9 G/DL (ref 12–15.9)
MCH RBC QN AUTO: 28.3 PG (ref 26.6–33)
MCHC RBC AUTO-ENTMCNC: 32.2 G/DL (ref 31.5–35.7)
MCV RBC AUTO: 87.9 FL (ref 79–97)
PLATELET # BLD AUTO: 209 10*3/MM3 (ref 140–450)
PMV BLD AUTO: 9.6 FL (ref 6–12)
POTASSIUM SERPL-SCNC: 4.1 MMOL/L (ref 3.5–5.2)
PROT SERPL-MCNC: 6.6 G/DL (ref 6–8.5)
RBC # BLD AUTO: 4.56 10*6/MM3 (ref 3.77–5.28)
SODIUM SERPL-SCNC: 138 MMOL/L (ref 136–145)
WBC NRBC COR # BLD: 7.54 10*3/MM3 (ref 3.4–10.8)

## 2022-07-06 PROCEDURE — 82746 ASSAY OF FOLIC ACID SERUM: CPT

## 2022-07-06 PROCEDURE — 85027 COMPLETE CBC AUTOMATED: CPT

## 2022-07-06 PROCEDURE — 82607 VITAMIN B-12: CPT

## 2022-07-06 PROCEDURE — 36415 COLL VENOUS BLD VENIPUNCTURE: CPT

## 2022-07-06 PROCEDURE — 80053 COMPREHEN METABOLIC PANEL: CPT

## 2022-07-07 LAB
FOLATE SERPL-MCNC: 4.95 NG/ML (ref 4.78–24.2)
VIT B12 BLD-MCNC: 347 PG/ML (ref 211–946)

## 2022-07-12 ENCOUNTER — OFFICE VISIT (OUTPATIENT)
Dept: NEUROLOGY | Facility: CLINIC | Age: 32
End: 2022-07-12

## 2022-07-12 ENCOUNTER — TELEPHONE (OUTPATIENT)
Dept: FAMILY MEDICINE CLINIC | Age: 32
End: 2022-07-12

## 2022-07-12 VITALS
HEIGHT: 67 IN | SYSTOLIC BLOOD PRESSURE: 113 MMHG | WEIGHT: 213.2 LBS | OXYGEN SATURATION: 98 % | BODY MASS INDEX: 33.46 KG/M2 | HEART RATE: 69 BPM | DIASTOLIC BLOOD PRESSURE: 70 MMHG

## 2022-07-12 DIAGNOSIS — G43.111 INTRACTABLE MIGRAINE WITH AURA WITH STATUS MIGRAINOSUS: ICD-10-CM

## 2022-07-12 DIAGNOSIS — R53.1 LEFT-SIDED WEAKNESS: Primary | ICD-10-CM

## 2022-07-12 PROCEDURE — 99214 OFFICE O/P EST MOD 30 MIN: CPT | Performed by: NURSE PRACTITIONER

## 2022-07-12 RX ORDER — METHYLPREDNISOLONE 4 MG/1
TABLET ORAL
Qty: 21 EACH | Refills: 0 | Status: SHIPPED | OUTPATIENT
Start: 2022-07-12 | End: 2022-12-15

## 2022-07-12 NOTE — PROGRESS NOTES
"Chief Complaint  Follow-up and left sided weakness    Opal Lyons presents to [facility]  Following up for MRI results.  Some improvement in headache severity since starting topiramate. Still continuing to have some degree of left sided weakness. 10mg maxalt is more effective for abortive therapy.      Interval History:   States she's had left arm numbness for past 2 weeks.  States she's had intermittent tongue and lip numbness for the past 2 weeks as well.  Some intermittent right arm numbness.  Has had nearly daily headache, associated with tunnel vision, for the last month.  Has history of infrequent migraines, 1 in 6 months or so.  With those migraines, did have numbness associated.  Uses maxalt PRN for abortive therapy with the headache, and that has been somewhat helpful, but does not affect the numbness.  Endorses nausea and photophobia.  Sometimes phonophobia.        Previous preventative migraine medications: amitriptyline (drowsiness), topiramate  Previous abortive migraine medications: maxalt,       Objective   Vital Signs:   /70   Pulse 69   Ht 170.2 cm (67.01\")   Wt 96.7 kg (213 lb 3.2 oz)   SpO2 98%   BMI 33.38 kg/m²     Physical Exam  Neurological:      Mental Status: She is oriented to person, place, and time.      Gait: Gait is intact.      Deep Tendon Reflexes:      Reflex Scores:       Brachioradialis reflexes are 2+ on the right side and 2+ on the left side.       Patellar reflexes are 2+ on the right side and 2+ on the left side.       Neurologic Exam     Mental Status   Oriented to person, place, and time.     Cranial Nerves   Cranial nerves II through XII intact.     Motor Exam   Muscle bulk: normal    Strength   Right deltoid: 5/5  Left deltoid: 4/5  Right biceps: 5/5  Left biceps: 4/5  Right triceps: 5/5  Left triceps: 4/5  Right iliopsoas: 5/5  Left iliopsoas: 5/5  Right quadriceps: 5/5  Left quadriceps: 5/5  Right " hamstrin/5  Left hamstrin/5  Right anterior tibial: 5/5  Left anterior tibial: 5/5  Right posterior tibial: 5/5  Left posterior tibial: 5/5    Sensory Exam   Decreased pinprick to left upper extremity.  Decreased pinprick to left lower extremity.      Gait, Coordination, and Reflexes     Gait  Gait: normal    Reflexes   Right brachioradialis: 2+  Left brachioradialis: 2+  Right patellar: 2+  Left patellar: 2+       Result Review :             MRI Brain: Normal    Assessment and Plan    Diagnoses and all orders for this visit:    1. Left-sided weakness (Primary)  Assessment & Plan:  Will order C Spine MRI due to lasting left sided weakness to ensure no cervical origin of symptoms.     Orders:  -     MRI Cervical Spine Without Contrast; Future    2. Intractable migraine with aura with status migrainosus  Assessment & Plan:  Symptoms likely a progression of hemiplegic status migraine. Will order medrol dosepack.  Will continue topiramate for preventative therapy and maxalt PRN for abortive therapy.           Other orders  -     methylPREDNISolone (MEDROL) 4 MG dose pack; Take as directed on package instructions.  Dispense: 21 each; Refill: 0      Follow Up   Return in 2 months (on 2022) for Migraine f/u.  Patient was given instructions and counseling regarding her condition or for health maintenance advice. Please see specific information pulled into the AVS if appropriate.

## 2022-07-12 NOTE — ASSESSMENT & PLAN NOTE
Will order C Spine MRI due to lasting left sided weakness to ensure no cervical origin of symptoms.

## 2022-07-12 NOTE — ASSESSMENT & PLAN NOTE
Symptoms likely a progression of hemiplegic status migraine. Will order medrol dosepack.  Will continue topiramate for preventative therapy and maxalt PRN for abortive therapy.

## 2022-07-29 ENCOUNTER — TELEPHONE (OUTPATIENT)
Dept: FAMILY MEDICINE CLINIC | Age: 32
End: 2022-07-29

## 2022-08-08 ENCOUNTER — APPOINTMENT (OUTPATIENT)
Dept: MRI IMAGING | Facility: HOSPITAL | Age: 32
End: 2022-08-08

## 2022-12-02 NOTE — ASSESSMENT & PLAN NOTE
-Although the patient symptoms are much improved she is still wheezing on physical exam  -I have high suspicion that this is related to asthma especially since she had asthma as a child, and giver her CT results  -I would like for her to be on inhaled steroid for now but she is unable to afford fluticasone so we will find cheaper alternative to send in   -Continue albuterol inhaler as needed   Subjective   Patient ID: Hanh is a 34 year old female @ 34w1d gestation who presents today for prenatal visit.   OB History    Para Term  AB Living   7 4 3 1 2 4   SAB IAB Ectopic Molar Multiple Live Births   1 1 0 0 0 4        Patient reports feeling well, no complaints.    positive fetal movement, No bleeding, No rupture of membranes, No uterine contractions. No HA, vision changes.    OBJECTIVE:  Visit Vitals  /74   Pulse 96   Temp 96.9 °F (36.1 °C) (Temporal)   Ht 5' 6\" (1.676 m)   Wt 110.2 kg (243 lb 0.9 oz)   LMP 2022 (Within Days)   SpO2 100%   BMI 39.23 kg/m²       Gen: NAD  FH: See episode  Abd: soft, gravid, NT  Ext: NT/NE    ASSESSMENT:  Pregnancy at 34w1d weeks gestation.  Problem List Items Addressed This Visit        Endocrine and Metabolic    Obesity (BMI 30-39.9)       Gravid and     Pregnancy - Primary    AMA (advanced maternal age) multigravida 35+          PLAN:  Follow up visit in 2 weeks  3h gtt not done yet, plans to schedule  Repeat US previously ordered, will schedule    Kick counts /  labor precautions given  Preeclampsia precautions given    Isaura Weiss DO

## 2022-12-15 ENCOUNTER — OFFICE VISIT (OUTPATIENT)
Dept: FAMILY MEDICINE CLINIC | Age: 32
End: 2022-12-15

## 2022-12-15 VITALS
DIASTOLIC BLOOD PRESSURE: 70 MMHG | HEART RATE: 81 BPM | BODY MASS INDEX: 32.52 KG/M2 | TEMPERATURE: 98.8 F | HEIGHT: 67 IN | WEIGHT: 207.2 LBS | SYSTOLIC BLOOD PRESSURE: 118 MMHG

## 2022-12-15 DIAGNOSIS — L02.415 ABSCESS OF RIGHT THIGH: Primary | ICD-10-CM

## 2022-12-15 DIAGNOSIS — G43.819 OTHER MIGRAINE WITHOUT STATUS MIGRAINOSUS, INTRACTABLE: ICD-10-CM

## 2022-12-15 PROCEDURE — 99214 OFFICE O/P EST MOD 30 MIN: CPT | Performed by: NURSE PRACTITIONER

## 2022-12-15 PROCEDURE — 87205 SMEAR GRAM STAIN: CPT | Performed by: NURSE PRACTITIONER

## 2022-12-15 PROCEDURE — 87186 SC STD MICRODIL/AGAR DIL: CPT | Performed by: NURSE PRACTITIONER

## 2022-12-15 PROCEDURE — 87070 CULTURE OTHR SPECIMN AEROBIC: CPT | Performed by: NURSE PRACTITIONER

## 2022-12-15 PROCEDURE — 87147 CULTURE TYPE IMMUNOLOGIC: CPT | Performed by: NURSE PRACTITIONER

## 2022-12-15 RX ORDER — RIZATRIPTAN BENZOATE 10 MG/1
10 TABLET ORAL ONCE AS NEEDED
Qty: 12 TABLET | Refills: 3 | Status: SHIPPED | OUTPATIENT
Start: 2022-12-15

## 2022-12-15 RX ORDER — DOXYCYCLINE HYCLATE 100 MG/1
100 CAPSULE ORAL 2 TIMES DAILY
Qty: 20 CAPSULE | Refills: 0 | Status: SHIPPED | OUTPATIENT
Start: 2022-12-15 | End: 2022-12-25

## 2022-12-15 NOTE — PROGRESS NOTES
"Dina Lyons presents to [facility] with complaint of  Insect Bite (Been present for 1.5 weeks, back of right thigh. )    SUBJECTIVE  History of Present Illness     Patient presents today with back right upper thigh lesion that she says is red and draining.  Patient believes that this was an insect bite, does not recall seeing insect present on her leg.  She says that over the past 1-1/2 weeks, the wound has gradually decreased in size and is not as painful as it was originally.  Patient also reports that she was initially having chills and running a fever.  She has been fever free and chill free for the past 2 days.  She says the wound ruptured on its own 1 to 2 days ago, was feeling very firm but is now not as firm.  Wound has been draining purulent discharge for the past day.  Patient has been irrigating wound with hydrogen peroxide.  Patient has been keeping a Band-Aid over this area.    Patient is also requesting refills of her Maxalt for her migraines.  This medication was started by neurology.  Patient reports that Maxalt helped her migraines significantly.    OBJECTIVE  Vital Signs:   /70 Comment: manual recheck  Pulse 81   Temp 98.8 °F (37.1 °C) (Oral)   Ht 170.2 cm (67.01\")   Wt 94 kg (207 lb 3.2 oz)   BMI 32.44 kg/m²       Physical Exam  Vitals reviewed.   Constitutional:       General: She is not in acute distress.     Appearance: Normal appearance. She is not ill-appearing.   HENT:      Head: Normocephalic and atraumatic.      Nose: Nose normal.      Mouth/Throat:      Mouth: Mucous membranes are moist.      Pharynx: Oropharynx is clear.   Cardiovascular:      Rate and Rhythm: Normal rate and regular rhythm.      Pulses: Normal pulses.      Heart sounds: Normal heart sounds.   Pulmonary:      Effort: Pulmonary effort is normal.      Breath sounds: Normal breath sounds.   Musculoskeletal:      Cervical back: Neck supple.   Skin:     General: Skin is warm and " dry.      Findings: Wound present.      Comments: Refer to photo below   Neurological:      General: No focal deficit present.      Mental Status: She is alert and oriented to person, place, and time. Mental status is at baseline.   Psychiatric:         Mood and Affect: Mood normal.         Behavior: Behavior normal.         Judgment: Judgment normal.               ASSESSMENT AND PLAN:  Diagnoses and all orders for this visit:    1. Abscess of right thigh (Primary)  Assessment & Plan:  - Wound was irrigated with 18-gauge needle and normal saline today in office  -moderate amount of sloughing tissue was able to be removed  -Wound was packed with 1/4 inch packing strips soaked with NS  -covered with gauze and secured with Band-Aid  -Patient was able to provide teach back of wound care, patient was instructed to do wound care as above twice per day for the next 4 days  -We will follow-up for wound check on Monday  -Discussed signs and symptoms of sepsis with patient and she understands she needs to report to the ER if these were to occur  -Start patient on doxycycline  -Send wound culture  -If not significantly improving, send to wound care or general surgery    Orders:  -     doxycycline (VIBRAMYCIN) 100 MG capsule; Take 1 capsule by mouth 2 (Two) Times a Day for 10 days.  Dispense: 20 capsule; Refill: 0  -     Wound Culture - Wound, Thigh, Right; Future    2. Other migraine without status migrainosus, intractable  -     rizatriptan (Maxalt) 10 MG tablet; Take 1 tablet by mouth 1 (One) Time As Needed for Migraine. May repeat in 2 hours if needed  Dispense: 12 tablet; Refill: 3        Follow Up   Return in about 4 days (around 12/19/2022). Patient to notify office with any acute concerns or issues.  Patient verbalizes understanding, agrees with plan of care and has no further questions upon discharge.     Patient was given instructions and counseling regarding her condition or for health maintenance advice. Please see  specific information pulled into the AVS if appropriate.     Discussed the importance of following up with any needed screening tests/labs/specialist appointments and any requested follow-up recommended by me today. Importance of maintaining follow-up discussed and patient accepts that missed appointments can delay diagnosis and potentially lead to worsening of conditions.

## 2022-12-15 NOTE — ASSESSMENT & PLAN NOTE
- Wound was irrigated with 18-gauge needle and normal saline today in office  -moderate amount of sloughing tissue was able to be removed  -Wound was packed with 1/4 inch packing strips soaked with NS  -covered with gauze and secured with Band-Aid  -Patient was able to provide teach back of wound care, patient was instructed to do wound care as above twice per day for the next 4 days  -We will follow-up for wound check on Monday  -Discussed signs and symptoms of sepsis with patient and she understands she needs to report to the ER if these were to occur  -Start patient on doxycycline  -Send wound culture  -If not significantly improving, send to wound care or general surgery

## 2022-12-17 LAB
BACTERIA SPEC AEROBE CULT: ABNORMAL
GRAM STN SPEC: ABNORMAL
GRAM STN SPEC: ABNORMAL

## 2022-12-19 ENCOUNTER — OFFICE VISIT (OUTPATIENT)
Dept: FAMILY MEDICINE CLINIC | Age: 32
End: 2022-12-19

## 2022-12-19 VITALS
SYSTOLIC BLOOD PRESSURE: 121 MMHG | WEIGHT: 209 LBS | BODY MASS INDEX: 32.8 KG/M2 | DIASTOLIC BLOOD PRESSURE: 77 MMHG | HEART RATE: 70 BPM | HEIGHT: 67 IN

## 2022-12-19 DIAGNOSIS — Z22.322 MRSA (METHICILLIN RESISTANT STAPH AUREUS) CULTURE POSITIVE: Primary | ICD-10-CM

## 2022-12-19 DIAGNOSIS — L02.415 ABSCESS OF RIGHT THIGH: ICD-10-CM

## 2022-12-19 PROCEDURE — 99213 OFFICE O/P EST LOW 20 MIN: CPT | Performed by: NURSE PRACTITIONER

## 2022-12-19 RX ORDER — GINSENG 100 MG
1 CAPSULE ORAL 2 TIMES DAILY
Qty: 28 G | Refills: 0 | Status: SHIPPED | OUTPATIENT
Start: 2022-12-19 | End: 2023-02-16

## 2022-12-19 NOTE — PROGRESS NOTES
"Dina Lyons presents to [facility] with complaint of  Wound Check    SUBJECTIVE  Wound Check  Treated in ED: 12/15/2022 (4 days ago) Previous treatment included wound cleansing or irrigation and oral antibiotics. Maximum temperature: no reported fever since last seen in office  There has been clear discharge from the wound. The redness has improved. The swelling has improved. The pain has improved. She has no difficulty moving the affected extremity or digit.      Patient packed wound with 1/4 NS for 3 days. Wound is now superficial, no packing required.     OBJECTIVE  Vital Signs:   /77 (BP Location: Left arm, Patient Position: Sitting)   Pulse 70   Ht 170.2 cm (67.01\")   Wt 94.8 kg (209 lb)   BMI 32.72 kg/m²       Physical Exam  Vitals reviewed.   Constitutional:       General: She is not in acute distress.     Appearance: Normal appearance. She is not ill-appearing.   HENT:      Head: Normocephalic and atraumatic.      Nose: Nose normal.      Mouth/Throat:      Mouth: Mucous membranes are moist.      Pharynx: Oropharynx is clear.   Cardiovascular:      Rate and Rhythm: Normal rate and regular rhythm.      Pulses: Normal pulses.      Heart sounds: Normal heart sounds.   Pulmonary:      Effort: Pulmonary effort is normal.      Breath sounds: Normal breath sounds.   Musculoskeletal:      Cervical back: Neck supple.   Skin:     General: Skin is warm and dry.      Findings: Wound (0.5 cm opening, superficial, much improved, no drainage seen today, 1 cm surrounding tissue of wound opening has mild erythema and induration .) present.   Neurological:      General: No focal deficit present.      Mental Status: She is alert and oriented to person, place, and time. Mental status is at baseline.   Psychiatric:         Mood and Affect: Mood normal.         Behavior: Behavior normal.         Judgment: Judgment normal.          Results Review:  The following data was reviewed by Liset" JAMES Akbar [unfilled] 09:40 EST.    Office Visit on 12/15/2022   Component Date Value Ref Range Status   • Wound Culture 12/15/2022 Moderate growth (3+) Staphylococcus aureus, MRSA (A)   Final   • Gram Stain 12/15/2022 Rare (1+) WBCs seen   Final   • Gram Stain 12/15/2022 No organisms seen   Final         ASSESSMENT AND PLAN:  Diagnoses and all orders for this visit:    1. MRSA (methicillin resistant staph aureus) culture positive (Primary)    2. Abscess of right thigh  -     bacitracin 500 UNIT/GM ointment; Apply 1 application topically to the appropriate area as directed 2 (Two) Times a Day.  Dispense: 28 g; Refill: 0      -much improved, cont doxy   -continue to irrigate bid with saline/water, pat dry, apply bacitracin, cover with Band-Aid until completely closed (performed in office today)  -follow up if wound fails to show signs of continued improvement (fever, increased pain/redness/drainage etc)    Follow Up   Return if symptoms worsen or fail to improve. Patient to notify office with any acute concerns or issues.  Patient verbalizes understanding, agrees with plan of care and has no further questions upon discharge.     Patient was given instructions and counseling regarding her condition or for health maintenance advice. Please see specific information pulled into the AVS if appropriate.

## 2023-01-03 ENCOUNTER — TELEPHONE (OUTPATIENT)
Dept: NEUROLOGY | Facility: CLINIC | Age: 33
End: 2023-01-03
Payer: COMMERCIAL

## 2023-01-03 NOTE — TELEPHONE ENCOUNTER
PATIENT IS SCHEDULED FOR MRI TOMORROW 1/4  [facility] WANTS TO KNOW IF THE MRI CAN BE PUSHED OUT FARTHER DUE TO PATIENT'S INSURANCE. PLEASE CALL MONICA @ [phone]

## 2023-01-03 NOTE — TELEPHONE ENCOUNTER
Kiana states pt has new insurance and they have not been able to get with her for the new insurance info. She will be rs'd after insurance updated

## 2023-01-04 ENCOUNTER — HOSPITAL ENCOUNTER (OUTPATIENT)
Dept: MRI IMAGING | Facility: HOSPITAL | Age: 33
End: 2023-01-04

## 2023-02-16 ENCOUNTER — OFFICE VISIT (OUTPATIENT)
Dept: FAMILY MEDICINE CLINIC | Age: 33
End: 2023-02-16

## 2023-02-16 VITALS
WEIGHT: 217 LBS | HEART RATE: 75 BPM | SYSTOLIC BLOOD PRESSURE: 137 MMHG | HEIGHT: 67 IN | DIASTOLIC BLOOD PRESSURE: 87 MMHG | TEMPERATURE: 98.6 F | BODY MASS INDEX: 34.06 KG/M2

## 2023-02-16 DIAGNOSIS — R03.0 ELEVATED BLOOD PRESSURE READING: ICD-10-CM

## 2023-02-16 DIAGNOSIS — H10.32 ACUTE BACTERIAL CONJUNCTIVITIS OF LEFT EYE: Primary | ICD-10-CM

## 2023-02-16 PROCEDURE — 99213 OFFICE O/P EST LOW 20 MIN: CPT | Performed by: NURSE PRACTITIONER

## 2023-02-16 RX ORDER — POLYMYXIN B SULFATE AND TRIMETHOPRIM 1; 10000 MG/ML; [USP'U]/ML
1 SOLUTION OPHTHALMIC 4 TIMES DAILY
Qty: 10 ML | Refills: 0 | Status: SHIPPED | OUTPATIENT
Start: 2023-02-16 | End: 2023-04-07

## 2023-02-16 NOTE — PROGRESS NOTES
"Dina Lyons presents to [facility] with complaint of  Conjunctivitis (Left eye x 2 days)    SUBJECTIVE  Conjunctivitis   The current episode started 2 days ago. The onset was sudden. The problem has been gradually worsening. The problem is moderate. Associated symptoms include eye itching, eye discharge, eye pain and eye redness. Pertinent negatives include no fever, no decreased vision, no double vision, no photophobia, no congestion, no ear discharge, no ear pain, no headaches, no hearing loss, no mouth sores, no rhinorrhea, no sore throat, no stridor and no swollen glands. The eye pain is mild. The left eye is affected. The eyelid exhibits swelling and redness.      Blood pressure is mildly elevated today.  Patient has gained 8 lbs since her last office visit back in December.      OBJECTIVE  Vital Signs:   /87 (BP Location: Left arm, Patient Position: Sitting, Cuff Size: Adult)   Pulse 75   Temp 98.6 °F (37 °C) (Oral)   Ht 170.2 cm (67.01\")   Wt 98.4 kg (217 lb)   BMI 33.98 kg/m²       Physical Exam  Vitals reviewed.   Constitutional:       General: She is not in acute distress.     Appearance: Normal appearance. She is obese. She is not ill-appearing.   HENT:      Head: Normocephalic and atraumatic.      Nose: Nose normal.      Mouth/Throat:      Mouth: Mucous membranes are moist.      Pharynx: Oropharynx is clear.   Eyes:      General: Lids are everted, no foreign bodies appreciated. Vision grossly intact. Gaze aligned appropriately.         Right eye: Discharge present. No hordeolum.      Extraocular Movements: Extraocular movements intact.      Conjunctiva/sclera:      Right eye: Right conjunctiva is injected.   Cardiovascular:      Rate and Rhythm: Normal rate and regular rhythm.      Pulses: Normal pulses.      Heart sounds: Normal heart sounds.   Pulmonary:      Effort: Pulmonary effort is normal.      Breath sounds: Normal breath sounds.   Musculoskeletal:     "  Cervical back: Neck supple.   Skin:     General: Skin is warm and dry.   Neurological:      General: No focal deficit present.      Mental Status: She is alert and oriented to person, place, and time. Mental status is at baseline.   Psychiatric:         Mood and Affect: Mood normal.         Behavior: Behavior normal.         Judgment: Judgment normal.          ASSESSMENT AND PLAN:  Diagnoses and all orders for this visit:    1. Acute bacterial conjunctivitis of left eye (Primary)  -     trimethoprim-polymyxin b (POLYTRIM) 61613-8.1 UNIT/ML-% ophthalmic solution; Administer 1 drop into the left eye 4 (Four) Times a Day for 7 days.  Dispense: 10 mL; Refill: 0    2. Elevated blood pressure reading      -may use warm compresses for 15-minute intervals multiple times throughout the day for comfort  -She was instructed to use good handwashing  -She does not wear contact lenses  -She was advised to discard any old make-up/make-up brushes and get new ones   -if not improving, notify office   -Patient was encouraged to check her blood pressure at home, she may come to the office to have blood pressure checked at her convenience  -If she finds blood pressures consistently greater than 135/85, needs to follow up regarding this      Follow Up   Return if symptoms worsen or fail to improve. Patient to notify office with any acute concerns or issues.  Patient verbalizes understanding, agrees with plan of care and has no further questions upon discharge.     Patient was given instructions and counseling regarding her condition or for health maintenance advice. Please see specific information pulled into the AVS if appropriate.     Discussed the importance of following up with any needed screening tests/labs/specialist appointments and any requested follow-up recommended by me today. Importance of maintaining follow-up discussed and patient accepts that missed appointments can delay diagnosis and potentially lead to worsening of  conditions.    Part of this note may be an electronic transcription/translation of spoken language to printed text using the Dragon Dictation System.

## 2023-03-21 ENCOUNTER — TELEMEDICINE (OUTPATIENT)
Dept: FAMILY MEDICINE CLINIC | Age: 33
End: 2023-03-21
Payer: MEDICAID

## 2023-03-21 VITALS — HEIGHT: 67 IN | BODY MASS INDEX: 33.98 KG/M2

## 2023-03-21 DIAGNOSIS — R05.1 ACUTE COUGH: ICD-10-CM

## 2023-03-21 DIAGNOSIS — J01.00 ACUTE NON-RECURRENT MAXILLARY SINUSITIS: Primary | ICD-10-CM

## 2023-03-21 PROCEDURE — 99213 OFFICE O/P EST LOW 20 MIN: CPT | Performed by: NURSE PRACTITIONER

## 2023-03-21 RX ORDER — AZITHROMYCIN 250 MG/1
TABLET, FILM COATED ORAL
Qty: 6 TABLET | Refills: 0 | Status: SHIPPED | OUTPATIENT
Start: 2023-03-21

## 2023-03-21 RX ORDER — DEXTROMETHORPHAN HYDROBROMIDE AND PROMETHAZINE HYDROCHLORIDE 15; 6.25 MG/5ML; MG/5ML
5 SYRUP ORAL NIGHTLY PRN
Qty: 120 ML | Refills: 0 | Status: SHIPPED | OUTPATIENT
Start: 2023-03-21

## 2023-03-21 RX ORDER — GUAIFENESIN 600 MG/1
1200 TABLET, EXTENDED RELEASE ORAL 2 TIMES DAILY
Qty: 40 TABLET | Refills: 0 | Status: SHIPPED | OUTPATIENT
Start: 2023-03-21

## 2023-03-21 NOTE — PROGRESS NOTES
"Chief Complaint  Fever (Sx started Saturday ), Generalized Body Aches, Sore Throat, Diarrhea, Wheezing, Nasal Congestion, URI, and Cough    Subjective        Dina Serena presents to [facility]  Fever   This is a new problem. The maximum temperature noted was 100 to 100.9 F. Associated symptoms include congestion, coughing, diarrhea, headaches, a sore throat and wheezing.   Cough  This is a new problem. The current episode started in the past 7 days. The problem has been gradually worsening. The cough is productive of sputum. Associated symptoms include chills, ear congestion, a fever, headaches, nasal congestion, postnasal drip, a sore throat and wheezing. Treatments tried: Tylenol cold and sinus.     Patient consent to video visit.  Patient identity confirmed.  [employer] video call was used.  Provider in office at desktop  Patient at home.    Objective   Vital Signs:  Ht 170.2 cm (67.01\")   BMI 33.98 kg/m²   Estimated body mass index is 33.98 kg/m² as calculated from the following:    Height as of this encounter: 170.2 cm (67.01\").    Weight as of 2/16/23: 98.4 kg (217 lb).             Physical Exam  HENT:      Nose: Congestion present.   Pulmonary:      Effort: No respiratory distress.   Neurological:      Mental Status: She is alert and oriented to person, place, and time.   Psychiatric:         Mood and Affect: Mood normal.        Result Review :                   Assessment and Plan   Diagnoses and all orders for this visit:    1. Acute non-recurrent maxillary sinusitis (Primary)  -     azithromycin (Zithromax Z-Milton) 250 MG tablet; Take 2 tablets by mouth on day 1, then 1 tablet daily on days 2-5  Dispense: 6 tablet; Refill: 0    2. Acute cough  -     guaiFENesin (Mucinex) 600 MG 12 hr tablet; Take 2 tablets by mouth 2 (Two) Times a Day.  Dispense: 40 tablet; Refill: 0  -     promethazine-dextromethorphan (PROMETHAZINE-DM) 6.25-15 MG/5ML syrup; Take 5 mL by mouth At Night As " Needed for Cough.  Dispense: 120 mL; Refill: 0             Follow Up   Return if symptoms worsen or fail to improve.  Patient was given instructions and counseling regarding her condition or for health maintenance advice. Please see specific information pulled into the AVS if appropriate.

## 2023-04-19 ENCOUNTER — TELEPHONE (OUTPATIENT)
Dept: NEUROSURGERY | Facility: CLINIC | Age: 33
End: 2023-04-19
Payer: MEDICAID

## 2023-04-19 NOTE — TELEPHONE ENCOUNTER
Jaycee with financial clearance called and advised unable to get in touch with patient to confirm new insurance. She would like to know if this can be rescheduled or if this is an urgent MRI.       Best contact number to reach her is     [phone]

## 2023-06-02 ENCOUNTER — OFFICE VISIT (OUTPATIENT)
Dept: FAMILY MEDICINE CLINIC | Age: 33
End: 2023-06-02

## 2023-06-02 VITALS
TEMPERATURE: 98.8 F | SYSTOLIC BLOOD PRESSURE: 128 MMHG | OXYGEN SATURATION: 90 % | DIASTOLIC BLOOD PRESSURE: 81 MMHG | HEIGHT: 67 IN | HEART RATE: 95 BPM | WEIGHT: 217.6 LBS | BODY MASS INDEX: 34.15 KG/M2

## 2023-06-02 DIAGNOSIS — J40 BRONCHITIS: Primary | ICD-10-CM

## 2023-06-02 DIAGNOSIS — J02.9 SORE THROAT: ICD-10-CM

## 2023-06-02 LAB
EXPIRATION DATE: NORMAL
EXPIRATION DATE: NORMAL
FLUAV AG UPPER RESP QL IA.RAPID: NOT DETECTED
FLUBV AG UPPER RESP QL IA.RAPID: NOT DETECTED
INTERNAL CONTROL: NORMAL
INTERNAL CONTROL: NORMAL
Lab: NORMAL
Lab: NORMAL
S PYO AG THROAT QL: NEGATIVE
SARS-COV-2 AG UPPER RESP QL IA.RAPID: NOT DETECTED

## 2023-06-02 PROCEDURE — 87081 CULTURE SCREEN ONLY: CPT | Performed by: NURSE PRACTITIONER

## 2023-06-02 PROCEDURE — 99213 OFFICE O/P EST LOW 20 MIN: CPT | Performed by: NURSE PRACTITIONER

## 2023-06-02 PROCEDURE — 87880 STREP A ASSAY W/OPTIC: CPT | Performed by: NURSE PRACTITIONER

## 2023-06-02 PROCEDURE — 87428 SARSCOV & INF VIR A&B AG IA: CPT | Performed by: NURSE PRACTITIONER

## 2023-06-02 RX ORDER — ALBUTEROL SULFATE 90 UG/1
2 AEROSOL, METERED RESPIRATORY (INHALATION) EVERY 4 HOURS PRN
Qty: 6.7 G | Refills: 3 | Status: SHIPPED | OUTPATIENT
Start: 2023-06-02

## 2023-06-02 RX ORDER — PREDNISONE 20 MG/1
TABLET ORAL
Qty: 19 TABLET | Refills: 0 | Status: SHIPPED | OUTPATIENT
Start: 2023-06-02 | End: 2023-06-12

## 2023-06-02 RX ORDER — DEXTROMETHORPHAN HYDROBROMIDE AND PROMETHAZINE HYDROCHLORIDE 15; 6.25 MG/5ML; MG/5ML
5 SYRUP ORAL 4 TIMES DAILY PRN
Qty: 120 ML | Refills: 0 | Status: SHIPPED | OUTPATIENT
Start: 2023-06-02

## 2023-06-02 NOTE — PROGRESS NOTES
"Dina Lyons presents to [facility] with complaint of  Cough (Sore throat, sneezing onset today )    SUBJECTIVE  Cough  This is a new problem. The current episode started today. The problem has been unchanged. The cough is productive of sputum. Associated symptoms include nasal congestion, rhinorrhea, a sore throat, shortness of breath (\"sometimes\") and wheezing. Pertinent negatives include no ear congestion, ear pain, fever or headaches. Nothing aggravates the symptoms. She has tried nothing for the symptoms. Her past medical history is significant for asthma, environmental allergies and pneumonia.         OBJECTIVE  Vital Signs:   /81 (BP Location: Right arm, Patient Position: Sitting)   Pulse 95   Temp 98.8 °F (37.1 °C) (Oral)   Ht 170.2 cm (67.01\")   Wt 98.7 kg (217 lb 9.6 oz)   SpO2 90% Comment: room air  BMI 34.07 kg/m²       Physical Exam  Vitals reviewed.   Constitutional:       General: She is not in acute distress.     Appearance: Normal appearance. She is not ill-appearing.   HENT:      Head: Normocephalic and atraumatic.      Right Ear: Tympanic membrane and ear canal normal.      Left Ear: Tympanic membrane and ear canal normal.      Nose: Nose normal.      Mouth/Throat:      Mouth: Mucous membranes are moist.      Pharynx: Uvula midline. Posterior oropharyngeal erythema present. No oropharyngeal exudate.      Tonsils: No tonsillar exudate. 1+ on the right. 1+ on the left.   Cardiovascular:      Rate and Rhythm: Normal rate and regular rhythm.      Pulses: Normal pulses.      Heart sounds: Normal heart sounds.   Pulmonary:      Effort: Pulmonary effort is normal.      Breath sounds: Examination of the right-upper field reveals wheezing. Examination of the left-upper field reveals wheezing. Examination of the right-middle field reveals wheezing. Examination of the left-middle field reveals wheezing. Examination of the right-lower field reveals wheezing. " Examination of the left-lower field reveals wheezing. Wheezing (worse on right than left ) present. No rhonchi or rales.   Musculoskeletal:      Cervical back: Neck supple.   Skin:     General: Skin is warm and dry.   Neurological:      General: No focal deficit present.      Mental Status: She is alert and oriented to person, place, and time. Mental status is at baseline.   Psychiatric:         Mood and Affect: Mood normal.         Behavior: Behavior normal.         Judgment: Judgment normal.          Results Review:  The following data was reviewed by Liset Akbar, JAMES [unfilled] 14:52 EDT.    Office Visit on 06/02/2023   Component Date Value Ref Range Status   • SARS Antigen 06/02/2023 Not Detected  Not Detected, Presumptive Negative Final   • Influenza A Antigen NIRANJAN 06/02/2023 Not Detected  Not Detected Final   • Influenza B Antigen NIRANJAN 06/02/2023 Not Detected  Not Detected Final   • Internal Control 06/02/2023 Passed  Passed Final   • Lot Number 06/02/2023 [id number]   Final   • Expiration Date 06/02/2023 12/20/23   Final   • Rapid Strep A Screen 06/02/2023 Negative  Negative, VALID, INVALID, Not Performed Final   • Internal Control 06/02/2023 Passed  Passed Final   • Lot Number 06/02/2023 [id number]   Final   • Expiration Date 06/02/2023 10/31/24   Final         ASSESSMENT AND PLAN:  Diagnoses and all orders for this visit:    1. Bronchitis (Primary)  -     POCT SARS-CoV-2 Antigen NIRANJAN + Flu  -     albuterol sulfate  (90 Base) MCG/ACT inhaler; Inhale 2 puffs Every 4 (Four) Hours As Needed for Wheezing.  Dispense: 6.7 g; Refill: 3  -     predniSONE (DELTASONE) 20 MG tablet; Take 3 tablets by mouth Daily for 3 days, THEN 2 tablets Daily for 3 days, THEN 1 tablet Daily for 3 days, THEN 1/2 tablet Daily for 1 day.  Dispense: 19 tablet; Refill: 0  -     promethazine-dextromethorphan (PROMETHAZINE-DM) 6.25-15 MG/5ML syrup; Take 5 mL by mouth 4 (Four) Times a Day As Needed for Cough.  Dispense: 120 mL; Refill: 0    2.  Sore throat  -     POCT rapid strep A  -     Beta Strep Culture, Throat - , Throat; Future  -     Beta Strep Culture, Throat - Swab, Throat      Patient was negative for flu, COVID, and strep in office today.  Suspect that her current illness is related to bronchitis versus asthma exacerbation.  Her wheezing is significant, she does have history of pneumonia.  She has not been running a fever and her cough is mostly nonproductive.  I did offer to do a chest x-ray to rule out any pneumonia but she declines at this time.  For now she was given albuterol refill, Promethazine DM, and prednisone taper.  If patient is not seeing any improvement over 1 week, she was recommended to follow-up in office.       Follow Up   Return if symptoms worsen or fail to improve. Patient to notify office with any acute concerns or issues.  Patient verbalizes understanding, agrees with plan of care and has no further questions upon discharge.     Patient was given instructions and counseling regarding her condition or for health maintenance advice. Please see specific information pulled into the AVS if appropriate.     Discussed the importance of following up with any needed screening tests/labs/specialist appointments and any requested follow-up recommended by me today. Importance of maintaining follow-up discussed and patient accepts that missed appointments can delay diagnosis and potentially lead to worsening of conditions.    Part of this note may be an electronic transcription/translation of spoken language to printed text using the Dragon Dictation System.

## 2023-06-04 LAB — BACTERIA SPEC AEROBE CULT: NORMAL

## 2023-06-07 DIAGNOSIS — J40 BRONCHITIS: ICD-10-CM

## 2023-06-08 RX ORDER — DEXTROMETHORPHAN HYDROBROMIDE AND PROMETHAZINE HYDROCHLORIDE 15; 6.25 MG/5ML; MG/5ML
5 SYRUP ORAL 4 TIMES DAILY PRN
Qty: 120 ML | Refills: 0 | OUTPATIENT
Start: 2023-06-08

## 2023-06-09 DIAGNOSIS — J40 BRONCHITIS: ICD-10-CM

## 2023-06-09 RX ORDER — DEXTROMETHORPHAN HYDROBROMIDE AND PROMETHAZINE HYDROCHLORIDE 15; 6.25 MG/5ML; MG/5ML
5 SYRUP ORAL 4 TIMES DAILY PRN
Qty: 120 ML | Refills: 0 | OUTPATIENT
Start: 2023-06-09

## 2023-06-09 NOTE — TELEPHONE ENCOUNTER
sarai.    Caller: Dina Lyons    Relationship: Self    Best call back number: [phone]    Requested Prescriptions:   Requested Prescriptions     Pending Prescriptions Disp Refills    promethazine-dextromethorphan (PROMETHAZINE-DM) 6.25-15 MG/5ML syrup 120 mL 0     Sig: Take 5 mL by mouth 4 (Four) Times a Day As Needed for Cough.        Pharmacy where request should be sent: [facility]     Last office visit with prescribing clinician: 6/2/2023   Last telemedicine visit with prescribing clinician: 3/21/2023   Next office visit with prescribing clinician: Visit date not found     Additional details provided by patient: THE PATIENT STATED SHE IS STILL HAVING A COUGH FROM [place]ON[place]ITIS AND NEEDS THIS REFILL SENT ASAP    Does the patient have less than a 3 day supply:  [x] Yes  [] No    Would you like a call back once the refill request has been completed: [x] Yes [] No    If the office needs to give you a call back, can they leave a voicemail: [x] Yes [] No    Eugenie Blancas Rep   06/09/23 13:31 EDT

## 2023-06-12 ENCOUNTER — OFFICE VISIT (OUTPATIENT)
Dept: FAMILY MEDICINE CLINIC | Age: 33
End: 2023-06-12
Payer: COMMERCIAL

## 2023-06-12 VITALS
DIASTOLIC BLOOD PRESSURE: 81 MMHG | HEIGHT: 67 IN | TEMPERATURE: 98.1 F | WEIGHT: 218 LBS | BODY MASS INDEX: 34.21 KG/M2 | HEART RATE: 73 BPM | SYSTOLIC BLOOD PRESSURE: 133 MMHG

## 2023-06-12 DIAGNOSIS — F33.1 MODERATE EPISODE OF RECURRENT MAJOR DEPRESSIVE DISORDER: ICD-10-CM
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
## 2023-06-12 DIAGNOSIS — S70.362A INSECT BITE OF LEFT THIGH, INITIAL ENCOUNTER: Primary | ICD-10-CM

## 2023-06-12 PROCEDURE — 99214 OFFICE O/P EST MOD 30 MIN: CPT | Performed by: NURSE PRACTITIONER

## 2023-06-12 RX ORDER — HYDROXYZINE HYDROCHLORIDE 25 MG/1
25 TABLET, FILM COATED ORAL 3 TIMES DAILY PRN
Qty: 90 TABLET | Refills: 0 | Status: SHIPPED | OUTPATIENT
Start: 2023-06-12

## 2023-06-12 RX ORDER — DOXYCYCLINE HYCLATE 100 MG/1
100 CAPSULE ORAL 2 TIMES DAILY
Qty: 20 CAPSULE | Refills: 0 | Status: SHIPPED | OUTPATIENT
Start: 2023-06-12 | End: 2023-06-15

## 2023-06-12 RX ORDER — VENLAFAXINE HYDROCHLORIDE 37.5 MG/1
37.5 CAPSULE, EXTENDED RELEASE ORAL DAILY
Qty: 90 CAPSULE | Refills: 0 | Status: SHIPPED | OUTPATIENT
Start: 2023-06-12

## 2023-06-12 NOTE — ASSESSMENT & PLAN NOTE
Patient's depression is recurrent and is moderate without psychosis. Their depression is currently active and the condition is newly identified. This will be reassessed  6 weeks  after starting effexor. Discussed med SE. Notify office if symptoms worsen once starting med

## 2023-06-12 NOTE — PROGRESS NOTES
"Dina Lyons presents to [facility] with complaint of  Insect Bite (5 on (L) leg. Pt states they're very painful and makes it hard to walk )    SUBJECTIVE  HPI: Patient is here for insect bites (5 total) on left leg. And she is wanting to discuss depression.     Insect Bite:This is a new problem. The current episode started in the past 7 days. The problem has been gradually worsening. Associated symptoms include a fever and joint swelling (left knee only). Pertinent negatives include no abdominal pain, change in bowel habit, chest pain, chills or myalgias. She has tried nothing for the symptoms.     Depression: This is a recurring problem. She was last on medication when she was in high school She remembers being on Zoloft. She says her Ex boyfriends father recently passed away and this has been difficult for her. She has been dealing with depression \"for a while\" but recent death has worsened her depression. The patient denies thoughts of suicide, self-harm, or homicide. She feels she is to the point of needing medication.     OBJECTIVE  Vital Signs:   /81 (BP Location: Left arm, Patient Position: Sitting)   Pulse 73   Temp 98.1 °F (36.7 °C) (Oral)   Ht 170.2 cm (67.01\")   Wt 98.9 kg (218 lb)   BMI 34.13 kg/m²       Physical Exam  Vitals reviewed.   Constitutional:       General: She is not in acute distress.     Appearance: Normal appearance. She is not ill-appearing.   HENT:      Head: Normocephalic and atraumatic.      Nose: Nose normal.      Mouth/Throat:      Mouth: Mucous membranes are moist.      Pharynx: Oropharynx is clear.   Cardiovascular:      Rate and Rhythm: Normal rate and regular rhythm.      Pulses: Normal pulses.      Heart sounds: Normal heart sounds.   Pulmonary:      Effort: Pulmonary effort is normal.      Breath sounds: Normal breath sounds.   Musculoskeletal:      Cervical back: Neck supple.   Skin:     General: Skin is warm and dry.      Findings: " Wound present.      Comments: 5 erythematous nondraining papules present to left outer leg. Largest lesion close to lateral aspect of left knee has black scab, appears to be old blood. Surrounding tissue is erythematous.    Neurological:      General: No focal deficit present.      Mental Status: She is alert and oriented to person, place, and time. Mental status is at baseline.   Psychiatric:         Mood and Affect: Mood normal.         Behavior: Behavior normal.         Judgment: Judgment normal.        Results Review:  The following data was reviewed by Liset Akbar, JAMES [unfilled] 10:41 EDT.  PHQ-9 Depression Screening  Little interest or pleasure in doing things? 3-->nearly every day   Feeling down, depressed, or hopeless? 3-->nearly every day   Trouble falling or staying asleep, or sleeping too much? 3-->nearly every day   Feeling tired or having little energy? 1-->several days   Poor appetite or overeating? 1-->several days   Feeling bad about yourself - or that you are a failure or have let yourself or your family down? 1-->several days   Trouble concentrating on things, such as reading the newspaper or watching television? 1-->several days   Moving or speaking so slowly that other people could have noticed? Or the opposite - being so fidgety or restless that you have been moving around a lot more than usual? 3-->nearly every day   Thoughts that you would be better off dead, or of hurting yourself in some way? 0-->not at all   PHQ-9 Total Score 16   If you checked off any problems, how difficult have these problems made it for you to do your work, take care of things at home, or get along with other people?           ASSESSMENT AND PLAN:  Diagnoses and all orders for this visit:    1. Insect bite of left thigh, initial encounter (Primary)  Comments:  keep areas clean, apply mupirocin, treating with doxy.  Orders:  -     doxycycline (VIBRAMYCIN) 100 MG capsule; Take 1 capsule by mouth 2 (Two) Times a Day for 10  days.  Dispense: 20 capsule; Refill: 0  -     mupirocin (BACTROBAN) 2 % ointment; Apply 1 application topically to the appropriate area as directed 2 (Two) Times a Day for 10 days.  Dispense: 22 g; Refill: 0    2. Moderate episode of recurrent major depressive disorder  Assessment & Plan:  Patient's depression is recurrent and is moderate without psychosis. Their depression is currently active and the condition is newly identified. This will be reassessed  6 weeks  after starting effexor. Discussed med SE. Notify office if symptoms worsen once starting med    Orders:  -     venlafaxine XR (Effexor XR) 37.5 MG 24 hr capsule; Take 1 capsule by mouth Daily.  Dispense: 90 capsule; Refill: 0  -     hydrOXYzine (ATARAX) 25 MG tablet; Take 1 tablet by mouth 3 (Three) Times a Day As Needed for Anxiety.  Dispense: 90 tablet; Refill: 0            Follow Up   Return in about 6 weeks (around 7/24/2023). Patient to notify office with any acute concerns or issues.  Patient verbalizes understanding, agrees with plan of care and has no further questions upon discharge.     Patient was given instructions and counseling regarding her condition or for health maintenance advice. Please see specific information pulled into the AVS if appropriate.     Discussed the importance of following up with any needed screening tests/labs/specialist appointments and any requested follow-up recommended by me today. Importance of maintaining follow-up discussed and patient accepts that missed appointments can delay diagnosis and potentially lead to worsening of conditions.    Part of this note may be an electronic transcription/translation of spoken language to printed text using the Dragon Dictation System.

## 2023-06-15 ENCOUNTER — OFFICE VISIT (OUTPATIENT)
Dept: FAMILY MEDICINE CLINIC | Age: 33
End: 2023-06-15
Payer: COMMERCIAL

## 2023-06-15 VITALS
HEART RATE: 71 BPM | SYSTOLIC BLOOD PRESSURE: 125 MMHG | BODY MASS INDEX: 35 KG/M2 | DIASTOLIC BLOOD PRESSURE: 84 MMHG | WEIGHT: 223 LBS | HEIGHT: 67 IN

## 2023-06-15 DIAGNOSIS — L02.416 ABSCESS OF LEFT THIGH: Primary | ICD-10-CM

## 2023-06-15 PROCEDURE — 87205 SMEAR GRAM STAIN: CPT | Performed by: NURSE PRACTITIONER

## 2023-06-15 PROCEDURE — 87186 SC STD MICRODIL/AGAR DIL: CPT | Performed by: NURSE PRACTITIONER

## 2023-06-15 PROCEDURE — 99213 OFFICE O/P EST LOW 20 MIN: CPT | Performed by: NURSE PRACTITIONER

## 2023-06-15 PROCEDURE — 87070 CULTURE OTHR SPECIMN AEROBIC: CPT | Performed by: NURSE PRACTITIONER

## 2023-06-15 PROCEDURE — 87147 CULTURE TYPE IMMUNOLOGIC: CPT | Performed by: NURSE PRACTITIONER

## 2023-06-15 RX ORDER — CLINDAMYCIN HYDROCHLORIDE 300 MG/1
300 CAPSULE ORAL 2 TIMES DAILY
Qty: 20 CAPSULE | Refills: 0 | OUTPATIENT
Start: 2023-06-15 | End: 2023-06-18

## 2023-06-15 NOTE — PROGRESS NOTES
"Dina Lyons presents to [facility] with complaint of  Follow-up (Insect bite no better but worse. )    SUBJECTIVE  History of Present Illness     Dina is here for worsening insect bite. She was seen in office 3 days ago for this issue. She was started on doxy and mupirocin oinmnt. The area has been gradually worsening in the past 3 days. Associated symptoms include a fever and joint swelling (left knee only). Fever unmeasured, only occurs at night. Pertinent negatives include no abdominal pain, change in bowel habit, chest pain, chills or myalgias. She does mention she washed areas with peroxide and this has caused some increased pain.        OBJECTIVE  Vital Signs:   /84 (BP Location: Right arm, Patient Position: Sitting)   Pulse 71   Ht 170.2 cm (67.01\")   Wt 101 kg (223 lb)   BMI 34.92 kg/m²       Physical Exam  Vitals reviewed.   Constitutional:       General: She is not in acute distress.     Appearance: Normal appearance. She is not ill-appearing.   HENT:      Head: Normocephalic and atraumatic.      Nose: Nose normal.      Mouth/Throat:      Mouth: Mucous membranes are moist.      Pharynx: Oropharynx is clear.   Cardiovascular:      Rate and Rhythm: Normal rate and regular rhythm.      Pulses: Normal pulses.      Heart sounds: Normal heart sounds.   Pulmonary:      Effort: Pulmonary effort is normal.      Breath sounds: Normal breath sounds.   Musculoskeletal:      Cervical back: Neck supple.   Skin:     General: Skin is warm and dry.      Findings: Abscess present.      Comments: 2 cm abscess present, draining purulent and sanguinous fluid to left thigh. Surrounding tissue erythematous. Small black area present but looks const with dried blood. Smaller draining abscess present to left of larger abscess, also draining purulent fluid. LLE also has 1+ np edema from thigh to ankle.    Neurological:      General: No focal deficit present.      Mental Status: She is " alert and oriented to person, place, and time. Mental status is at baseline.   Psychiatric:         Mood and Affect: Mood normal.         Behavior: Behavior normal.         Judgment: Judgment normal.                 ASSESSMENT AND PLAN:  Diagnoses and all orders for this visit:    1. Abscess of left thigh (Primary)  -     clindamycin (Cleocin) 300 MG capsule; Take 1 capsule by mouth 2 (Two) Times a Day for 10 days.  Dispense: 20 capsule; Refill: 0  -     Wound Culture - Wound, Thigh, Left; Future  -     Wound Culture - Wound, Thigh, Left      Wound culture obtained. Switching from doxy to clinda. Wound care performed today. Wound irrigated with NS via syringe. Mupirocin applied, then telfa dressing. Wrapped in Kerlix and secured with tape. Patient was advised to complete this wound care at home at least once a day and prn for drainage. Wash wounds with dial soap before doing wound care. If patient's fever worsens and is not relieved with antipytretic, erythema extends to large portion of lower or upper leg, chills, feeling faint, weakness, NVD, CP, SOA, or any other untoward symptoms she was encouraged to go to ER. If she does not see improvement in next 3 days, she may need wound care consult.     Follow Up   Return if symptoms worsen or fail to improve, for Next scheduled follow up. Patient to notify office with any acute concerns or issues.  Patient verbalizes understanding, agrees with plan of care and has no further questions upon discharge.     Patient was given instructions and counseling regarding her condition or for health maintenance advice. Please see specific information pulled into the AVS if appropriate.     Discussed the importance of following up with any needed screening tests/labs/specialist appointments and any requested follow-up recommended by me today. Importance of maintaining follow-up discussed and patient accepts that missed appointments can delay diagnosis and potentially lead to  worsening of conditions.    Part of this note may be an electronic transcription/translation of spoken language to printed text using the Dragon Dictation System.

## 2023-06-17 LAB
BACTERIA SPEC AEROBE CULT: ABNORMAL
GRAM STN SPEC: ABNORMAL
GRAM STN SPEC: ABNORMAL

## 2023-06-18 ENCOUNTER — HOSPITAL ENCOUNTER (EMERGENCY)
Facility: HOSPITAL | Age: 33
Discharge: HOME | End: 2023-06-18
Attending: EMERGENCY MEDICINE

## 2023-06-18 VITALS
RESPIRATION RATE: 16 BRPM | HEIGHT: 67 IN | BODY MASS INDEX: 35 KG/M2 | OXYGEN SATURATION: 99 % | DIASTOLIC BLOOD PRESSURE: 81 MMHG | SYSTOLIC BLOOD PRESSURE: 143 MMHG | TEMPERATURE: 98.2 F | HEART RATE: 71 BPM | WEIGHT: 223 LBS

## 2023-06-18 DIAGNOSIS — Z22.322 MRSA (METHICILLIN RESISTANT STAPH AUREUS) CULTURE POSITIVE: ICD-10-CM

## 2023-06-18 DIAGNOSIS — T63.304A SPIDER BITE WOUND, UNDETERMINED INTENT, INITIAL ENCOUNTER: Primary | ICD-10-CM

## 2023-06-18 RX ORDER — DOXYCYCLINE 100 MG/1
100 CAPSULE ORAL 2 TIMES DAILY
Qty: 14 CAPSULE | Refills: 0 | Status: SHIPPED | OUTPATIENT
Start: 2023-06-18

## 2023-06-18 RX ORDER — DOXYCYCLINE 100 MG/1
100 CAPSULE ORAL 2 TIMES DAILY
Qty: 14 CAPSULE | Refills: 0 | Status: SHIPPED | OUTPATIENT
Start: 2023-06-18 | End: 2023-06-18 | Stop reason: SDUPTHER

## 2023-06-18 NOTE — ED NOTES
Patient to ER via car from home for spider bite, patient recently treated but had abnormal labs on my chart so came to ER    Patient reports new drainage

## 2023-06-18 NOTE — Clinical Note
[facility] EMERGENCY DEPARTMENT  [address] [facility] [address]  Phone: [phone]    Dina Lyons was seen and treated in our emergency department on 6/18/2023.  She may return to work on 06/19/2023.         Thank you for choosing [facility].    Manny Corrales RN

## 2023-06-18 NOTE — ED NOTES
Pt to ED d/t a positive MRSA culture from several spider bites that she got 2 weeks ago. Pt states that she has not been able to finish ABX d/t her PCP changing prescriptions. Pt was started on Clindamycin on Friday.

## 2023-06-18 NOTE — ED PROVIDER NOTES
EMERGENCY DEPARTMENT ENCOUNTER    Room Number:  07/07  Date of encounter:  6/18/2023  PCP: Liset Akbar APRN  Historian: Patient, family  Chronic or social conditions impacting care (social determinants of health): Nothing      I used full protective equipment while examining this patient.  This includes face mask, gloves and protective eyewear.  I washed my hands before entering the room and immediately upon leaving the room      HPI:  Chief Complaint: Bug bites, infection  A complete HPI/ROS/PMH/PSH/SH/FH are unobtainable due to: Nothing    Context: Dian Lyons is a 32 y.o. female who presents to the ED c/o approximate 10-day history of suspected spider bites to the left lower extremity.  Patient woke up one morning with 5 inflamed, erythematous papules to the left leg.  She had seen her primary care doctor twice over the past week.  She states the area is overall improved however was concerned about a positive wound culture.  She denies any history of diabetes or fever.    Review of prior external notes (non-ED):   I reviewed primary care office visits from 6/12/2023 and 6/15/2023.  Patient was seen for bug bites and abscesses.  Initially the patient was started on doxycycline on 6/12/2023.  There was not much improvement so the patient was switched to clindamycin on 6/15/2023.  The patient did have wound cultures done at that time.     Review of prior external test results outside of this encounter:  Reviewed wound culture from 6/15/2023.  This showed MRSA resistant to clindamycin.    PAST MEDICAL HISTORY  Active Ambulatory Problems     Diagnosis Date Noted    Left lower quadrant abdominal pain 03/24/2021    Pelvic pain in female 04/05/2021    Bronchitis 03/31/2022    Mild intermittent asthma without complication 04/08/2022    Tobacco abuse, in remission 04/08/2022    Epigastric pain 04/28/2022    Left-sided weakness 07/05/2022    Left sided numbness 07/05/2022    Intractable migraine with aura with status  migrainosus 2022    Abscess of right thigh 12/15/2022    Moderate episode of recurrent major depressive disorder 2023     Resolved Ambulatory Problems     Diagnosis Date Noted    No Resolved Ambulatory Problems     Past Medical History:   Diagnosis Date    Anxiety and depression     Family history of complication of anesthesia     History of migraine     Migraine     Ovarian cyst     Ovarian cyst     Ovarian tumor (benign), left     Pyelonephritis          PAST SURGICAL HISTORY  Past Surgical History:   Procedure Laterality Date    ADENOIDECTOMY      APPENDECTOMY  2006     SECTION      X1    CHOLECYSTECTOMY  2006    DIAGNOSTIC LAPAROSCOPY Left 2021    Procedure: SALPINGO OOPHORECTOMY  LAPAROSCOPY, LYSIS OF ADHESIONS;  Surgeon: Don Proctor MD;  Location: [facility] OR [facility] [place];  Service: Obstetrics/Gynecology;  Laterality: Left;    FLAT FOOT RECONSTRUCTION Bilateral 2006    LAPAROTOMY SALPINGO OOPHORECTOMY Left 2021    OVARY SURGERY Left     TO REMOVE BENIGN OVARIAN TUMOR         FAMILY HISTORY  Family History   Problem Relation Age of Onset    No Known Problems Father     No Known Problems Mother     Malig Hyperthermia Neg Hx          SOCIAL HISTORY  Social History     Socioeconomic History    Marital status: Single   Tobacco Use    Smoking status: Former     Packs/day: 0.50     Years: 15.00     Pack years: 7.50     Types: Cigarettes     Quit date: 2021     Years since quittin.1    Smokeless tobacco: Never    Tobacco comments:     none in the past 4 weeks   Vaping Use    Vaping Use: Every day    Substances: Nicotine, Flavoring    Devices: Pre-filled or refillable cartridge, Refillable tank   Substance and Sexual Activity    Alcohol use: Yes     Comment: occasionally    Drug use: Never    Sexual activity: Yes     Birth control/protection: None         ALLERGIES  Codeine, Contrast dye (echo or unknown ct/mr), Keflex [cephalexin], Levaquin [levofloxacin], Penicillins, and Sulfa  antibiotics        REVIEW OF SYSTEMS  All systems reviewed and negative except for those discussed in HPI.       PHYSICAL EXAM    I have reviewed the triage vital signs and nursing notes.    ED Triage Vitals   Temp Heart Rate Resp BP SpO2   06/18/23 1042 06/18/23 1042 06/18/23 1042 06/18/23 1100 06/18/23 1042   98.2 °F (36.8 °C) 68 16 143/81 98 %      Temp src Heart Rate Source Patient Position BP Location FiO2 (%)   -- -- -- -- --              Physical Exam  GENERAL: Alert, oriented, nontoxic-appearing, not distressed  HENT: head atraumatic, no nuchal rigidity  EYES: no scleral icterus, EOMI  CV: regular rhythm, regular rate, no murmur  RESPIRATORY: normal effort, CTA  ABDOMEN: soft, nontender  MUSCULOSKELETAL: Multiple papules in the state of healing over the left leg.  There is 1 scabbed over area lateral to the left knee with some mild purulent drainage.  No significant fluctuance.  No surrounding erythema or lymphangitis.  The left knee appears normal without any warmth.  NEURO: alert, moves all extremities, follows commands  SKIN: warm, dry    MEDICATIONS GIVEN IN ER    Medications - No data to display    Procedure:    The scab to the left knee was deroofed.  Mild purulent discharge expressed.  Area was irrigated.  Dressed.  No anesthesia.    ADDITIONAL ORDERS CONSIDERED BUT NOT ORDERED:  Nothing      PROGRESS, DATA ANALYSIS, CONSULTS, AND MEDICAL DECISION MAKING    All labs have been independently interpreted by myself.  All radiology studies have been independently interpreted by myself and discussed with radiologist dictating the report.   EKG's independently interpreted by myself.  Discussion below represents my analysis of pertinent findings related to patient's condition, differential diagnosis, treatment plan and final disposition.    I have discussed case with Dr. Mcdermott, emergency room physician.  He has performed his own bedside examination and agrees with treatment plan.    ED Course as of 06/18/23  1429   [place] Jun 18, 2023   8714 Patient presents with 2-week history of suspected spider bites to the left lower extremity.  Plan for deroofing of the abscess to the left knee.  We will switch her antibiotics based on the sensitivity. [EE]      ED Course User Index  [EE] Pasquale Martinez PA       AS OF 14:29 EDT VITALS:    BP - 143/81  HR - 71  TEMP - 98.2 °F (36.8 °C)  O2 SATS - 99%        DIAGNOSIS  Final diagnoses:   Spider bite wound, undetermined intent, initial encounter   MRSA (methicillin resistant staph aureus) culture positive         DISPOSITION  Discharged      Dictated utilizing Dragon dictation     Pasquale Martinez PA  06/18/23 1424

## 2023-06-19 ENCOUNTER — TELEPHONE (OUTPATIENT)
Dept: FAMILY MEDICINE CLINIC | Age: 33
End: 2023-06-19
Payer: COMMERCIAL

## 2023-06-19 RX ORDER — ONDANSETRON 4 MG/1
4 TABLET, ORALLY DISINTEGRATING ORAL EVERY 6 HOURS PRN
Qty: 14 TABLET | Refills: 0 | Status: SHIPPED | OUTPATIENT
Start: 2023-06-19

## 2023-06-19 NOTE — TELEPHONE ENCOUNTER
Zofran sent to [facility]. Have her monitor tongue numbness. If her tongue begins to swell than this may be allergic reaction. If she feels weak or presyncopal, temp 104 not coming down with tylenol, low BP, high HR she needs to go to ER as these are s/s of sepsis.

## 2023-06-19 NOTE — TELEPHONE ENCOUNTER
Patient states her tongue is numb and back of her head feels hot along with a headache. Patient would like zofran it that is ok

## 2023-06-19 NOTE — TELEPHONE ENCOUNTER
Esperanza called to report that Dina started on doxycyline 100mg BID  yesterday for a spier bite. Was seen in the ER by Pasquale RAMIREZ. She is having  a lot of nausea and some vomiting form the medicine. Mom was not sure if she was allergic merrick it.  No other symptoms please advise

## 2023-06-19 NOTE — TELEPHONE ENCOUNTER
Nausea and vomiting are not really considered allergic reactions. Rec taking with food/crackers/sprite. I can also send in zofran if she likes.

## 2023-08-21 ENCOUNTER — OFFICE VISIT (OUTPATIENT)
Dept: FAMILY MEDICINE CLINIC | Age: 33
End: 2023-08-21
Payer: COMMERCIAL

## 2023-08-21 VITALS
HEIGHT: 67 IN | SYSTOLIC BLOOD PRESSURE: 122 MMHG | WEIGHT: 227.8 LBS | OXYGEN SATURATION: 97 % | HEART RATE: 77 BPM | DIASTOLIC BLOOD PRESSURE: 77 MMHG | BODY MASS INDEX: 35.75 KG/M2 | TEMPERATURE: 98.3 F

## 2023-08-21 DIAGNOSIS — R35.0 FREQUENT URINATION: ICD-10-CM

## 2023-08-21 DIAGNOSIS — N39.0 ACUTE UTI: Primary | ICD-10-CM

## 2023-08-21 LAB
BACTERIA UR QL AUTO: ABNORMAL /HPF
BILIRUB UR QL STRIP: NEGATIVE
CLARITY UR: ABNORMAL
COLOR UR: YELLOW
GLUCOSE UR STRIP-MCNC: NEGATIVE MG/DL
HGB UR QL STRIP.AUTO: NEGATIVE
KETONES UR QL STRIP: NEGATIVE
LEUKOCYTE ESTERASE UR QL STRIP.AUTO: ABNORMAL
MUCOUS THREADS URNS QL MICRO: ABNORMAL /HPF
NITRITE UR QL STRIP: NEGATIVE
PH UR STRIP.AUTO: <=5 [PH] (ref 5–8)
PROT UR QL STRIP: NEGATIVE
RBC # UR STRIP: ABNORMAL /HPF
REF LAB TEST METHOD: ABNORMAL
SP GR UR STRIP: >=1.03 (ref 1–1.03)
SQUAMOUS #/AREA URNS HPF: ABNORMAL /HPF
UROBILINOGEN UR QL STRIP: ABNORMAL
WBC # UR STRIP: ABNORMAL /HPF

## 2023-08-21 PROCEDURE — 81001 URINALYSIS AUTO W/SCOPE: CPT | Performed by: PHYSICIAN ASSISTANT

## 2023-08-21 PROCEDURE — 87086 URINE CULTURE/COLONY COUNT: CPT | Performed by: PHYSICIAN ASSISTANT

## 2023-08-21 PROCEDURE — 99213 OFFICE O/P EST LOW 20 MIN: CPT | Performed by: PHYSICIAN ASSISTANT

## 2023-08-21 RX ORDER — NITROFURANTOIN 25; 75 MG/1; MG/1
100 CAPSULE ORAL 2 TIMES DAILY
Qty: 10 CAPSULE | Refills: 0 | Status: SHIPPED | OUTPATIENT
Start: 2023-08-21 | End: 2023-08-26

## 2023-08-21 NOTE — LETTER
August 21, 2023     Patient: Dina Lyons   YOB: 1990   Date of Visit: 8/21/2023       To Whom It May Concern:    It is my medical opinion that Dina Lyons may return to work ib 8/22/23.         Sincerely,          Marissa Chairez PA-C    CC: No Recipients

## 2023-08-21 NOTE — PROGRESS NOTES
Diagnoses and all orders for this visit:    1. Acute UTI (Primary)  -     nitrofurantoin, macrocrystal-monohydrate, (Macrobid) 100 MG capsule; Take 1 capsule by mouth 2 (Two) Times a Day for 5 days.  Dispense: 10 capsule; Refill: 0  -     Urine Culture - Urine, Urine, Clean Catch; Future    2. Frequent urination  -     Urinalysis With Microscopic - Urine, Clean Catch            Subjective     CHIEF COMPLAINT    Chief Complaint   Patient presents with    Back Pain     Pt c/o urinary odor, low back pain, pain after urination, frequent urination, chills. Onset about a week ago.              History of Present Illness  This is a 32-year-old female presenting the clinic complaining of low back pain, urinary frequency, and lower abdominal pain.  Her symptoms began with chills and low back pain about a week ago, but worsened significantly yesterday.  She also reports a foul smell to her urine.          Review of Systems   Constitutional:  Positive for chills. Negative for fever.   Gastrointestinal:  Positive for abdominal pain (suprapubic), diarrhea and nausea. Negative for vomiting.   Genitourinary:  Positive for flank pain and frequency. Negative for dysuria, hematuria, urgency, vaginal bleeding and vaginal discharge.   Musculoskeletal:  Negative for back pain.          Past Medical History:   Diagnosis Date    Anxiety and depression     Family history of complication of anesthesia     MOTHER HAS SEVERE NAUSEA    History of migraine     Migraine     Ovarian cyst     Ovarian cyst     RIGHT    Ovarian tumor (benign), left     Pyelonephritis             Past Surgical History:   Procedure Laterality Date    ADENOIDECTOMY      APPENDECTOMY       SECTION      X1    CHOLECYSTECTOMY      DIAGNOSTIC LAPAROSCOPY Left 2021    Procedure: SALPINGO OOPHORECTOMY  LAPAROSCOPY, LYSIS OF ADHESIONS;  Surgeon: Don Proctor MD;  Location: [facility] OR [facility] [place];  Service: Obstetrics/Gynecology;  Laterality: Left;    FLAT  FOOT RECONSTRUCTION Bilateral 2006    LAPAROTOMY SALPINGO OOPHORECTOMY Left 2021    OVARY SURGERY Left     TO REMOVE BENIGN OVARIAN TUMOR            Family History   Problem Relation Age of Onset    No Known Problems Father     No Known Problems Mother     Malmicki Hyperthermia Neg Hx             Social History     Socioeconomic History    Marital status: Single   Tobacco Use    Smoking status: Former     Packs/day: 0.50     Years: 15.00     Pack years: 7.50     Types: Cigarettes     Quit date: 2021     Years since quittin.3    Smokeless tobacco: Never    Tobacco comments:     none in the past 4 weeks   Vaping Use    Vaping Use: Every day    Substances: Nicotine, Flavoring    Devices: Pre-filled or refillable cartridge, Refillable tank   Substance and Sexual Activity    Alcohol use: Yes     Comment: occasionally    Drug use: Never    Sexual activity: Yes     Birth control/protection: None            Allergies   Allergen Reactions    Codeine Nausea And Vomiting    Contrast Dye (Echo Or Unknown Ct/Mr) Hives     Patient has tolerated contrast dye when pretreated with benadryl    Keflex [Cephalexin] Hives    Levaquin [Levofloxacin] Swelling    Penicillins Hives    Sulfa Antibiotics Hives            Current Outpatient Medications on File Prior to Visit   Medication Sig Dispense Refill    acetaminophen (TYLENOL) 500 MG tablet Take 1-2 tablets by mouth Every 6 (Six) Hours As Needed for Mild Pain.      albuterol sulfate  (90 Base) MCG/ACT inhaler Inhale 2 puffs Every 4 (Four) Hours As Needed for Wheezing. 6.7 g 3    hydrOXYzine (ATARAX) 25 MG tablet Take 1 tablet by mouth 3 (Three) Times a Day As Needed for Anxiety. 90 tablet 0    rizatriptan (Maxalt) 10 MG tablet Take 1 tablet by mouth 1 (One) Time As Needed for Migraine. May repeat in 2 hours if needed 12 tablet 3    topiramate (TOPAMAX) 50 MG tablet Take 1 tablet by mouth every night at bedtime for 2 weeks, then increase to take 1 tablet twice  "daily 60 tablet 3    venlafaxine XR (Effexor XR) 37.5 MG 24 hr capsule Take 1 capsule by mouth Daily. 90 capsule 0    [DISCONTINUED] doxycycline (MONODOX) 100 MG capsule Take 1 capsule by mouth 2 (Two) Times a Day. 14 capsule 0    [DISCONTINUED] ondansetron ODT (ZOFRAN-ODT) 4 MG disintegrating tablet Place 1 tablet on the tongue Every 6 (Six) Hours As Needed for Nausea or Vomiting. 14 tablet 0     No current facility-administered medications on file prior to visit.            /77 (BP Location: Right arm, Patient Position: Sitting)   Pulse 77   Temp 98.3 øF (36.8 øC) (Oral)   Ht 170.2 cm (67\")   Wt 103 kg (227 lb 12.8 oz)   SpO2 97% Comment: room air  BMI 35.68 kg/mý          Objective     Physical Exam  Vitals and nursing note reviewed.   Constitutional:       Appearance: Normal appearance.   Cardiovascular:      Rate and Rhythm: Normal rate and regular rhythm.      Heart sounds: Normal heart sounds.   Pulmonary:      Effort: Pulmonary effort is normal.      Breath sounds: Normal breath sounds.   Abdominal:      General: There is no distension.      Palpations: Abdomen is soft.      Tenderness: There is no abdominal tenderness. There is no right CVA tenderness, left CVA tenderness or guarding.   Skin:     General: Skin is warm and dry.      Findings: No rash.   Neurological:      Mental Status: She is alert and oriented to person, place, and time.   Psychiatric:         Mood and Affect: Mood normal.         Behavior: Behavior normal.            Procedures                    Lab Results (last 24 hours)       Procedure Component Value Units Date/Time    Urinalysis With Microscopic - Urine, Clean Catch [[id number]]  (Abnormal) Collected: 08/21/23 1056    Specimen: Urine, Clean Catch Updated: 08/21/23 1214    Narrative:      The following orders were created for panel order Urinalysis With Microscopic - Urine, Clean Catch.  Procedure                               Abnormality         Status                   "   ---------                               -----------         ------                     Urinalysis without micro...[[id number]]  Abnormal            Final result               Urinalysis, Microscopic ...[[id number]]  Abnormal            Final result                 Please view results for these tests on the individual orders.    Urinalysis without microscopic (no culture) - Urine, Clean Catch [[id number]]  (Abnormal) Collected: 08/21/23 1056    Specimen: Urine, Clean Catch Updated: 08/21/23 1139     Color, UA Yellow     Appearance, UA Slightly Cloudy     pH, UA <=5.0     Specific Gravity, UA >=1.030     Glucose, UA Negative     Ketones, UA Negative     Bilirubin, UA Negative     Blood, UA Negative     Protein, UA Negative     Leuk Esterase, UA Small (1+)     Nitrite, UA Negative     Urobilinogen, UA 0.2 E.U./dL    Urinalysis, Microscopic Only - Urine, Clean Catch [[id number]]  (Abnormal) Collected: 08/21/23 1056    Specimen: Urine, Clean Catch Updated: 08/21/23 1214     RBC, UA 0-2 /HPF      WBC, UA 13-20 /HPF      Bacteria, UA 2+ /HPF      Squamous Epithelial Cells, UA 13-20 /HPF      Mucus, UA Small/1+ /HPF      Methodology Manual Light Microscopy                  No Radiology Exams Resulted Within Past 24 Hours                    Diagnoses and all orders for this visit:    1. Acute UTI (Primary)  -     nitrofurantoin, macrocrystal-monohydrate, (Macrobid) 100 MG capsule; Take 1 capsule by mouth 2 (Two) Times a Day for 5 days.  Dispense: 10 capsule; Refill: 0  -     Urine Culture - Urine, Urine, Clean Catch; Future    2. Frequent urination  -     Urinalysis With Microscopic - Urine, Clean Catch             Additional Instructions for the Follow-ups that You Need to Schedule       Urine Culture - Urine, Urine, Clean Catch    Aug 21, 2023 (Approximate)      Release to patient: Routine Release                          FOR FULL DISCHARGE INSTRUCTIONS/COMMENTS/HANDOUTS please see the   AVS

## 2023-08-22 LAB — BACTERIA SPEC AEROBE CULT: NO GROWTH

## 2023-08-23 ENCOUNTER — TELEPHONE (OUTPATIENT)
Dept: FAMILY MEDICINE CLINIC | Age: 33
End: 2023-08-23

## 2023-08-23 ENCOUNTER — LAB (OUTPATIENT)
Dept: LAB | Facility: HOSPITAL | Age: 33
End: 2023-08-23
Payer: COMMERCIAL

## 2023-08-23 ENCOUNTER — OFFICE VISIT (OUTPATIENT)
Dept: FAMILY MEDICINE CLINIC | Age: 33
End: 2023-08-23
Payer: COMMERCIAL

## 2023-08-23 VITALS
BODY MASS INDEX: 34.53 KG/M2 | TEMPERATURE: 98.4 F | OXYGEN SATURATION: 97 % | DIASTOLIC BLOOD PRESSURE: 74 MMHG | HEART RATE: 62 BPM | SYSTOLIC BLOOD PRESSURE: 124 MMHG | WEIGHT: 220 LBS | HEIGHT: 67 IN

## 2023-08-23 DIAGNOSIS — Z11.3 SCREENING FOR STD (SEXUALLY TRANSMITTED DISEASE): ICD-10-CM

## 2023-08-23 DIAGNOSIS — R19.7 DIARRHEA, UNSPECIFIED TYPE: ICD-10-CM

## 2023-08-23 DIAGNOSIS — R68.83 CHILLS: ICD-10-CM

## 2023-08-23 DIAGNOSIS — R10.9 FLANK PAIN: Primary | ICD-10-CM

## 2023-08-23 DIAGNOSIS — R11.0 NAUSEA: ICD-10-CM

## 2023-08-23 DIAGNOSIS — A59.9 TRICHOMONAS INFECTION: ICD-10-CM

## 2023-08-23 DIAGNOSIS — R10.30 LOWER ABDOMINAL PAIN: ICD-10-CM

## 2023-08-23 LAB
B-HCG UR QL: NEGATIVE
BACTERIA UR QL AUTO: ABNORMAL /HPF
BILIRUB UR QL STRIP: NEGATIVE
C TRACH RRNA CVX QL NAA+PROBE: NOT DETECTED
CLARITY UR: ABNORMAL
COLOR UR: YELLOW
EXPIRATION DATE: NORMAL
FLUAV AG UPPER RESP QL IA.RAPID: NOT DETECTED
FLUBV AG UPPER RESP QL IA.RAPID: NOT DETECTED
GLUCOSE UR STRIP-MCNC: NEGATIVE MG/DL
HAV IGM SERPL QL IA: NORMAL
HBV CORE IGM SERPL QL IA: NORMAL
HBV SURFACE AG SERPL QL IA: NORMAL
HCV AB SER DONR QL: NORMAL
HGB UR QL STRIP.AUTO: NEGATIVE
HIV 1+2 AB+HIV1 P24 AG SERPL QL IA: NORMAL
INTERNAL CONTROL: NORMAL
KETONES UR QL STRIP: NEGATIVE
LEUKOCYTE ESTERASE UR QL STRIP.AUTO: ABNORMAL
Lab: NORMAL
N GONORRHOEA RRNA SPEC QL NAA+PROBE: NOT DETECTED
NITRITE UR QL STRIP: NEGATIVE
PH UR STRIP.AUTO: 5.5 [PH] (ref 5–8)
PROT UR QL STRIP: NEGATIVE
RBC # UR STRIP: ABNORMAL /HPF
REF LAB TEST METHOD: ABNORMAL
SARS-COV-2 AG UPPER RESP QL IA.RAPID: NOT DETECTED
SP GR UR STRIP: 1.02 (ref 1–1.03)
SQUAMOUS #/AREA URNS HPF: ABNORMAL /HPF
TRICHOMONAS #/AREA URNS HPF: ABNORMAL /HPF
UROBILINOGEN UR QL STRIP: ABNORMAL
WBC # UR STRIP: ABNORMAL /HPF

## 2023-08-23 PROCEDURE — 86592 SYPHILIS TEST NON-TREP QUAL: CPT

## 2023-08-23 PROCEDURE — G0432 EIA HIV-1/HIV-2 SCREEN: HCPCS

## 2023-08-23 PROCEDURE — 81001 URINALYSIS AUTO W/SCOPE: CPT

## 2023-08-23 PROCEDURE — 99214 OFFICE O/P EST MOD 30 MIN: CPT

## 2023-08-23 PROCEDURE — 87591 N.GONORRHOEAE DNA AMP PROB: CPT

## 2023-08-23 PROCEDURE — 87491 CHLMYD TRACH DNA AMP PROBE: CPT

## 2023-08-23 PROCEDURE — 36415 COLL VENOUS BLD VENIPUNCTURE: CPT

## 2023-08-23 PROCEDURE — 87661 TRICHOMONAS VAGINALIS AMPLIF: CPT

## 2023-08-23 PROCEDURE — 81025 URINE PREGNANCY TEST: CPT

## 2023-08-23 PROCEDURE — 80074 ACUTE HEPATITIS PANEL: CPT

## 2023-08-23 PROCEDURE — 87428 SARSCOV & INF VIR A&B AG IA: CPT

## 2023-08-23 PROCEDURE — 87086 URINE CULTURE/COLONY COUNT: CPT

## 2023-08-23 NOTE — TELEPHONE ENCOUNTER
Spoke with pt and inf she could stop abx since urine culture showed no growth. Pcp out of office so no need for abx. Pt states she is very nauseated and having bad back pain, adv pt could see acute provider and appt made for today.

## 2023-08-23 NOTE — PROGRESS NOTES
Subjective     CHIEF COMPLAINT    Chief Complaint   Patient presents with    Back Pain    Nausea     diarrhea     History of Present Illness  Patient is a 32-year-old female who presents to the clinic today with complaints of back pain, nausea and diarrhea.  She was seen in our office on 8- and started on macrobid for an acute UTI.  Her urine culture from that visit resulted today and showed no bacterial growth.  She was informed to stop the antibiotics but was concerned because her symptoms had significantly worsened.  She states that she normally gets a kidney infection about once a year where she requires hospitalization with IV antibiotics.  She did have an odor to her urine but she reports that this has resolved.  She is also complaining of chills but no fever.  She states she has a lower abdominal pain when she pees and when she presses on her abdomen.  He is having diarrhea at least 5 times a day.  Denies any blood in her stool or urine.  She has had a cholecystectomy and appendectomy.  She did have 1 kidney stone in the past which she passed on her own.  She denies being sexually active.      Review of Systems   Constitutional:  Positive for chills. Negative for fever.   Gastrointestinal:  Positive for abdominal pain, diarrhea and nausea. Negative for blood in stool and vomiting.   Genitourinary:  Positive for frequency and urgency. Negative for hematuria.   Musculoskeletal:  Positive for back pain.     Allergies   Allergen Reactions    Codeine Nausea And Vomiting    Contrast Dye (Echo Or Unknown Ct/Mr) Hives     Patient has tolerated contrast dye when pretreated with benadryl    Keflex [Cephalexin] Hives    Levaquin [Levofloxacin] Swelling    Penicillins Hives    Sulfa Antibiotics Hives       Current Outpatient Medications on File Prior to Visit   Medication Sig Dispense Refill    acetaminophen (TYLENOL) 500 MG tablet Take 1-2 tablets by mouth Every 6 (Six) Hours As Needed for Mild Pain.       "albuterol sulfate  (90 Base) MCG/ACT inhaler Inhale 2 puffs Every 4 (Four) Hours As Needed for Wheezing. 6.7 g 3    hydrOXYzine (ATARAX) 25 MG tablet Take 1 tablet by mouth 3 (Three) Times a Day As Needed for Anxiety. 90 tablet 0    nitrofurantoin, macrocrystal-monohydrate, (Macrobid) 100 MG capsule Take 1 capsule by mouth 2 (Two) Times a Day for 5 days. 10 capsule 0    rizatriptan (Maxalt) 10 MG tablet Take 1 tablet by mouth 1 (One) Time As Needed for Migraine. May repeat in 2 hours if needed 12 tablet 3    topiramate (TOPAMAX) 50 MG tablet Take 1 tablet by mouth every night at bedtime for 2 weeks, then increase to take 1 tablet twice daily 60 tablet 3    venlafaxine XR (Effexor XR) 37.5 MG 24 hr capsule Take 1 capsule by mouth Daily. 90 capsule 0     No current facility-administered medications on file prior to visit.     /74 (BP Location: Left arm, Patient Position: Sitting)   Pulse 62   Temp 98.4 øF (36.9 øC) (Oral)   Ht 170.2 cm (67.01\")   Wt 99.8 kg (220 lb)   SpO2 97%   BMI 34.45 kg/mý     Objective     Physical Exam  Vitals and nursing note reviewed.   Constitutional:       General: She is not in acute distress.     Appearance: Normal appearance. She is ill-appearing (appears uncomfortable).   HENT:      Head: Normocephalic.      Nose: Nose normal.      Mouth/Throat:      Lips: Pink.   Eyes:      Extraocular Movements: Extraocular movements intact.   Cardiovascular:      Rate and Rhythm: Normal rate and regular rhythm.      Heart sounds: Normal heart sounds. No murmur heard.  Pulmonary:      Effort: Pulmonary effort is normal. No accessory muscle usage or respiratory distress.      Breath sounds: Normal breath sounds. No wheezing or rhonchi.   Abdominal:      General: Bowel sounds are normal. There is no distension.      Palpations: Abdomen is soft.      Tenderness: There is abdominal tenderness in the right lower quadrant, suprapubic area and left lower quadrant. There is right CVA " tenderness and left CVA tenderness. There is no guarding or rebound.   Musculoskeletal:      Cervical back: Normal range of motion. No rigidity. Normal range of motion.   Lymphadenopathy:      Cervical: No cervical adenopathy.   Neurological:      General: No focal deficit present.      Mental Status: She is alert and oriented to person, place, and time.   Psychiatric:         Mood and Affect: Mood and affect normal.         Behavior: Behavior normal.        Lab Results (last 24 hours)       Procedure Component Value Units Date/Time    Urinalysis With Microscopic - Urine, Clean Catch [[id number]]  (Abnormal) Collected: 08/23/23 1132    Specimen: Urine, Clean Catch Updated: 08/23/23 1148    Narrative:      The following orders were created for panel order Urinalysis With Microscopic - Urine, Clean Catch.  Procedure                               Abnormality         Status                     ---------                               -----------         ------                     Urinalysis without micro...[[id number]]  Abnormal            Final result               Urinalysis, Microscopic ...[[id number]]  Abnormal            Final result                 Please view results for these tests on the individual orders.    Urine Culture - Urine, Urine, Clean Catch [[id number]] Collected: 08/23/23 1132    Specimen: Urine, Clean Catch Updated: 08/23/23 1134    Urinalysis without microscopic (no culture) - Urine, Clean Catch [[id number]]  (Abnormal) Collected: 08/23/23 1132    Specimen: Urine, Clean Catch Updated: 08/23/23 1140     Color, UA Yellow     Appearance, UA Slightly Cloudy     pH, UA 5.5     Specific Gravity, UA 1.025     Glucose, UA Negative     Ketones, UA Negative     Bilirubin, UA Negative     Blood, UA Negative     Protein, UA Negative     Leuk Esterase, UA Trace     Nitrite, UA Negative     Urobilinogen, UA 0.2 E.U./dL    Urinalysis, Microscopic Only - Urine, Clean Catch [[id number]]  (Abnormal) Collected: 08/23/23  1132    Specimen: Urine, Clean Catch Updated: 08/23/23 1148     RBC, UA None Seen /HPF      WBC, UA 3-5 /HPF      Bacteria, UA Trace /HPF      Squamous Epithelial Cells, UA 3-6 /HPF      Trichomonas, UA Small/1+ /HPF      Methodology Manual Light Microscopy    Pregnancy, Urine - Urine, Clean Catch [[id number]]  (Normal) Collected: 08/23/23 1132    Specimen: Urine, Clean Catch Updated: 08/23/23 1143     HCG, Urine QL Negative    Narrative:      Sensitive immunoassays may demonstrate false positive results  with specimens containing heterophilic antibodies. Assays may  also exhibit false-positive or false-negative results with  specimens containing human anti-mouse antibodies. These   specimens may come from patients receving preparations of  mouse monoclonal antibodies for diagnosis or therapy or having  been exposed to mice. If the qualitative interpretation is  inconsistent with the clinical evaluation, results should be   confirmed by an alternate hCG method, ie. quantitative hCG.  As with all urine pregnancy test, this test does not reliably  detect hCG degradation products, including free-beta hCG and  beta core fragments.    POCT SARS-CoV-2 Antigen NIRANJAN + Flu [[id number]] Collected: 08/23/23 1202    Specimen: Swab Updated: 08/23/23 1203     SARS Antigen Not Detected     Influenza A Antigen NIRANJAN Not Detected     Influenza B Antigen NIRANJAN Not Detected     Internal Control Passed     Lot Number [id number]     Expiration Date 12/22/2023    CT/NG, TV, PCR - Urine, Urine, Random Void [[id number]] Collected: 08/23/23 1217    Specimen: Urine, Random Void Updated: 08/23/23 1221    Narrative:      The following orders were created for panel order CT/NG, TV, PCR - Urine, Urine, Random Void.  Procedure                               Abnormality         Status                     ---------                               -----------         ------                     Chlamydia trachomatis, N...[[id number]]                      In process                  Trichomonas vaginalis, P...[[id number]]                      In process                   Please view results for these tests on the individual orders.    Chlamydia trachomatis, Neisseria gonorrhoeae, PCR - Urine, Urine, Random Void [[id number]] Collected: 08/23/23 1217    Specimen: Urine, Random Void Updated: 08/23/23 1221    Trichomonas vaginalis, PCR - Urine, Urine, Random Void [[id number]] Collected: 08/23/23 1217    Specimen: Urine, Random Void Updated: 08/23/23 1221    Hepatitis panel, acute [[id number]] Collected: 08/23/23 1224    Specimen: Blood Updated: 08/23/23 1224    HIV-1/O/2 ANTIGEN/ANTIBODY, 4TH GENERATION [[id number]] Collected: 08/23/23 1224    Specimen: Blood Updated: 08/23/23 1224    RPR [[id number]] Collected: 08/23/23 1224    Specimen: Blood Updated: 08/23/23 1224    Urinalysis, Microscopic Only - [[id number]]  (Abnormal) Collected: 08/23/23 1311     Updated: 08/23/23 1408    Urinalysis, Reflex Microscopic and Culture If Indicated [[id number]]  (Abnormal) Collected: 08/23/23 1311     Updated: 08/23/23 1408    LIPASE [[id number]]  (Normal) Collected: 08/23/23 1404    Specimen: Blood Updated: 08/23/23 1452     Lipase 27 U/L     Narrative:      On 5/31/23, reference ranges have changed    COMPREHENSIVE METABOLIC PANEL [[id number]]  (Abnormal) Collected: 08/23/23 1404     Updated: 08/23/23 1452    CBC with Auto Diff [[id number]]  (Abnormal) Collected: 08/23/23 1404     Updated: 08/23/23 1452    Wet Prep, Genital - , [[id number]]  (Abnormal) Collected: 08/23/23 1454     Updated: 08/23/23 1539             Diagnoses and all orders for this visit:    1. Flank pain (Primary)  -     Urinalysis With Microscopic - Urine, Clean Catch; Future  -     Urine Culture - Urine, Urine, Clean Catch; Future  -     Urinalysis With Microscopic - Urine, Clean Catch  -     Urine Culture - Urine, Urine, Clean Catch    2. Lower abdominal pain  -     Urinalysis With Microscopic - Urine, Clean Catch; Future  -     Urine  Culture - Urine, Urine, Clean Catch; Future  -     Urinalysis With Microscopic - Urine, Clean Catch  -     Urine Culture - Urine, Urine, Clean Catch  -     Cancel: POCT pregnancy, urine  -     Pregnancy, Urine - Urine, Clean Catch; Future  -     Pregnancy, Urine - Urine, Clean Catch    3. Diarrhea, unspecified type  -     POCT SARS-CoV-2 Antigen NIRANJAN + Flu    4. Nausea  -     Cancel: POCT pregnancy, urine  -     Pregnancy, Urine - Urine, Clean Catch; Future  -     Pregnancy, Urine - Urine, Clean Catch  -     POCT SARS-CoV-2 Antigen NIRANJAN + Flu    5. Chills  -     POCT SARS-CoV-2 Antigen NIRANJAN + Flu    6. Screening for STD (sexually transmitted disease)  -     Hepatitis panel, acute; Future  -     HIV-1/O/2 ANTIGEN/ANTIBODY, 4TH GENERATION; Future  -     RPR; Future  -     CT/NG, TV, PCR - Urine, Urine, Random Void; Future  -     CT/NG, TV, PCR - Urine, Urine, Random Void    7. Trichomonas infection      Patient is negative for COVID and flu on rapid testing today.  Pregnancy test negative.  Her urine is slightly suspicious for UTI given trace amount of bacteria however it is contaminated with squamous cells.  There is note of trichomonas on her urine sample.  Patient states that her ex was unfaithful to her about a year ago which caused them to break up.  We discussed screening for other STDs and patient would like to proceed with this at this time.  STD testing ordered.  Patient educated on timeline to expect results.  Patient was educated that for any sexual partners recently she needs to inform them of her positive trichomonas testing so they can be treated.  She was also educated to avoid sexual intercourse until the remainder of her testing has resulted.    Patient's abdomen appears acute on exam today and I am concerned that her symptoms have significantly worsened recently.  For this reason, I would recommend that she proceed to the ER for further work-up and evaluation.  Patient is agreeable to plan and states  that she will proceed to [facility] for further work-up.  She declines EMS and is aware of risks.  I did advise her to inform the ER that she was positive for trichomonas today.  I can send in the treatment for this however I did advise her that we will check in regarding what the ER has prescribed her.  I do not necessarily think her trichomonas infection is related to her abdominal pain or other symptoms and she is also having significant flank pain which I feel needs further work-up.  Patient to proceed directly to the ER for further work-up.      Addendum 8- 7:17 PM: Reviewed ER documentation, ordered prescription includes metronidazole for treatment of trichomonas.  We will contact patient tomorrow to confirm she has received this medication treatment.    Follow up:  No follow-ups on file.  Patient was given instructions and counseling regarding her condition or for health maintenance advice. Please see specific information pulled into the AVS if appropriate.

## 2023-08-23 NOTE — TELEPHONE ENCOUNTER
Caller: Dina Loyns    Relationship: Self  Best call back number: [phone]    What medication are you requesting: DIFFERENT ANTIBIOTIC    What are your current symptoms: N/A    How long have you been experiencing symptoms: N/A    Have you had these symptoms before:    [x] Yes  [] No    Have you been treated for these symptoms before:   [x] Yes  [] No    If a prescription is needed, what is your preferred pharmacy and phone number: [facility] - [place]     Additional notes:PATIENT WAS SEEN FOR A UTI. HER SYMPTOMS ARE WORSE NOW NOT BETTER. SHE IS ALSO HAVING DIARRHEA AND BACK PAIN. PLEASE SEND DIFFERENT PRESCRIPTION TO PHARMACY ASAP.    PATIENT ALSO NEEDS A WORK NOTE FOR YESTERDAY AND TODAY. EVERY TIME SHE MOVES SHE FEELS LIKE VOMITING. PLEASE CALL PATIENT WHEN THIS NOTE IS READY FOR PICKUP AT THE .

## 2023-08-24 LAB — BACTERIA SPEC AEROBE CULT: NO GROWTH

## 2023-08-25 LAB — RPR SER QL: NON REACTIVE

## 2023-08-26 LAB — T VAGINALIS RRNA SPEC QL NAA+PROBE: POSITIVE

## 2023-08-28 ENCOUNTER — OFFICE VISIT (OUTPATIENT)
Dept: FAMILY MEDICINE CLINIC | Age: 33
End: 2023-08-28
Payer: COMMERCIAL

## 2023-08-28 VITALS
WEIGHT: 221.2 LBS | HEIGHT: 67 IN | SYSTOLIC BLOOD PRESSURE: 117 MMHG | DIASTOLIC BLOOD PRESSURE: 80 MMHG | HEART RATE: 68 BPM | BODY MASS INDEX: 34.72 KG/M2

## 2023-08-28 DIAGNOSIS — F32.A ANXIETY AND DEPRESSION: Primary | ICD-10-CM

## 2023-08-28 DIAGNOSIS — A59.9 TRICHOMONAS INFECTION: ICD-10-CM

## 2023-08-28 DIAGNOSIS — F41.9 ANXIETY AND DEPRESSION: Primary | ICD-10-CM

## 2023-08-28 PROBLEM — R53.1 LEFT-SIDED WEAKNESS: Status: RESOLVED | Noted: 2022-07-05 | Resolved: 2023-08-28

## 2023-08-28 PROBLEM — L02.415 ABSCESS OF RIGHT THIGH: Status: RESOLVED | Noted: 2022-12-15 | Resolved: 2023-08-28

## 2023-08-28 PROBLEM — R20.0 LEFT SIDED NUMBNESS: Status: RESOLVED | Noted: 2022-07-05 | Resolved: 2023-08-28

## 2023-08-28 PROBLEM — J40 BRONCHITIS: Status: RESOLVED | Noted: 2022-03-31 | Resolved: 2023-08-28

## 2023-08-28 PROCEDURE — 99214 OFFICE O/P EST MOD 30 MIN: CPT | Performed by: NURSE PRACTITIONER

## 2023-08-28 RX ORDER — CITALOPRAM HYDROBROMIDE 10 MG/1
10 TABLET ORAL DAILY
Qty: 90 TABLET | Refills: 0 | Status: SHIPPED | OUTPATIENT
Start: 2023-08-28

## 2023-08-28 NOTE — PROGRESS NOTES
"Dina Lyons presents to [facility] with complaint of  Anxiety (Discuss medication change, pt states Effexor makes her too sleepy to function )    SUBJECTIVE  History of Present Illness    Patient is here to discuss anxiety and depression.  2 months ago, she was started on Effexor 37.5 mg.  She says this medication made her overly sleepy.  She started taking it at bedtime but then was having trouble waking up in the morning.  She was also started on hydroxyzine.  Patient reports this medication also made her very drowsy.  She has neither of these medications seem to be helping her anxiety or depression regardless.  Of note, patient recently found out she had trichomonas.  She was treated in the ER with azithromycin, gentamicin, and Flagyl. The patient denies thoughts of suicide, self-harm, or homicide.       OBJECTIVE  Vital Signs:   /80 (BP Location: Left arm, Patient Position: Sitting)   Pulse 68   Ht 170.2 cm (67.01\")   Wt 100 kg (221 lb 3.2 oz)   BMI 34.63 kg/mý       Physical Exam  Vitals reviewed.   Constitutional:       General: She is not in acute distress.     Appearance: Normal appearance. She is not ill-appearing.   HENT:      Head: Normocephalic and atraumatic.      Nose: Nose normal.      Mouth/Throat:      Mouth: Mucous membranes are moist.      Pharynx: Oropharynx is clear.   Cardiovascular:      Rate and Rhythm: Normal rate and regular rhythm.      Pulses: Normal pulses.      Heart sounds: Normal heart sounds.   Pulmonary:      Effort: Pulmonary effort is normal.      Breath sounds: Normal breath sounds.   Musculoskeletal:      Cervical back: Neck supple.   Skin:     General: Skin is warm and dry.   Neurological:      General: No focal deficit present.      Mental Status: She is alert and oriented to person, place, and time. Mental status is at baseline.   Psychiatric:         Mood and Affect: Mood normal.         Behavior: Behavior normal.         Judgment: " Judgment normal.        Results Review:  The following data was reviewed by JAMES Rodrigues [unfilled] 08:35 EDT.  Office Visit with Fabiola Monte APRN (08/23/2023)   CT/NG, TV, PCR - Urine, Urine, Random Void (08/23/2023 12:17)  Hepatitis panel, acute (08/23/2023 12:24)  HIV-1/O/2 ANTIGEN/ANTIBODY, 4TH GENERATION (08/23/2023 12:24)  RPR (08/23/2023 12:24)  Urinalysis, Microscopic Only - (08/23/2023 13:11)  Urinalysis, Reflex Microscopic and Culture If Indicated (08/23/2023 13:11)  LIPASE (08/23/2023 14:04)  COMPREHENSIVE METABOLIC PANEL (08/23/2023 14:04)  CBC with Auto Diff (08/23/2023 14:04)  Wet Prep, Genital - , (08/23/2023 14:54)    ASSESSMENT AND PLAN:  Diagnoses and all orders for this visit:    1. Anxiety and depression (Primary)  Assessment & Plan:  We will switch to Celexa.  She will start taking 10 mg daily.  She may increase to taking 2 tablets daily after 2 weeks if 1 tablet is not effective.  Patient was educated on medication side effects.  She was instructed to stop taking medication if suicidal thoughts were to occur to notify office.  We will follow-up in 6 weeks for reassessment.    Orders:  -     citalopram (CeleXA) 10 MG tablet; Take 1 tablet by mouth Daily. May increase to 2 tablets daily after 2 weeks.  Dispense: 90 tablet; Refill: 0    2. Trichomonas infection  Comments:  Currently undergoing treatment, will recheck for infection resolution at follow-up. Other STI tests neg             Follow Up   Return in about 6 weeks (around 10/9/2023). Patient to notify office with any acute concerns or issues.  Patient verbalizes understanding, agrees with plan of care and has no further questions upon discharge.     Patient was given instructions and counseling regarding her condition or for health maintenance advice. Please see specific information pulled into the AVS if appropriate.     Discussed the importance of following up with any needed screening tests/labs/specialist appointments and any  requested follow-up recommended by me today. Importance of maintaining follow-up discussed and patient accepts that missed appointments can delay diagnosis and potentially lead to worsening of conditions.    Part of this note may be an electronic transcription/translation of spoken language to printed text using the Dragon Dictation System.

## 2023-08-28 NOTE — ASSESSMENT & PLAN NOTE
We will switch to Celexa.  She will start taking 10 mg daily.  She may increase to taking 2 tablets daily after 2 weeks if 1 tablet is not effective.  Patient was educated on medication side effects.  She was instructed to stop taking medication if suicidal thoughts were to occur to notify office.  We will follow-up in 6 weeks for reassessment.

## 2023-11-09 ENCOUNTER — HOSPITAL ENCOUNTER (OUTPATIENT)
Dept: GENERAL RADIOLOGY | Facility: HOSPITAL | Age: 33
Discharge: HOME | End: 2023-11-09
Admitting: PHYSICIAN ASSISTANT
Payer: COMMERCIAL

## 2023-11-09 ENCOUNTER — OFFICE VISIT (OUTPATIENT)
Dept: FAMILY MEDICINE CLINIC | Age: 33
End: 2023-11-09
Payer: COMMERCIAL

## 2023-11-09 VITALS
HEIGHT: 67 IN | DIASTOLIC BLOOD PRESSURE: 83 MMHG | WEIGHT: 226.6 LBS | HEART RATE: 75 BPM | SYSTOLIC BLOOD PRESSURE: 135 MMHG | TEMPERATURE: 98.7 F | OXYGEN SATURATION: 93 % | BODY MASS INDEX: 35.56 KG/M2

## 2023-11-09 DIAGNOSIS — J40 BRONCHITIS: ICD-10-CM

## 2023-11-09 DIAGNOSIS — R05.1 ACUTE COUGH: ICD-10-CM

## 2023-11-09 DIAGNOSIS — J02.9 SORE THROAT: ICD-10-CM

## 2023-11-09 DIAGNOSIS — J45.21 MILD INTERMITTENT ASTHMA WITH ACUTE EXACERBATION: Primary | ICD-10-CM

## 2023-11-09 PROCEDURE — 87081 CULTURE SCREEN ONLY: CPT | Performed by: PHYSICIAN ASSISTANT

## 2023-11-09 PROCEDURE — 71046 X-RAY EXAM CHEST 2 VIEWS: CPT

## 2023-11-09 RX ORDER — ALBUTEROL SULFATE 90 UG/1
2 AEROSOL, METERED RESPIRATORY (INHALATION) EVERY 4 HOURS PRN
Qty: 6.7 G | Refills: 0 | Status: SHIPPED | OUTPATIENT
Start: 2023-11-09

## 2023-11-09 RX ORDER — PREDNISONE 20 MG/1
40 TABLET ORAL DAILY
Qty: 10 TABLET | Refills: 0 | Status: SHIPPED | OUTPATIENT
Start: 2023-11-09 | End: 2023-11-14

## 2023-11-09 RX ORDER — BROMPHENIRAMINE MALEATE, PSEUDOEPHEDRINE HYDROCHLORIDE, AND DEXTROMETHORPHAN HYDROBROMIDE 2; 30; 10 MG/5ML; MG/5ML; MG/5ML
5 SYRUP ORAL 4 TIMES DAILY PRN
Qty: 118 ML | Refills: 0 | Status: SHIPPED | OUTPATIENT
Start: 2023-11-09

## 2023-11-09 NOTE — PROGRESS NOTES
Diagnoses and all orders for this visit:    1. Mild intermittent asthma with acute exacerbation (Primary)  Comments:  Albuterol refilled and prednisone prescribed. ER precautions discussed.  Orders:  -     albuterol sulfate  (90 Base) MCG/ACT inhaler; Inhale 2 puffs Every 4 (Four) Hours As Needed for Wheezing.  Dispense: 6.7 g; Refill: 0  -     predniSONE (DELTASONE) 20 MG tablet; Take 2 tablets by mouth Daily for 5 days.  Dispense: 10 tablet; Refill: 0    2. Bronchitis  -     albuterol sulfate  (90 Base) MCG/ACT inhaler; Inhale 2 puffs Every 4 (Four) Hours As Needed for Wheezing.  Dispense: 6.7 g; Refill: 0    3. Acute cough  Comments:  Declines PCR testing. Advised to repeat rapid test at home in 24 hours.  Orders:  -     POCT SARS-CoV-2 Antigen NIRANJAN + Flu  -     XR Chest PA & Lateral; Future  -     brompheniramine-pseudoephedrine-DM 30-2-10 MG/5ML syrup; Take 5 mL by mouth 4 (Four) Times a Day As Needed for Allergies.  Dispense: 118 mL; Refill: 0    4. Sore throat  -     POCT rapid strep A  -     Beta Strep Culture, Throat - , Throat; Future  -     Beta Strep Culture, Throat - Swab, Throat            Subjective     CHIEF COMPLAINT    Chief Complaint   Patient presents with    Exposure To Known Illness     Covid. Pt states her friend tested + for covid on Tuesday and she drank after her on Monday. Pt c/o chest tightness, cough, body aches, and sore throat.             History of Present Illness  This is a 33 year old female who presents with cough and congestion x 2 days. She reports she shared a drink with a friend on Monday and then her friend tested positive for COVID on Tuesday. She states symptoms began as a dry, tickling cough and has progressed to chills, chest tightness, and hoarseness. She has a PMH of asthma but has been out her albuterol inhaler. She denies any nausea, vomiting, or diarrhea.             Review of Systems   Constitutional:  Positive for chills, fatigue and fever.   HENT:   Positive for congestion, postnasal drip, rhinorrhea, sneezing, sore throat and voice change. Negative for ear pain, sinus pressure, sinus pain and trouble swallowing.    Respiratory:  Positive for cough, chest tightness, shortness of breath and wheezing.    Cardiovascular:  Negative for chest pain and palpitations.   Gastrointestinal:  Negative for abdominal pain, diarrhea, nausea and vomiting.   Musculoskeletal:  Positive for myalgias.   Skin:  Negative for rash.   Neurological:  Negative for dizziness and headaches.            Past Medical History:   Diagnosis Date    Abscess of right thigh 12/15/2022    Anxiety and depression     Bronchitis 2022    Family history of complication of anesthesia     MOTHER HAS SEVERE NAUSEA    History of migraine     Left sided numbness 2022    Left-sided weakness 2022    Migraine     Ovarian cyst     Ovarian cyst     RIGHT    Ovarian tumor (benign), left     Pyelonephritis             Past Surgical History:   Procedure Laterality Date    ADENOIDECTOMY      APPENDECTOMY  2006     SECTION      X1    CHOLECYSTECTOMY  2006    DIAGNOSTIC LAPAROSCOPY Left 2021    Procedure: SALPINGO OOPHORECTOMY  LAPAROSCOPY, LYSIS OF ADHESIONS;  Surgeon: Don Proctor MD;  Location: [facility] OR [facility] [place];  Service: Obstetrics/Gynecology;  Laterality: Left;    FLAT FOOT RECONSTRUCTION Bilateral 2006    LAPAROTOMY SALPINGO OOPHORECTOMY Left 2021    OVARY SURGERY Left     TO REMOVE BENIGN OVARIAN TUMOR            Family History   Problem Relation Age of Onset    No Known Problems Father     No Known Problems Mother     Malig Hyperthermia Neg Hx             Social History     Socioeconomic History    Marital status: Single   Tobacco Use    Smoking status: Former     Packs/day: 0.50     Years: 15.00     Additional pack years: 0.00     Total pack years: 7.50     Types: Cigarettes     Quit date: 2021     Years since quittin.5    Smokeless tobacco: Never    Tobacco  comments:     none in the past 4 weeks   Vaping Use    Vaping Use: Every day    Substances: Nicotine, Flavoring    Devices: Pre-filled or refillable cartridge, Refillable tank   Substance and Sexual Activity    Alcohol use: Yes     Comment: occasionally    Drug use: Never    Sexual activity: Yes     Birth control/protection: None            Allergies   Allergen Reactions    Codeine Nausea And Vomiting    Contrast Dye (Echo Or Unknown Ct/Mr) Hives     Patient has tolerated contrast dye when pretreated with benadryl    Keflex [Cephalexin] Hives    Levaquin [Levofloxacin] Swelling    Penicillins Hives    Sulfa Antibiotics Hives            Current Outpatient Medications on File Prior to Visit   Medication Sig Dispense Refill    acetaminophen (TYLENOL) 500 MG tablet Take 1-2 tablets by mouth Every 6 (Six) Hours As Needed for Mild Pain.      citalopram (CeleXA) 10 MG tablet Take 1 tablet by mouth Daily. May increase to 2 tablets daily after 2 weeks. 90 tablet 0    cyclobenzaprine (FLEXERIL) 10 MG tablet Take 1 tablet by mouth 3 (Three) Times a Day As Needed for Muscle spasms for up to 12 doses. 12 tablet 0    hydrOXYzine (ATARAX) 25 MG tablet Take 1 tablet by mouth 3 (Three) Times a Day As Needed for Anxiety. 90 tablet 0    ibuprofen (ADVIL,MOTRIN) 600 MG tablet Take 1 tablet by mouth Every 8 (Eight) Hours As Needed for Pain for up to 12 days. 12 tablet 0    rizatriptan (Maxalt) 10 MG tablet Take 1 tablet by mouth 1 (One) Time As Needed for Migraine. May repeat in 2 hours if needed 12 tablet 3    topiramate (TOPAMAX) 50 MG tablet Take 1 tablet by mouth every night at bedtime for 2 weeks, then increase to take 1 tablet twice daily 60 tablet 3    [DISCONTINUED] albuterol sulfate  (90 Base) MCG/ACT inhaler Inhale 2 puffs Every 4 (Four) Hours As Needed for Wheezing. 6.7 g 3    [DISCONTINUED] lidocaine (LIDODERM) 5 % Place 1 patch onto the skin daily for 5 doses. Remove & Discard patch within 12 hours or as directed by  "MD. 5 patch 0     No current facility-administered medications on file prior to visit.            /83   Pulse 75   Temp 98.7 °F (37.1 °C) (Oral)   Ht 170.2 cm (67.01\")   Wt 103 kg (226 lb 9.6 oz)   SpO2 93% Comment: room air  BMI 35.48 kg/m²          Objective     Physical Exam  Vitals and nursing note reviewed.   Constitutional:       General: She is not in acute distress.     Appearance: Normal appearance. She is ill-appearing.   HENT:      Head: Normocephalic and atraumatic.      Right Ear: Ear canal and external ear normal.      Left Ear: Ear canal and external ear normal.      Nose: Congestion and rhinorrhea present.      Mouth/Throat:      Mouth: Mucous membranes are moist.      Pharynx: Posterior oropharyngeal erythema present. No oropharyngeal exudate.   Eyes:      Extraocular Movements: Extraocular movements intact.      Conjunctiva/sclera: Conjunctivae normal.      Pupils: Pupils are equal, round, and reactive to light.   Cardiovascular:      Rate and Rhythm: Normal rate and regular rhythm.      Heart sounds: Normal heart sounds.   Pulmonary:      Effort: Pulmonary effort is normal. No respiratory distress.      Breath sounds: Wheezing present. No rhonchi.      Comments: Decreased breath sounds throughout.   Skin:     General: Skin is warm and dry.      Capillary Refill: Capillary refill takes less than 2 seconds.   Neurological:      General: No focal deficit present.      Mental Status: She is alert and oriented to person, place, and time.   Psychiatric:         Mood and Affect: Mood normal.         Behavior: Behavior normal.           Lab Results (last 24 hours)       Procedure Component Value Units Date/Time    POCT SARS-CoV-2 Antigen NIRANJAN + Flu [[id number]] Collected: 11/09/23 1001    Specimen: Swab Updated: 11/09/23 1001     SARS Antigen Not Detected     Influenza A Antigen NIRANJAN Not Detected     Influenza B Antigen NIRANJAN Not Detected     Internal Control Passed     Lot Number [id number]     " Expiration Date 09/01/24    POCT rapid strep A [[id number]] Collected: 11/09/23 1001    Specimen: Swab Updated: 11/09/23 1002     Rapid Strep A Screen Negative     Internal Control Passed     Lot Number [id number]     Expiration Date 03/31/25    Beta Strep Culture, Throat - Swab, Throat [[id number]] Collected: 11/09/23 1002    Specimen: Swab from Throat Updated: 11/09/23 1146                  XR Chest PA & Lateral    Result Date: 11/9/2023  PROCEDURE: XR CHEST PA AND LATERAL  COMPARISON: [facility], CT, CT CHEST WO CONTRAST DIAGNOSTIC, 3/31/2022, 11:31.  INDICATIONS: COUGH, CONGESTION, SHORTNESS OF BREATH X 1 DAY  FINDINGS:  Heart size and pulmonary vessels are within normal limits.  Lungs are clear bilaterally.  No pleural effusion.  No evidence pneumothorax.  Bony structures are unremarkable.        1. No acute cardiopulmonary disease.     JANNET DAHL MD       Electronically Signed and Approved By: JANNET DAHL MD on 11/09/2023 at 11:01                              Diagnoses and all orders for this visit:    1. Mild intermittent asthma with acute exacerbation (Primary)  Comments:  Albuterol refilled and prednisone prescribed. ER precautions discussed.  Orders:  -     albuterol sulfate  (90 Base) MCG/ACT inhaler; Inhale 2 puffs Every 4 (Four) Hours As Needed for Wheezing.  Dispense: 6.7 g; Refill: 0  -     predniSONE (DELTASONE) 20 MG tablet; Take 2 tablets by mouth Daily for 5 days.  Dispense: 10 tablet; Refill: 0    2. Bronchitis  -     albuterol sulfate  (90 Base) MCG/ACT inhaler; Inhale 2 puffs Every 4 (Four) Hours As Needed for Wheezing.  Dispense: 6.7 g; Refill: 0    3. Acute cough  Comments:  Declines PCR testing. Advised to repeat rapid test at home in 24 hours.  Orders:  -     POCT SARS-CoV-2 Antigen NIRANJAN + Flu  -     XR Chest PA & Lateral; Future  -     brompheniramine-pseudoephedrine-DM 30-2-10 MG/5ML syrup; Take 5 mL by mouth 4 (Four) Times a Day As Needed for Allergies.   Dispense: 118 mL; Refill: 0    4. Sore throat  -     POCT rapid strep A  -     Beta Strep Culture, Throat - , Throat; Future  -     Beta Strep Culture, Throat - Swab, Throat             Additional Instructions for the Follow-ups that You Need to Schedule       Beta Strep Culture, Throat - , Throat    Nov 09, 2023 (Approximate)      Release to patient: Routine Release                Patient was seen by me and APRN student Jr Patterson.             FOR FULL DISCHARGE INSTRUCTIONS/COMMENTS/HANDOUTS please see the   AVS

## 2023-11-09 NOTE — LETTER
November 9, 2023     Patient: Dina Lyons   YOB: 1990   Date of Visit: 11/9/2023       To Whom It May Concern:    It is my medical opinion that Dina Lyons may return to work on 11/13/2023.         Sincerely,          Marissa Chairez PA-C    CC: No Recipients

## 2023-11-11 LAB — BACTERIA SPEC AEROBE CULT: NORMAL

## 2023-11-16 ENCOUNTER — TELEPHONE (OUTPATIENT)
Dept: NEUROLOGY | Facility: CLINIC | Age: 33
End: 2023-11-16

## 2023-11-16 DIAGNOSIS — G43.819 OTHER MIGRAINE WITHOUT STATUS MIGRAINOSUS, INTRACTABLE: ICD-10-CM

## 2023-11-16 RX ORDER — RIZATRIPTAN BENZOATE 10 MG/1
10 TABLET ORAL ONCE AS NEEDED
Qty: 12 TABLET | Refills: 3 | Status: SHIPPED | OUTPATIENT
Start: 2023-11-16

## 2023-11-16 NOTE — TELEPHONE ENCOUNTER
Caller: Dina Lyons    Relationship: Self    Best call back number: [phone]    What was the call regarding: PT REPORTS SHE WAS SEEN AT [facility] ED YESTERDAY MORNING, 9 AM, FOR SEVERE HEADACHE WITH LEFT ARM NUMBNESS AND LEFT-SIDED FACIAL NUMBNESS/TINGLING.    PT REPORTS SHE IS STILL EXPERIENCING THESE SYMPTOMS ALTHOUGH SHE WAS DISCHARGED FROM THE ED. CT HEAD SCAN WAS COMPLETED AND RECORDS ARE ACCESSIBLE VIA [email or web address].    PT REQUESTING URGENT APPT W/ JAMES THOMAS.    Call warm-transferred to: ANABELA FOR FURTHER TRIAGING.    DOCUMENTING PER PROTOCOL.

## 2023-11-16 NOTE — TELEPHONE ENCOUNTER
Spoke with pt on the phone. Stated was seen in th ED for migraine with numbness of right arm, spider web feeling of face, back of scalp burning sensation. Was given migraine cocktail and told to follow up with neurology. States has not seen Candis in about a year. PCP has been filling maxalt. Let pt know provider is out of the office and should contact PCP for refill of maxalt until can be seen in our office. Instructed to return to the ED if symptoms worsen or change. Let her know I would make provider aware. Pt would like appointment sooner than later if possible. Stated last time she saw Candis she said follow up if needed, but BLAKE states follow up in 2 months (9-2022), but no follow up scheduled.

## 2023-11-21 ENCOUNTER — OFFICE VISIT (OUTPATIENT)
Dept: FAMILY MEDICINE CLINIC | Age: 33
End: 2023-11-21
Payer: COMMERCIAL

## 2023-11-21 VITALS
WEIGHT: 229 LBS | BODY MASS INDEX: 35.94 KG/M2 | DIASTOLIC BLOOD PRESSURE: 83 MMHG | SYSTOLIC BLOOD PRESSURE: 119 MMHG | HEART RATE: 76 BPM | HEIGHT: 67 IN

## 2023-11-21 DIAGNOSIS — F32.A ANXIETY AND DEPRESSION: ICD-10-CM

## 2023-11-21 DIAGNOSIS — Z11.3 ROUTINE SCREENING FOR STI (SEXUALLY TRANSMITTED INFECTION): ICD-10-CM

## 2023-11-21 DIAGNOSIS — R20.2 ARM PARESTHESIA, LEFT: Primary | ICD-10-CM

## 2023-11-21 DIAGNOSIS — G43.411 INTRACTABLE HEMIPLEGIC MIGRAINE WITH STATUS MIGRAINOSUS: ICD-10-CM

## 2023-11-21 DIAGNOSIS — F41.9 ANXIETY AND DEPRESSION: ICD-10-CM

## 2023-11-21 PROCEDURE — 99214 OFFICE O/P EST MOD 30 MIN: CPT | Performed by: NURSE PRACTITIONER

## 2023-11-21 RX ORDER — BUSPIRONE HYDROCHLORIDE 5 MG/1
5 TABLET ORAL 3 TIMES DAILY PRN
Qty: 90 TABLET | Refills: 0 | Status: SHIPPED | OUTPATIENT
Start: 2023-11-21

## 2023-11-21 NOTE — PROGRESS NOTES
"Dina Lyons presents to [facility] with complaint of  Follow-up (11/15/23 - [place] ER migraine, pt states she is ran out of rx for celexa, wants to discuss medication today. )    SUBJECTIVE  History of Present Illness    Patient is here for ER visit follow-up.  On November 15 she presented to [facility] for migraine/left sided weakness and numbness.  She is under the care of of neurology, Candis RAMIREZ.  She has not been seen there since July of last year.  Patient says that her migraines for the past year had been very well controlled with Topamax and Maxalt, up until this past week.  Patient says that her migraine has now resolved however she continues to have left-sided arm numbness tingling and weakness.  Patient says she is not able to feel her arm at all and she has to think about what she wants her arm to do whenever she is trying to move it.  She had head CT in the ER that was normal.     She was started on celexa 3 months ago for anxiety and depression, could not feel any difference with this medication.  She has also been on Effexor and Zoloft in the past.  She feels her symptoms are more anxiety related about her health versus depression.  She would like to try different medication.  Patient says she is also been missing work due to her recent health issues, which contributes to her anxiety. Patient is also wanting to recheck to see if trichomonas infection has resolved.  She is asymptomatic of any STI.      OBJECTIVE  Vital Signs:   /83 (BP Location: Right arm, Patient Position: Sitting)   Pulse 76   Ht 170.2 cm (67.01\")   Wt 104 kg (229 lb)   BMI 35.86 kg/m²       Physical Exam  Vitals reviewed.   Constitutional:       General: She is not in acute distress.     Appearance: Normal appearance. She is not ill-appearing.   HENT:      Head: Normocephalic and atraumatic.      Nose: Nose normal.      Mouth/Throat:      Mouth: Mucous membranes are moist.      " Pharynx: Oropharynx is clear.   Cardiovascular:      Rate and Rhythm: Normal rate and regular rhythm.      Pulses: Normal pulses.      Heart sounds: Normal heart sounds.   Pulmonary:      Effort: Pulmonary effort is normal.      Breath sounds: Normal breath sounds.   Musculoskeletal:      Cervical back: Neck supple.      Comments: ANDREA has normal strength and ROM, brachial and radial pulse 2+, color normal    Skin:     General: Skin is warm and dry.   Neurological:      General: No focal deficit present.      Mental Status: She is alert and oriented to person, place, and time. Mental status is at baseline.      Sensory: Sensory deficit (LUE decreased sensation to monofilament) present.      Coordination: Coordination is intact.   Psychiatric:         Mood and Affect: Mood normal.         Behavior: Behavior normal.         Judgment: Judgment normal.          Results Review:  The following data was reviewed by Liset Akbar, JAMES [unfilled] 10:40 EST.    CBC with Auto Diff (11/15/2023 23:19)  COMPREHENSIVE METABOLIC PANEL (11/15/2023 23:19)  C-Reactive Protein (11/15/2023 23:19)  PROCALCITONIN (11/15/2023 23:19)  CT Head Without Contrast (11/15/2023 23:51)     ASSESSMENT AND PLAN:  Diagnoses and all orders for this visit:    1. Arm paresthesia, left (Primary)  -     EMG & Nerve Conduction Test; Future    2. Routine screening for STI (sexually transmitted infection)  -     CT/NG, TV, PCR - , Urine, Random Void; Future    3. Intractable hemiplegic migraine with status migrainosus    4. Anxiety and depression  -     busPIRone (BUSPAR) 5 MG tablet; Take 1 tablet by mouth 3 (Three) Times a Day As Needed for anxiety. If one tablet is not effective may increase to 2 tablets 3 times daily  Dispense: 90 tablet; Refill: 0      It is unusual that her left arm continues to be numb and tingly since migraine has resolved, migraine was 6 days ago.  Patient needs to see neurology given continued left arm paresthesia, but in the meantime we  will see if we can get her scheduled for nerve conduction test. For her anxiety, she will try BuSpar.  Educated on medication side effects.  She declined scheduling follow-up visit but will follow-up as needed if her anxiety and depression is not improving.      Follow Up   Return if symptoms worsen or fail to improve. Patient to notify office with any acute concerns or issues.  Patient verbalizes understanding, agrees with plan of care and has no further questions upon discharge.     Patient was given instructions and counseling regarding her condition or for health maintenance advice. Please see specific information pulled into the AVS if appropriate.     Discussed the importance of following up with any needed screening tests/labs/specialist appointments and any requested follow-up recommended by me today. Importance of maintaining follow-up discussed and patient accepts that missed appointments can delay diagnosis and potentially lead to worsening of conditions.    Part of this note may be an electronic transcription/translation of spoken language to printed text using the Dragon Dictation System.

## 2023-11-21 NOTE — Clinical Note
Can you call pt and see if she can call landry's office back, apparently they left her voicemail offering her an appointment on November 28 here in [place].  See telephone encounter, I apologize I did not see this until after the patient had left.

## 2023-11-28 ENCOUNTER — TELEPHONE (OUTPATIENT)
Dept: FAMILY MEDICINE CLINIC | Age: 33
End: 2023-11-28
Payer: COMMERCIAL

## 2023-11-28 ENCOUNTER — OFFICE VISIT (OUTPATIENT)
Dept: NEUROLOGY | Facility: CLINIC | Age: 33
End: 2023-11-28
Payer: COMMERCIAL

## 2023-11-28 VITALS
DIASTOLIC BLOOD PRESSURE: 88 MMHG | HEIGHT: 67 IN | HEART RATE: 74 BPM | BODY MASS INDEX: 35.88 KG/M2 | SYSTOLIC BLOOD PRESSURE: 131 MMHG | WEIGHT: 228.6 LBS

## 2023-11-28 DIAGNOSIS — G43.411 INTRACTABLE HEMIPLEGIC MIGRAINE WITH STATUS MIGRAINOSUS: Primary | ICD-10-CM

## 2023-11-28 RX ORDER — DEXAMETHASONE 4 MG/1
4 TABLET ORAL
Qty: 6 TABLET | Refills: 0 | Status: SHIPPED | OUTPATIENT
Start: 2023-11-28 | End: 2023-12-01

## 2023-11-28 NOTE — PROGRESS NOTES
"Chief Complaint  Neurologic Problem    Subjective          Dina Lyons presents to [facility]  History of Present Illness  Following up for migraine.  Has not been seen since July 2022.  Has longstanding history of hemiplegic migraine.  Was having about 2-3 headache days per month.  Maxalt was effective abortive therapy.  States she had Covid 3 weeks ago. 2 weeks ago had onset of migraine with left sided arm numbness.  Migraine has persisted for past 2 weeks.  Has had left arm numbness that has persisted for 2 weeks.  PCP ordered EMG that is scheduled for Dec. Reports left retro-orbital and left frontal headache.  Left blurred vision.      Interval History:   States she's had left arm numbness for past 2 weeks.  States she's had intermittent tongue and lip numbness for the past 2 weeks as well.  Some intermittent right arm numbness.  Has had nearly daily headache, associated with tunnel vision, for the last month.  Has history of infrequent migraines, 1 in 6 months or so.  With those migraines, did have numbness associated.  Uses maxalt PRN for abortive therapy with the headache, and that has been somewhat helpful, but does not affect the numbness.  Endorses nausea and photophobia.  Sometimes phonophobia.        Previous preventative migraine medications: amitriptyline (drowsiness), topiramate  Previous abortive migraine medications: maxalt,       Objective   Vital Signs:   /88   Pulse 74   Ht 170.2 cm (67\")   Wt 104 kg (228 lb 9.6 oz)   BMI 35.80 kg/m²     Physical Exam  Neurological:      Mental Status: She is oriented to person, place, and time.      Cranial Nerves: Cranial nerves 2-12 are intact.        Neurologic Exam     Mental Status   Oriented to person, place, and time.     Cranial Nerves   Cranial nerves II through XII intact.     Motor Exam     Strength   Strength 5/5 except as noted.   Left biceps: 4/5  Left wrist flexion: 4/5  Left wrist extension: " 4/5    Sensory Exam   Decreased pinprick to LUE     Gait, Coordination, and Reflexes     Reflexes   Reflexes 2+ except as noted.        Result Review :               Assessment and Plan    Diagnoses and all orders for this visit:    1. Intractable hemiplegic migraine with status migrainosus (Primary)  Assessment & Plan:  Longstanding history of complicated hemiplegic migraine that affects left side of body. Negative CT head from [facility].  Continued migraine for past 2 weeks.  Concern for status migraine with hemiplegic  symptoms.  No neuro deficit outside of left upper extremity numbness and distal weakness. Previously had ordered C Spine MRI that she was unable to complete due to loss of insurance. PCP has EMG scheduled for Dec 4 with Dr. Feliciano.  Will give dexamethasone for abortive therapy of status migraine.  Will proceed with EMG next week if symptoms do not resolve with steroids.  Continue Maxalt PRN for abortive therapy as this is typically effective for her.           Other orders  -     dexAMETHasone (DECADRON) 4 MG tablet; Take 3 tablets by mouth with breakfast on day 1, then 2 tablets on day 2 and then take 1 tablet on day 3  Dispense: 6 tablet; Refill: 0      I spent 40 minutes caring for Dina on this date of service. This time includes time spent by me in the following activities:preparing for the visit, reviewing tests, obtaining and/or reviewing a separately obtained history, performing a medically appropriate examination and/or evaluation , counseling and educating the patient/family/caregiver, ordering medications, tests, or procedures, documenting information in the medical record, and independently interpreting results and communicating that information with the patient/family/caregiver  Follow Up   Return in about 3 months (around 2/28/2024) for Migraine f/u.  Patient was given instructions and counseling regarding her condition or for health maintenance advice. Please see specific information  pulled into the AVS if appropriate.

## 2023-11-30 NOTE — TELEPHONE ENCOUNTER
I did! Sorry I did not mention in my note. She can have 5 days off per month. If she is requesting something different let me know. She is seeing neurology for migraines.

## 2023-12-01 PROBLEM — G43.411 INTRACTABLE HEMIPLEGIC MIGRAINE WITH STATUS MIGRAINOSUS: Status: ACTIVE | Noted: 2023-12-01

## 2023-12-01 NOTE — ASSESSMENT & PLAN NOTE
Longstanding history of complicated hemiplegic migraine that affects left side of body. Negative CT head from [facility].  Continued migraine for past 2 weeks.  Concern for status migraine with hemiplegic  symptoms.  No neuro deficit outside of left upper extremity numbness and distal weakness. Previously had ordered C Spine MRI that she was unable to complete due to loss of insurance. PCP has EMG scheduled for Dec 4 with Dr. Feliciano.  Will give dexamethasone for abortive therapy of status migraine.  Will proceed with EMG next week if symptoms do not resolve with steroids.  Continue Maxalt PRN for abortive therapy as this is typically effective for her.

## 2023-12-04 ENCOUNTER — PROCEDURE VISIT (OUTPATIENT)
Dept: NEUROLOGY | Facility: CLINIC | Age: 33
End: 2023-12-04
Payer: COMMERCIAL

## 2023-12-04 VITALS
SYSTOLIC BLOOD PRESSURE: 134 MMHG | HEART RATE: 85 BPM | BODY MASS INDEX: 35.31 KG/M2 | DIASTOLIC BLOOD PRESSURE: 85 MMHG | HEIGHT: 67 IN | WEIGHT: 225 LBS

## 2023-12-04 DIAGNOSIS — R20.2 ARM PARESTHESIA, LEFT: ICD-10-CM

## 2023-12-04 DIAGNOSIS — G56.03 BILATERAL CARPAL TUNNEL SYNDROME: Primary | ICD-10-CM

## 2023-12-04 NOTE — PROGRESS NOTES
"Chief Complaint  Numbness (Numbness & tingling in BUE, L>R) and NERVE STUDY (BUE)    Subjective          Dina Lyons is a 33 y.o. female who presents to [facility]  History of Present Illness  33-year-old woman evaluated for bilateral hand numbness left greater than right for the last 3 weeks.  She states it is there all the time.  It does get worse and more numb and tingly when she is doing activities such as holding her phone for prolonged period of time.  She states that is always numb and is not worse when she wakes up in the morning.  She states that this is tingly in her hands especially she is doing activities and goes up to her left shoulder on the left hand into her mid forearm and the right hand.  Her fingers are all numb all the time and she drops things.  It involves the whole hand.  She states that she also has migraine headaches and she is being treated for this and her face is numb all the time and gets worse when she has a migraine headache.  Her hands also get numb when she gets a migraine headache as well as her legs intermittently.     Objective   Vital Signs:   /85   Pulse 85   Ht 170.2 cm (67.01\")   Wt 102 kg (225 lb)   BMI 35.23 kg/m²     Physical Exam   There is no weakness of the upper extremity individual muscle testing.  Phalen sign is positive bilaterally.     Assessment and Plan  Diagnoses and all orders for this visit:    1. Bilateral carpal tunnel syndrome (Primary)  Assessment & Plan:  Nerve conduction study is abnormal and shows electrophysiologic evidence for moderate bilateral carpal tunnel syndrome.  I discussed with her that the numbness and tingling in her hand secondary to carpal tunnel syndrome.  It is different from her headaches.  I discussed with her that the treatment is to wear wrist splints nightly for the next 3 to 4 weeks and if there is no improvement of her symptoms she needs to be followed up by orthopedic surgery " for possible carpal tunnel surgery.  Thank you for letting me participate in her care.      2. Arm paresthesia, left  -     EMG & Nerve Conduction Test         Nerve Conduction Study:  7 nerves     EMG:  Not done    Total time spent with the patient and coordinating patient care was 15 minutes.    Follow Up  No follow-ups on file.  Patient was given instructions and counseling regarding her condition or for health maintenance advice. Please see specific information pulled into the AVS if appropriate.

## 2023-12-04 NOTE — TELEPHONE ENCOUNTER
Forms that patient dropped off are not complete document, there is not a providers statement section. Pt will contact HR and get full document.

## 2023-12-04 NOTE — ASSESSMENT & PLAN NOTE
Nerve conduction study is abnormal and shows electrophysiologic evidence for moderate bilateral carpal tunnel syndrome.  I discussed with her that the numbness and tingling in her hand secondary to carpal tunnel syndrome.  It is different from her headaches.  I discussed with her that the treatment is to wear wrist splints nightly for the next 3 to 4 weeks and if there is no improvement of her symptoms she needs to be followed up by orthopedic surgery for possible carpal tunnel surgery.  Thank you for letting me participate in her care.

## 2023-12-05 ENCOUNTER — TELEPHONE (OUTPATIENT)
Dept: NEUROLOGY | Facility: CLINIC | Age: 33
End: 2023-12-05
Payer: COMMERCIAL

## 2023-12-05 ENCOUNTER — TELEPHONE (OUTPATIENT)
Dept: FAMILY MEDICINE CLINIC | Age: 33
End: 2023-12-05
Payer: COMMERCIAL

## 2023-12-05 DIAGNOSIS — G56.03 BILATERAL CARPAL TUNNEL SYNDROME: Primary | ICD-10-CM

## 2023-12-05 NOTE — TELEPHONE ENCOUNTER
Likely post Covid related and we have to give it time, also can take time for steroids to be effective.  Jodie diagnosed her with CTS and initiated treatment for numbness yesterday

## 2023-12-05 NOTE — TELEPHONE ENCOUNTER
Caller: Dina Lyons    Relationship to patient: Self    Best call back number:     [phone] (Mobile)       Patient is needing: PATIENT CALLED IN AND IS REQUESTING A CALL BACK WITH ADVICE. PATIENT HAD AN EMG/ NCV TEST YESTERDAY AND WOULD LIKE A CALL BACK WITH GUIDANCE AS TO IF SHE NEEDS TO BE SEEN BY PCP AFTER THAT TEST.

## 2023-12-05 NOTE — TELEPHONE ENCOUNTER
Caller: AMRIT Cardozo call back number: [phone]       Is it okay if the provider responds through tamycahart: STERIOD PACK DID NOT STOP PT HEADACHES     NEEDS A CALL TO SEE WHAT JOEY WANTS TO DO     PLEASE ADVISE

## 2023-12-15 ENCOUNTER — PREP FOR SURGERY (OUTPATIENT)
Dept: OTHER | Facility: HOSPITAL | Age: 33
End: 2023-12-15
Payer: COMMERCIAL

## 2023-12-15 ENCOUNTER — OFFICE VISIT (OUTPATIENT)
Dept: ORTHOPEDIC SURGERY | Facility: CLINIC | Age: 33
End: 2023-12-15
Payer: COMMERCIAL

## 2023-12-15 VITALS — BODY MASS INDEX: 35.31 KG/M2 | HEART RATE: 80 BPM | WEIGHT: 225 LBS | HEIGHT: 67 IN | OXYGEN SATURATION: 98 %

## 2023-12-15 DIAGNOSIS — G56.03 BILATERAL CARPAL TUNNEL SYNDROME: Primary | ICD-10-CM

## 2023-12-15 RX ORDER — CLINDAMYCIN PHOSPHATE 900 MG/50ML
900 INJECTION INTRAVENOUS ONCE
OUTPATIENT
Start: 2023-12-15 | End: 2023-12-15

## 2023-12-15 NOTE — H&P (VIEW-ONLY)
Chief Complaint  Pain and Follow-up of the Right Wrist and Pain and Follow-up of the Left Wrist    Subjective          Dina Lyons presents to [facility] for   History of Present Illness    Dina presents today for evaluation of the bilateral wrists.  She reports a long history of bilateral hand and wrist pain with numbness in the median nerve distribution.  She feels the left is worse than the right.  She does have some ring and small finger tingling on the left but this is less severe than her thumb, index, long finger symptoms.  She feels weakness.  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  She has had an EMG that showed moderate bilateral carpal tunnel syndrome.    Allergies   Allergen Reactions    Codeine Nausea And Vomiting    Contrast Dye (Echo Or Unknown Ct/Mr) Hives     Patient has tolerated contrast dye when pretreated with benadryl    Keflex [Cephalexin] Hives    Levaquin [Levofloxacin] Swelling    Penicillins Hives    Sulfa Antibiotics Hives        Social History     Socioeconomic History    Marital status: Single   Tobacco Use    Smoking status: Former     Packs/day: 0.50     Years: 15.00     Additional pack years: 0.00     Total pack years: 7.50     Types: Cigarettes     Quit date: 2021     Years since quittin.6     Passive exposure: Past    Smokeless tobacco: Never    Tobacco comments:     none in the past 4 weeks   Vaping Use    Vaping Use: Every day    Substances: Nicotine, Flavoring    Devices: Pre-filled or refillable cartridge, Refillable tank   Substance and Sexual Activity    Alcohol use: Yes     Comment: occasionally    Drug use: Never    Sexual activity: Yes     Birth control/protection: None        I reviewed the patient's chief complaint, history of present illness, review of systems, past medical history, surgical history, family history, social history, medications, and allergy list.     REVIEW OF SYSTEMS    Constitutional:  "Denies fevers, chills, weight loss  Cardiovascular: Denies chest pain, shortness of breath  Skin: Denies rashes, acute skin changes  Neurologic: Denies headache, loss of consciousness  MSK: Bilateral hand pain, numbness      Objective   Vital Signs:   Pulse 80   Ht 170.2 cm (67\")   Wt 102 kg (225 lb)   SpO2 98%   BMI 35.24 kg/m²     Body mass index is 35.24 kg/m².    Physical Exam    General: Alert. No acute distress.   Bilateral hands: No wounds.  No muscle atrophy.  Intact palmar abduction and thumb opposition.  Full active finger range of motion.  5 out of 5  strength.  Paresthesias in the median nerve distribution on the left.  Positive Phalen's compression testing bilaterally.  Palpable radial pulses.    Procedures    Imaging Results (Most Recent)       None                     Assessment and Plan        CT Head Without Contrast    Result Date: 11/16/2023  Narrative: CT HEAD WITHOUT IV CONTRAST   11/15/2023 11:51 PM HISTORY: Headache, chronic, new features or increased frequency TECHNIQUE: Multiple axial CT images were performed from the foramen magnum to the vertex. Coronal reformatted images were reconstructed from axial data set. Individualized dose reduction techniques using automated exposure control or adjustment of mA and/or kV according to the patient size were employed. COMPARISON: 2/20/2014 FINDINGS: No acute intracranial hemorrhage or large acute cortical infarct. The brain volume is normal for the patient's age. Ventricles are normal in size and configuration. No midline shift. The basal cisterns are patent. No skull fracture. The visualized paranasal sinuses and mastoid air cells are clear.    Impression: No acute intracranial hemorrhage or large acute cortical infarct. Images personally reviewed, interpreted and dictated by MALGORZATA Dolan M.D.      Diagnoses and all orders for this visit:    1. Bilateral carpal tunnel syndrome (Primary)        We reviewed her EMG and discussed treatment " options.  We discussed with operative and nonoperative management.  We discussed the risk, benefits, indications, alternatives to a left carpal tunnel release.  She elected to proceed with surgical management.  He may proceed with a right carpal tunnel release in the future depending on her symptoms and progression postoperatively.      Discussed surgery., Risks/benefits discussed with patient including, but not limited to: infection, bleeding, neurovascular damage, re-rupture, aesthetic deformity, need for further surgery, and death., Surgery pamphlet given., and Call or return if worsening symptoms.    Scribed for Vincent Willett MD by Yoli Allison MA  12/15/2023   09:43 EST         Follow Up       2-week postop    Patient was given instructions and counseling regarding her condition or for health maintenance advice. Please see specific information pulled into the AVS if appropriate.       I have personally performed the services described in this document as scribed by the above individual and it is both accurate and complete.     Vincent Willett MD  12/15/23  09:59 EST

## 2023-12-15 NOTE — PROGRESS NOTES
Chief Complaint  Pain and Follow-up of the Right Wrist and Pain and Follow-up of the Left Wrist    Subjective          Dina Lyons presents to [facility] for   History of Present Illness    Dina presents today for evaluation of the bilateral wrists.  She reports a long history of bilateral hand and wrist pain with numbness in the median nerve distribution.  She feels the left is worse than the right.  She does have some ring and small finger tingling on the left but this is less severe than her thumb, index, long finger symptoms.  She feels weakness.  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  She has had an EMG that showed moderate bilateral carpal tunnel syndrome.    Allergies   Allergen Reactions    Codeine Nausea And Vomiting    Contrast Dye (Echo Or Unknown Ct/Mr) Hives     Patient has tolerated contrast dye when pretreated with benadryl    Keflex [Cephalexin] Hives    Levaquin [Levofloxacin] Swelling    Penicillins Hives    Sulfa Antibiotics Hives        Social History     Socioeconomic History    Marital status: Single   Tobacco Use    Smoking status: Former     Packs/day: 0.50     Years: 15.00     Additional pack years: 0.00     Total pack years: 7.50     Types: Cigarettes     Quit date: 2021     Years since quittin.6     Passive exposure: Past    Smokeless tobacco: Never    Tobacco comments:     none in the past 4 weeks   Vaping Use    Vaping Use: Every day    Substances: Nicotine, Flavoring    Devices: Pre-filled or refillable cartridge, Refillable tank   Substance and Sexual Activity    Alcohol use: Yes     Comment: occasionally    Drug use: Never    Sexual activity: Yes     Birth control/protection: None        I reviewed the patient's chief complaint, history of present illness, review of systems, past medical history, surgical history, family history, social history, medications, and allergy list.     REVIEW OF SYSTEMS    Constitutional:  "Denies fevers, chills, weight loss  Cardiovascular: Denies chest pain, shortness of breath  Skin: Denies rashes, acute skin changes  Neurologic: Denies headache, loss of consciousness  MSK: Bilateral hand pain, numbness      Objective   Vital Signs:   Pulse 80   Ht 170.2 cm (67\")   Wt 102 kg (225 lb)   SpO2 98%   BMI 35.24 kg/m²     Body mass index is 35.24 kg/m².    Physical Exam    General: Alert. No acute distress.   Bilateral hands: No wounds.  No muscle atrophy.  Intact palmar abduction and thumb opposition.  Full active finger range of motion.  5 out of 5  strength.  Paresthesias in the median nerve distribution on the left.  Positive Phalen's compression testing bilaterally.  Palpable radial pulses.    Procedures    Imaging Results (Most Recent)       None                     Assessment and Plan        CT Head Without Contrast    Result Date: 11/16/2023  Narrative: CT HEAD WITHOUT IV CONTRAST   11/15/2023 11:51 PM HISTORY: Headache, chronic, new features or increased frequency TECHNIQUE: Multiple axial CT images were performed from the foramen magnum to the vertex. Coronal reformatted images were reconstructed from axial data set. Individualized dose reduction techniques using automated exposure control or adjustment of mA and/or kV according to the patient size were employed. COMPARISON: 2/20/2014 FINDINGS: No acute intracranial hemorrhage or large acute cortical infarct. The brain volume is normal for the patient's age. Ventricles are normal in size and configuration. No midline shift. The basal cisterns are patent. No skull fracture. The visualized paranasal sinuses and mastoid air cells are clear.    Impression: No acute intracranial hemorrhage or large acute cortical infarct. Images personally reviewed, interpreted and dictated by MALGORZATA Dolan M.D.      Diagnoses and all orders for this visit:    1. Bilateral carpal tunnel syndrome (Primary)        We reviewed her EMG and discussed treatment " options.  We discussed with operative and nonoperative management.  We discussed the risk, benefits, indications, alternatives to a left carpal tunnel release.  She elected to proceed with surgical management.  He may proceed with a right carpal tunnel release in the future depending on her symptoms and progression postoperatively.      Discussed surgery., Risks/benefits discussed with patient including, but not limited to: infection, bleeding, neurovascular damage, re-rupture, aesthetic deformity, need for further surgery, and death., Surgery pamphlet given., and Call or return if worsening symptoms.    Scribed for Vincent Willett MD by Yoli Allison MA  12/15/2023   09:43 EST         Follow Up       2-week postop    Patient was given instructions and counseling regarding her condition or for health maintenance advice. Please see specific information pulled into the AVS if appropriate.       I have personally performed the services described in this document as scribed by the above individual and it is both accurate and complete.     Vincent Willett MD  12/15/23  09:59 EST

## 2023-12-27 NOTE — PRE-PROCEDURE INSTRUCTIONS
IMPORTANT INSTRUCTIONS - PRE-ADMISSION TESTING  DO NOT EAT OR CHEW anything after midnight the night before your procedure.    You may have CLEAR liquids up to _2__ hours prior to ARRIVAL time.   Take the following medications the morning of your procedure with JUST A SIP OF WATER:  BUSPAR. FLEXERIL, HYDROXYZINE, MAXALT, ________________    DO NOT BRING your medications to the hospital with you, UNLESS something has changed since your PRE-Admission Testing appointment.  Hold all vitamins, supplements, and NSAIDS (Non- steroidal anti-inflammatory meds) for one week prior to surgery (you MAY take Tylenol or Acetaminophen).  If you are diabetic, check your blood sugar the morning of your procedure. If it is less than 70 or if you are feeling symptomatic, call the following number for further instructions: [phone]_.  Use your inhalers/nebulizers as usual, the morning of your procedure. BRING YOUR INHALERS with you.   Bring your CPAP or BIPAP to hospital, ONLY IF YOU WILL BE SPENDING THE NIGHT.   Make sure you have a ride home and have someone who will stay with you the day of your procedure after you go home.  If you have any questions, please call your Pre-Admission Testing Nurse, ___WARRENICA _ at [phone]___.   Per anesthesia request, do not smoke for 24 hours before your procedure or as instructed by your surgeon.    Clear Liquid Diet        Find out when you need to start a clear liquid diet.   Think of “clear liquids” as anything you could read a newspaper through. This includes things like water, broth, sports drinks, or tea WITHOUT any kind of milk or cream.           Once you are told to start a clear liquid diet, only drink these things until 2 hours before arrival to the hospital or when the hospital says to stop. Total volume limitation: 8 oz.       Clear liquids you CAN drink:   Water   Clear broth: beef, chicken, vegetable, or bone broth with nothing in it   Gatorade   Lemonade or Jordan-aid   Soda    Tea, coffee (NO cream or honey)   Jell-O (without fruit)   Popsicles (without fruit or cream)   Italian ices   Juice without pulp: apple, white, grape   You may use salt, pepper, and sugar   NO RED LIQUIDS     Do NOT drink:   Milk or cream   Soy milk, almond milk, coconut milk, or other non-dairy drinks and   creamers   Milkshakes or smoothies   Tomato juice   Orange juice   Grapefruit juice   Cream soups or any other than broth         Clear Liquid Diet:  Do NOT eat any solid food.  Do NOT eat or suck on mints or candy.  Do NOT chew gum.  Do NOT drink thick liquids like milk or juice with pulp in it.  Do NOT add milk, cream, or anything like soy milk or almond milk to coffee or tea.       PREOPERATIVE (BEFORE SURGERY)              BATHING INSTRUCTIONS  Instructions:    You will need to shower 1  time utilizing the soap provided; at the times indicated   below:     -MORNING OF SURGERY 1/2/24      Wash your hair and face with normal shampoo and soap, rinse it well before using the surgical soap.      In the shower, wet the skin completely with water from your neck to your feet. Apply the cleanser to your   body ONLY FROM THE NECK TO YOUR FEET.     Do NOT USE THE CLEANSER ON YOUR FACE, HEAD, OR GENITAL (PRIVATE) AREAS.   Keep it out of your eyes, ears, and mouth because of the risk of injury to those areas.      Scrub with a clean washcloth for each bath utilizing the soap provided from the top of your body to the   bottom starting at the neck area.      Pay close attention to your armpits, groin area, and the site of surgery.      Wash your body gently for 5 minutes. Stand outside the stream or turn off the water while scrubbing your   body. Do NOT wash with your regular soap after the surgical cleanser is used.      RINSE THE CLEANSER OFF COMPLETELY with plenty of water. Rinse the area again thoroughly.      Dry off with a clean towel. The surgical soap can cause dryness; however do NOT APPLY LOTION,   CREAM,  POWDER, and/or DEODORANT AFTER SHOWERING.     Be sure to where clean clothes after showering.      Ensure CLEAN BED LINENS AFTER FIRST wash with the surgical soap.      NO PETS ALLOWED IN THE BED with you after utilizing the surgical soap.

## 2024-01-01 ENCOUNTER — ANESTHESIA EVENT (OUTPATIENT)
Dept: PERIOP | Facility: HOSPITAL | Age: 34
End: 2024-01-01
Payer: COMMERCIAL

## 2024-01-02 ENCOUNTER — HOSPITAL ENCOUNTER (OUTPATIENT)
Facility: HOSPITAL | Age: 34
Setting detail: HOSPITAL OUTPATIENT SURGERY
Discharge: HOME | End: 2024-01-02
Attending: STUDENT IN AN ORGANIZED HEALTH CARE EDUCATION/TRAINING PROGRAM | Admitting: STUDENT IN AN ORGANIZED HEALTH CARE EDUCATION/TRAINING PROGRAM
Payer: COMMERCIAL

## 2024-01-02 ENCOUNTER — ANESTHESIA (OUTPATIENT)
Dept: PERIOP | Facility: HOSPITAL | Age: 34
End: 2024-01-02
Payer: COMMERCIAL

## 2024-01-02 VITALS
OXYGEN SATURATION: 93 % | RESPIRATION RATE: 18 BRPM | BODY MASS INDEX: 34.74 KG/M2 | WEIGHT: 221.34 LBS | TEMPERATURE: 97.2 F | DIASTOLIC BLOOD PRESSURE: 74 MMHG | SYSTOLIC BLOOD PRESSURE: 138 MMHG | HEIGHT: 67 IN | HEART RATE: 72 BPM

## 2024-01-02 DIAGNOSIS — G56.03 BILATERAL CARPAL TUNNEL SYNDROME: ICD-10-CM

## 2024-01-02 LAB — B-HCG UR QL: NEGATIVE

## 2024-01-02 PROCEDURE — 25010000002 CLINDAMYCIN 900 MG/50ML SOLUTION: Performed by: STUDENT IN AN ORGANIZED HEALTH CARE EDUCATION/TRAINING PROGRAM

## 2024-01-02 PROCEDURE — 25010000002 BUPIVACAINE (PF) 0.25 % SOLUTION: Performed by: STUDENT IN AN ORGANIZED HEALTH CARE EDUCATION/TRAINING PROGRAM

## 2024-01-02 PROCEDURE — 25010000002 FENTANYL CITRATE (PF) 50 MCG/ML SOLUTION

## 2024-01-02 PROCEDURE — 25810000003 LACTATED RINGERS PER 1000 ML: Performed by: ANESTHESIOLOGY

## 2024-01-02 PROCEDURE — 25010000002 MIDAZOLAM PER 1MG: Performed by: ANESTHESIOLOGY

## 2024-01-02 PROCEDURE — 25010000002 KETOROLAC TROMETHAMINE PER 15 MG

## 2024-01-02 PROCEDURE — 81025 URINE PREGNANCY TEST: CPT | Performed by: ANESTHESIOLOGY

## 2024-01-02 PROCEDURE — 25010000002 ONDANSETRON PER 1 MG

## 2024-01-02 PROCEDURE — 64721 CARPAL TUNNEL SURGERY: CPT | Performed by: STUDENT IN AN ORGANIZED HEALTH CARE EDUCATION/TRAINING PROGRAM

## 2024-01-02 PROCEDURE — 25010000002 PROPOFOL 10 MG/ML EMULSION

## 2024-01-02 RX ORDER — MEPERIDINE HYDROCHLORIDE 25 MG/ML
12.5 INJECTION INTRAMUSCULAR; INTRAVENOUS; SUBCUTANEOUS
Status: DISCONTINUED | OUTPATIENT
Start: 2024-01-02 | End: 2024-01-02 | Stop reason: HOSPADM

## 2024-01-02 RX ORDER — ONDANSETRON 2 MG/ML
4 INJECTION INTRAMUSCULAR; INTRAVENOUS ONCE AS NEEDED
Status: DISCONTINUED | OUTPATIENT
Start: 2024-01-02 | End: 2024-01-02 | Stop reason: HOSPADM

## 2024-01-02 RX ORDER — TRAMADOL HYDROCHLORIDE 50 MG/1
50 TABLET ORAL EVERY 4 HOURS PRN
Qty: 10 TABLET | Refills: 0 | Status: SHIPPED | OUTPATIENT
Start: 2024-01-02

## 2024-01-02 RX ORDER — DEXMEDETOMIDINE HYDROCHLORIDE 100 UG/ML
INJECTION, SOLUTION INTRAVENOUS AS NEEDED
Status: DISCONTINUED | OUTPATIENT
Start: 2024-01-02 | End: 2024-01-02 | Stop reason: SURG

## 2024-01-02 RX ORDER — PROMETHAZINE HYDROCHLORIDE 25 MG/1
25 SUPPOSITORY RECTAL ONCE AS NEEDED
Status: DISCONTINUED | OUTPATIENT
Start: 2024-01-02 | End: 2024-01-02 | Stop reason: HOSPADM

## 2024-01-02 RX ORDER — ONDANSETRON 2 MG/ML
INJECTION INTRAMUSCULAR; INTRAVENOUS AS NEEDED
Status: DISCONTINUED | OUTPATIENT
Start: 2024-01-02 | End: 2024-01-02 | Stop reason: SURG

## 2024-01-02 RX ORDER — ONDANSETRON 4 MG/1
4 TABLET, FILM COATED ORAL EVERY 8 HOURS PRN
Qty: 30 TABLET | Refills: 0 | Status: SHIPPED | OUTPATIENT
Start: 2024-01-02

## 2024-01-02 RX ORDER — PROMETHAZINE HYDROCHLORIDE 12.5 MG/1
25 TABLET ORAL ONCE AS NEEDED
Status: DISCONTINUED | OUTPATIENT
Start: 2024-01-02 | End: 2024-01-02 | Stop reason: SDUPTHER

## 2024-01-02 RX ORDER — ACETAMINOPHEN 500 MG
1000 TABLET ORAL ONCE
Status: COMPLETED | OUTPATIENT
Start: 2024-01-02 | End: 2024-01-02

## 2024-01-02 RX ORDER — ONDANSETRON 2 MG/ML
4 INJECTION INTRAMUSCULAR; INTRAVENOUS ONCE AS NEEDED
Status: DISCONTINUED | OUTPATIENT
Start: 2024-01-02 | End: 2024-01-02 | Stop reason: SDUPTHER

## 2024-01-02 RX ORDER — CLINDAMYCIN PHOSPHATE 900 MG/50ML
900 INJECTION, SOLUTION INTRAVENOUS ONCE
Status: COMPLETED | OUTPATIENT
Start: 2024-01-02 | End: 2024-01-02

## 2024-01-02 RX ORDER — BUPIVACAINE HYDROCHLORIDE 2.5 MG/ML
INJECTION, SOLUTION EPIDURAL; INFILTRATION; INTRACAUDAL AS NEEDED
Status: DISCONTINUED | OUTPATIENT
Start: 2024-01-02 | End: 2024-01-02 | Stop reason: HOSPADM

## 2024-01-02 RX ORDER — OXYCODONE HYDROCHLORIDE 5 MG/1
5 TABLET ORAL
Status: DISCONTINUED | OUTPATIENT
Start: 2024-01-02 | End: 2024-01-02 | Stop reason: HOSPADM

## 2024-01-02 RX ORDER — OXYCODONE HYDROCHLORIDE 5 MG/1
5 TABLET ORAL
Status: DISCONTINUED | OUTPATIENT
Start: 2024-01-02 | End: 2024-01-02 | Stop reason: SDUPTHER

## 2024-01-02 RX ORDER — SODIUM CHLORIDE, SODIUM LACTATE, POTASSIUM CHLORIDE, CALCIUM CHLORIDE 600; 310; 30; 20 MG/100ML; MG/100ML; MG/100ML; MG/100ML
9 INJECTION, SOLUTION INTRAVENOUS CONTINUOUS PRN
Status: DISCONTINUED | OUTPATIENT
Start: 2024-01-02 | End: 2024-01-02 | Stop reason: HOSPADM

## 2024-01-02 RX ORDER — MIDAZOLAM HYDROCHLORIDE 2 MG/2ML
2 INJECTION, SOLUTION INTRAMUSCULAR; INTRAVENOUS ONCE
Status: COMPLETED | OUTPATIENT
Start: 2024-01-02 | End: 2024-01-02

## 2024-01-02 RX ORDER — FENTANYL CITRATE 50 UG/ML
INJECTION, SOLUTION INTRAMUSCULAR; INTRAVENOUS AS NEEDED
Status: DISCONTINUED | OUTPATIENT
Start: 2024-01-02 | End: 2024-01-02 | Stop reason: SURG

## 2024-01-02 RX ORDER — KETOROLAC TROMETHAMINE 30 MG/ML
INJECTION, SOLUTION INTRAMUSCULAR; INTRAVENOUS AS NEEDED
Status: DISCONTINUED | OUTPATIENT
Start: 2024-01-02 | End: 2024-01-02 | Stop reason: SURG

## 2024-01-02 RX ORDER — LIDOCAINE HYDROCHLORIDE 20 MG/ML
INJECTION, SOLUTION EPIDURAL; INFILTRATION; INTRACAUDAL; PERINEURAL AS NEEDED
Status: DISCONTINUED | OUTPATIENT
Start: 2024-01-02 | End: 2024-01-02 | Stop reason: SURG

## 2024-01-02 RX ORDER — DOCUSATE SODIUM 100 MG/1
100 CAPSULE, LIQUID FILLED ORAL 2 TIMES DAILY PRN
Qty: 20 CAPSULE | Refills: 0 | Status: SHIPPED | OUTPATIENT
Start: 2024-01-02

## 2024-01-02 RX ORDER — PROMETHAZINE HYDROCHLORIDE 25 MG/1
25 SUPPOSITORY RECTAL ONCE AS NEEDED
Status: DISCONTINUED | OUTPATIENT
Start: 2024-01-02 | End: 2024-01-02 | Stop reason: SDUPTHER

## 2024-01-02 RX ORDER — PROMETHAZINE HYDROCHLORIDE 12.5 MG/1
25 TABLET ORAL ONCE AS NEEDED
Status: DISCONTINUED | OUTPATIENT
Start: 2024-01-02 | End: 2024-01-02 | Stop reason: HOSPADM

## 2024-01-02 RX ORDER — MAGNESIUM HYDROXIDE 1200 MG/15ML
LIQUID ORAL AS NEEDED
Status: DISCONTINUED | OUTPATIENT
Start: 2024-01-02 | End: 2024-01-02 | Stop reason: HOSPADM

## 2024-01-02 RX ADMIN — FENTANYL CITRATE 50 MCG: 50 INJECTION, SOLUTION INTRAMUSCULAR; INTRAVENOUS at 07:15

## 2024-01-02 RX ADMIN — ONDANSETRON 4 MG: 2 INJECTION INTRAMUSCULAR; INTRAVENOUS at 07:33

## 2024-01-02 RX ADMIN — LIDOCAINE HYDROCHLORIDE 50 MG: 20 INJECTION, SOLUTION EPIDURAL; INFILTRATION; INTRACAUDAL; PERINEURAL at 07:15

## 2024-01-02 RX ADMIN — MIDAZOLAM HYDROCHLORIDE 2 MG: 1 INJECTION, SOLUTION INTRAMUSCULAR; INTRAVENOUS at 07:10

## 2024-01-02 RX ADMIN — SODIUM CHLORIDE, POTASSIUM CHLORIDE, SODIUM LACTATE AND CALCIUM CHLORIDE 9 ML/HR: 600; 310; 30; 20 INJECTION, SOLUTION INTRAVENOUS at 07:11

## 2024-01-02 RX ADMIN — DEXMEDETOMIDINE HYDROCHLORIDE 12 MCG: 100 INJECTION, SOLUTION, CONCENTRATE INTRAVENOUS at 07:21

## 2024-01-02 RX ADMIN — KETOROLAC TROMETHAMINE 30 MG: 30 INJECTION, SOLUTION INTRAMUSCULAR; INTRAVENOUS at 07:33

## 2024-01-02 RX ADMIN — DEXMEDETOMIDINE HYDROCHLORIDE 8 MCG: 100 INJECTION, SOLUTION, CONCENTRATE INTRAVENOUS at 07:25

## 2024-01-02 RX ADMIN — PROPOFOL 250 MCG/KG/MIN: 10 INJECTION, EMULSION INTRAVENOUS at 07:15

## 2024-01-02 RX ADMIN — CLINDAMYCIN IN 5 PERCENT DEXTROSE 900 MG: 18 INJECTION, SOLUTION INTRAVENOUS at 07:15

## 2024-01-02 RX ADMIN — ACETAMINOPHEN 1000 MG: 500 TABLET ORAL at 07:10

## 2024-01-02 NOTE — ANESTHESIA POSTPROCEDURE EVALUATION
Patient: Dina Lyons    Procedure Summary       Date: 01/02/24 Room / Location: [facility]  / [facility]    Anesthesia Start: 0713 Anesthesia Stop: 0747    Procedure: LEFT CARPAL TUNNEL RELEASE (Left: Wrist) Diagnosis:       Bilateral carpal tunnel syndrome      (Bilateral carpal tunnel syndrome [G56.03])    Surgeons: Vincent Willett MD Provider: Adam Hernandez MD    Anesthesia Type: MAC ASA Status: 2            Anesthesia Type: MAC    Vitals  Vitals Value Taken Time   /72 01/02/24 0825   Temp 36.2 °C (97.2 °F) 01/02/24 0746   Pulse 68 01/02/24 0825   Resp 18 01/02/24 0746   SpO2 92 % 01/02/24 0825           Post Anesthesia Care and Evaluation    Patient location during evaluation: bedside  Patient participation: complete - patient participated  Level of consciousness: awake  Pain management: adequate    Airway patency: patent  Anesthetic complications: No anesthetic complications  PONV Status: none  Cardiovascular status: acceptable and stable  Respiratory status: acceptable  Hydration status: acceptable    Comments: An Anesthesiologist personally participated in the most demanding procedures (including induction and emergence if applicable) in the anesthesia plan, monitored the course of anesthesia administration at frequent intervals and remained physically present and available for immediate diagnosis and treatment of emergencies.

## 2024-01-02 NOTE — DISCHARGE INSTRUCTIONS
DISCHARGE INSTRUCTIONS  CARPAL TUNNEL RELEASE      For your surgery you had:    Local anesthesia  Monitored anesthesia care    You may experience dizziness, drowsiness, or lightheadedness for several hours following surgery.  Do not stay alone today or tonight.  Limit your activity for 24 hours.  Resume your diet slowly.    You should not drive or operate machinery, drink alcohol, or sign legally binding documents for 24 hours or while you are taking pain medication.       elevate the  arm so that the hand and wrist are above the level of the heart. Elevate affected arm on a pillow when resting.   Use ice to affected area for 48-72 hours. Apply 20 minutes on - 20 minutes off. Do NOT apply directly to skin.  Exercise fingers frequently by making a full fist and fully straightening the fingers. This will help prevent swelling and stiffness.  Do NOT do any heavy lifting, pulling or strenuous activities using the affected hand. [] Keep splint clean and dry.     .  [] Do NOT submerge in water.  [] Keep incision area clean and dry.  [] You may shower or bathe but keep splint dry    .  NOTIFY YOUR DOCTOR IF YOU EXPERIENCE ANY OF THE FOLLOWING:  Temperature greater than 101 degrees Fahrenheit  Shaking Chills  Redness or excessive drainage from incision  Nausea, vomiting and/or pain that is not controlled by prescribed medications  Increase in bleeding or bleeding that is excessive  Unable to urinate in 6 hours after surgery  If unable to reach your doctor, please go to the closest Emergency Room  Last dose of pain medication was given at:   .  You should see   for follow-up care   on   .  Phone number:      SPECIAL INSTRUCTIONS:               I have read and received the above instructions.Orthopedic instructions: 1 pound lifting restriction with the operative hand.  Keep the splint on, intact, clean, dry.  Ice and elevate help reduce pain and swelling.  Perform finger range of motion exercise at least 3 times daily to  help prevent stiffness.  Monitor for increasing pain, drainage through splint, fevers, chills.  Call the office with any concerns.  Follow-up with Dr. Willett in 2 weeks for reevaluation.

## 2024-01-02 NOTE — ANESTHESIA PREPROCEDURE EVALUATION
Anesthesia Evaluation     Patient summary reviewed and Nursing notes reviewed   no history of anesthetic complications:   NPO Solid Status: > 8 hours  NPO Liquid Status: > 2 hours           Airway   Mallampati: II  TM distance: >3 FB  Neck ROM: full  No difficulty expected  Dental - normal exam     Pulmonary     breath sounds clear to auscultation  Cardiovascular   Exercise tolerance: good (4-7 METS)    Rhythm: regular  Rate: normal        Neuro/Psych  (+) psychiatric history Anxiety and Depression  GI/Hepatic/Renal/Endo    (+) obesity    Musculoskeletal     Abdominal    Substance History      OB/GYN          Other                          Anesthesia Plan    ASA 2     MAC     intravenous induction     Anesthetic plan, risks, benefits, and alternatives have been provided, discussed and informed consent has been obtained with: patient.

## 2024-01-02 NOTE — OP NOTE
CARPAL TUNNEL RELEASE  Procedure Report    Patient Name:  Dina Lyons  YOB: 1990    Date of Surgery:  1/2/2024     Indications: Dina is a 33-year-old female with a history of EMG confirmed left carpal tunnel syndrome.  We discussed treatment options.  We discussed with operative and nonoperative management.  We discussed the risks, benefits, indications, alternatives to a left carpal tunnel release.  She elected to proceed with surgery and consent was obtained.    Pre-op Diagnosis:   Bilateral carpal tunnel syndrome [G56.03]       Post-Op Diagnosis Codes:     * Bilateral carpal tunnel syndrome [G56.03]    Procedure/CPT® Codes:      Procedure(s):  LEFT CARPAL TUNNEL RELEASE    Staff:  Surgeon(s):  Vincent Willett MD    Assistant: Danny Weeks RN    Anesthesia: Monitored Anesthesia Care    Estimated Blood Loss: 5 mL    Implants:    Nothing was implanted during the procedure    Specimen:          None        Findings: Thickened transverse carpal ligament     Complications: None    Description of Procedure: The patient was met in the preoperative holding area and the operative extremity was marked. The patient was transported to the operating room and laid supine on the operating room table.  900 mg clindamycin.  An upper arm tourniquet was applied.  Monitored anesthesia care was performed. The left upper extremity was then prepped and draped in the usual sterile fashion. A timeout was taken to ensure the appropriate patient, procedure, and procedural site. All were in agreement.  The left upper extremity was exsanguinated and the tourniquet was inflated to 250 mmHg.  I injected 10 cc of local anesthetic into the operative site.  I made a 3 cm longitudinal incision in line with the radial aspect of the fourth ray in the palm. Sharp dissection was carried down through the skin and subcutaneous tissues. The superficial palmar fascia was identified and incised longitudinally. The transverse carpal  ligament was then identified. This was incised longitudinally and the carpal tunnel was entered. I utilized a scissor to dissect proximal to the transverse carpal ligament at the proximal extent of the incision. A Ragnell retractor was inserted and the transverse carpal ligament was directly visualized proximally. I slid a Alexandria elevator beneath the transverse carpal ligament to free any adhesions. I then slid the scissors proximally to release the proximal extent of the transverse carpal ligament and distal forearm fascia. This was done without complication. No additional soft tissue bands were present proximally. I then moved distally and released the remaining portion of the transverse carpal ligament until the palmar fat pad was identified. The median nerve was directly visualized and was intact. The wound was then thoroughly irrigated. Skin was closed with 4-0 nylon in interrupted fashion. Wound was dressed with Xeroform, fluff, and a well-padded volar resting splint was applied. The tourniquet was released. Less than 2-second capillary refill returned to the fingertips. The patient awoke from anesthesia without complication and was transferred to the recovery in stable condition. All surgical counts were correct.      Assistant: Danny Weeks RN  was responsible for performing the following activities: Retraction, Suction, Irrigation, Closing, and Placing Dressing and their skilled assistance was necessary for the success of this case.    Vincent Willett MD     Date: 1/2/2024  Time: 07:42 EST

## 2024-01-02 NOTE — INTERVAL H&P NOTE
H&P reviewed. The patient was examined and there are no changes to the H&P.    Electronically signed by Vincent Willett MD, 01/02/24, 6:27 AM EST.

## 2024-01-04 ENCOUNTER — TELEPHONE (OUTPATIENT)
Dept: FAMILY MEDICINE CLINIC | Age: 34
End: 2024-01-04
Payer: COMMERCIAL

## 2024-01-04 NOTE — TELEPHONE ENCOUNTER
Over due urine test sent to pcp regarding if pt would still need testing done, pcp states:    This is a urine test to check chlamydia gonorrhea and trichomoniasis. Patient was positive for trichomonas several months ago, she had wanted to repeat test to ensure infection had resolved.

## 2024-01-15 ENCOUNTER — OFFICE VISIT (OUTPATIENT)
Dept: ORTHOPEDIC SURGERY | Facility: CLINIC | Age: 34
End: 2024-01-15
Payer: COMMERCIAL

## 2024-01-15 ENCOUNTER — TELEPHONE (OUTPATIENT)
Dept: ORTHOPEDIC SURGERY | Facility: CLINIC | Age: 34
End: 2024-01-15

## 2024-01-15 VITALS
HEIGHT: 67 IN | SYSTOLIC BLOOD PRESSURE: 119 MMHG | OXYGEN SATURATION: 95 % | DIASTOLIC BLOOD PRESSURE: 84 MMHG | HEART RATE: 92 BPM | BODY MASS INDEX: 35.66 KG/M2 | WEIGHT: 227.2 LBS

## 2024-01-15 DIAGNOSIS — M25.532 LEFT WRIST PAIN: ICD-10-CM

## 2024-01-15 DIAGNOSIS — Z98.890 S/P CARPAL TUNNEL RELEASE: Primary | ICD-10-CM

## 2024-01-15 PROCEDURE — 99024 POSTOP FOLLOW-UP VISIT: CPT

## 2024-01-15 NOTE — PROGRESS NOTES
"Chief Complaint  Follow-up of the Left Wrist and Follow-up of the Left Hand    Subjective      Dina Lyons presents to [facility] for follow up of her left wrist.  Patient is 2 weeks status post left carpal tunnel release performed by Dr. Willett on 1/2/2024.  She is here today in her splint.  She denies any complications from her incision sites.  She denies any fever or chills and surgery.    Allergies   Allergen Reactions    Codeine Nausea And Vomiting    Contrast Dye (Echo Or Unknown Ct/Mr) Hives     Patient has tolerated contrast dye when pretreated with benadryl    Keflex [Cephalexin] Hives    Levaquin [Levofloxacin] Swelling    Penicillins Hives    Sulfa Antibiotics Hives       Objective     Vital Signs:   Vitals:    01/15/24 1001   BP: 119/84   Pulse: 92   SpO2: 95%   Weight: 103 kg (227 lb 3.2 oz)   Height: 170.2 cm (67\")     Body mass index is 35.58 kg/m².    I reviewed the patient's chief complaint, history of present illness, review of systems, past medical history, surgical history, family history, social history, medications, and allergy list.     REVIEW OF SYSTEMS    Constitutional: Denies fevers, chills, weight loss  Cardiovascular: Denies chest pain, shortness of breath  Skin: Denies rashes, acute skin changes  Neurologic: Denies headache, loss of consciousness  MSK: Left wrist pain.     Ortho Exam  Carpal Tunnel Release General: Alert. No acute distress.  Left upper extremity: Splint removed.  Sutures removed today without complications.  Steri strips applied. Well-healing incision about the palm.  No active drainage or redness noted. No concerning signs of infection. Demonstrates full elbow range of motion. Finger range of motion intact.  Associated stiffness with finger range of motion. Demonstrates active wrist flexion and extension with associated stiffness. Thumb opposition intact. Palmar abduction of the thumb intact.  Sensation intact. Neurovascularly intact. " Palpable radial pulse.            Imaging Results (Most Recent)       None                Assessment and Plan   Diagnoses and all orders for this visit:    1. S/P carpal tunnel release, left (Primary)    2. Left wrist pain         Dina Downs presents today 2 weeks post op left carpal tunnel release performed by Dr. Willett on 1/2/2024. Sutures removed today without complications.  Incision is well-healing.  No active drainage or redness noted. No concerning signs of infection. Denies fever or chills. Incision site care was reviewed today. Patient instructed not to soak or submerge incision. Do not apply any lotions, creams, or ointments to the incision at this time.  We discussed concerning signs and symptoms regarding the incision site.  Patient understood and agreed. Okay for patient to apply gauze over the incision while wearing the wrist brace to avoid irritation. Patient was given a carpal tunnel wrist brace today and instructed to wear this brace over the next 2 weeks before gradually decreasing use of the brace. Patient understood and agreed.  Home exercises for wrist and finger range of motion were provided today.  Discussed the importance of these exercises, including working on making a tight fist. We discussed ordering formal occupational therapy at her next visit if needed.       Patient will follow up in 4 weeks for reevaluation.  No new x-rays needed at next visit.    Call or return if symptoms worsen or patient has any concerns.        Tobacco Use: Medium Risk (1/15/2024)    Patient History     Smoking Tobacco Use: Former     Smokeless Tobacco Use: Never     Passive Exposure: Past     Patient reports that they are a nonsmoker; cessation education not applicable.     BMI is >= 30 and <35. (Class 1 Obesity). The following options were offered after discussion;: N/AA      Follow Up   Return in about 1 month (around 2/15/2024).  There are no Patient Instructions on file for this visit.  Patient was given  instructions and counseling regarding her condition or for health maintenance advice. Please see specific information pulled into the AVS if appropriate.

## 2024-01-15 NOTE — TELEPHONE ENCOUNTER
OFFICE OPENED LATE TODAY - CALLED [facility] TO R/S - [facility] WILL CALLING BACK TO R/S HER APPT AT [facility]     SHE CAN BE R/S TO ANY AVAILABLITY THIS MORNING OR THIS AFTERNOON BETWEEN 1:00PM - 3:00 PM    HUB OK TO RELAY AND R/S

## 2024-01-17 ENCOUNTER — TELEMEDICINE (OUTPATIENT)
Dept: FAMILY MEDICINE CLINIC | Facility: TELEHEALTH | Age: 34
End: 2024-01-17
Payer: COMMERCIAL

## 2024-01-17 DIAGNOSIS — J40 BRONCHITIS: Primary | ICD-10-CM

## 2024-01-17 RX ORDER — PREDNISONE 5 MG/1
TABLET ORAL
Qty: 42 TABLET | Refills: 0 | Status: SHIPPED | OUTPATIENT
Start: 2024-01-17 | End: 2024-01-22

## 2024-01-17 RX ORDER — ALBUTEROL SULFATE 90 UG/1
2 AEROSOL, METERED RESPIRATORY (INHALATION) EVERY 4 HOURS PRN
Qty: 6.7 G | Refills: 0 | Status: SHIPPED | OUTPATIENT
Start: 2024-01-17

## 2024-01-17 RX ORDER — BENZONATATE 200 MG/1
200 CAPSULE ORAL 3 TIMES DAILY PRN
Qty: 21 CAPSULE | Refills: 0 | Status: SHIPPED | OUTPATIENT
Start: 2024-01-17

## 2024-01-17 RX ORDER — DEXTROMETHORPHAN HYDROBROMIDE AND PROMETHAZINE HYDROCHLORIDE 15; 6.25 MG/5ML; MG/5ML
5 SYRUP ORAL 4 TIMES DAILY PRN
Qty: 150 ML | Refills: 0 | Status: SHIPPED | OUTPATIENT
Start: 2024-01-17 | End: 2024-01-22

## 2024-01-17 NOTE — PROGRESS NOTES
You have chosen to receive care through a telehealth visit.  Do you consent to use a video/audio connection for your medical care today? Yes     CHIEF COMPLAINT  No chief complaint on file.        HPI  Dina Lyons is a 33 y.o. female  presents with complaint of symptoms began this morning with chest congestion, back is hurting, unable to get deep breath, dry persistent cough, head congestion, runny nose.  Denies fever, wheezing, shortness of breath.     Review of Systems  See HPI    Past Medical History:   Diagnosis Date    Abscess of right thigh 12/15/2022    Anxiety and depression     Bronchitis 2022    Family history of complication of anesthesia     MOTHER HAS SEVERE NAUSEA    History of migraine     Left sided numbness 2022    Left-sided weakness 2022    Migraine     Ovarian cyst     RIGHT    Ovarian tumor (benign), left     Pyelonephritis     Seasonal asthma        Family History   Problem Relation Age of Onset    No Known Problems Father     No Known Problems Mother     Malig Hyperthermia Neg Hx        Social History     Socioeconomic History    Marital status: Single   Tobacco Use    Smoking status: Former     Packs/day: 0.50     Years: 15.00     Additional pack years: 0.00     Total pack years: 7.50     Types: Cigarettes     Quit date: 2021     Years since quittin.7     Passive exposure: Past    Smokeless tobacco: Never    Tobacco comments:     none in the past 4 weeks   Vaping Use    Vaping Use: Every day    Substances: Nicotine, Flavoring    Devices: Pre-filled or refillable cartridge, Refillable tank   Substance and Sexual Activity    Alcohol use: Yes     Comment: occasionally    Drug use: Never    Sexual activity: Yes     Birth control/protection: None       Dina Lyons  reports that she quit smoking about 2 years ago. Her smoking use included cigarettes. She has a 7.50 pack-year smoking history. She has been exposed to tobacco smoke. She has never used smokeless tobacco..                LMP 12/27/2023     PHYSICAL EXAM  Physical Exam   Constitutional: She is oriented to person, place, and time. She appears well-developed and well-nourished. She does not have a sickly appearance. She does not appear ill.   HENT:   Head: Normocephalic and atraumatic.   Pulmonary/Chest: Effort normal.  No respiratory distress.  Neurological: She is alert and oriented to person, place, and time.         Diagnoses and all orders for this visit:    1. Bronchitis (Primary)  -     predniSONE (DELTASONE) 10 MG (21) dose pack; Use as directed on package  Dispense: 21 tablet; Refill: 0  -     albuterol sulfate  (90 Base) MCG/ACT inhaler; Inhale 2 puffs Every 4 (Four) Hours As Needed for Wheezing or Shortness of Air (chest tightness).  Dispense: 18 g; Refill: 0  -     benzonatate (TESSALON) 200 MG capsule; Take 1 capsule by mouth 3 (Three) Times a Day As Needed for Cough.  Dispense: 21 capsule; Refill: 0  -     promethazine-dextromethorphan (PROMETHAZINE-DM) 6.25-15 MG/5ML syrup; Take 5 mL by mouth 4 (Four) Times a Day As Needed for Cough.  Dispense: 150 mL; Refill: 0    --take medications as prescribed  --increase fluids, rest as needed, tylenol or ibuprofen for pain  --f/u in 5-7 days if no improvement        FOLLOW-UP  As discussed during visit with PCP/[facility] if no improvement or Urgent Care/Emergency Department if worsening of symptoms    Patient verbalizes understanding of medication dosage, comfort measures, instructions for treatment and follow-up.    Hope Prescott, JAMES  01/17/2024  17:27 EST    The use of a video visit has been reviewed with the patient and verbal informed consent has been obtained. Myself and Dina Lyons participated in this visit. The patient is located in [address] [place] [address] [place] [address].    I am located in [place], [place]. Mychart and Twilio were utilized. I spent 8 minutes in the patient's chart for this visit.

## 2024-01-22 ENCOUNTER — HOSPITAL ENCOUNTER (OUTPATIENT)
Dept: GENERAL RADIOLOGY | Facility: HOSPITAL | Age: 34
Discharge: HOME | End: 2024-01-22
Payer: COMMERCIAL

## 2024-01-22 ENCOUNTER — OFFICE VISIT (OUTPATIENT)
Dept: FAMILY MEDICINE CLINIC | Age: 34
End: 2024-01-22
Payer: COMMERCIAL

## 2024-01-22 VITALS
HEART RATE: 97 BPM | SYSTOLIC BLOOD PRESSURE: 129 MMHG | HEIGHT: 67 IN | TEMPERATURE: 98.5 F | OXYGEN SATURATION: 94 % | BODY MASS INDEX: 35.63 KG/M2 | DIASTOLIC BLOOD PRESSURE: 83 MMHG | WEIGHT: 227 LBS

## 2024-01-22 DIAGNOSIS — R05.1 ACUTE COUGH: ICD-10-CM

## 2024-01-22 DIAGNOSIS — J45.20 MILD INTERMITTENT ASTHMA WITHOUT COMPLICATION: ICD-10-CM

## 2024-01-22 DIAGNOSIS — R07.81 RIB PAIN ON LEFT SIDE: ICD-10-CM

## 2024-01-22 DIAGNOSIS — J10.1 INFLUENZA B: Primary | ICD-10-CM

## 2024-01-22 DIAGNOSIS — J18.9 COMMUNITY ACQUIRED PNEUMONIA, UNSPECIFIED LATERALITY: ICD-10-CM

## 2024-01-22 DIAGNOSIS — R07.81 RIB PAIN ON RIGHT SIDE: ICD-10-CM

## 2024-01-22 DIAGNOSIS — R07.89 CHEST TIGHTNESS: ICD-10-CM

## 2024-01-22 LAB
EXPIRATION DATE: ABNORMAL
FLUAV AG UPPER RESP QL IA.RAPID: NOT DETECTED
FLUBV AG UPPER RESP QL IA.RAPID: DETECTED
INTERNAL CONTROL: ABNORMAL
Lab: ABNORMAL
SARS-COV-2 AG UPPER RESP QL IA.RAPID: NOT DETECTED

## 2024-01-22 PROCEDURE — 99214 OFFICE O/P EST MOD 30 MIN: CPT

## 2024-01-22 PROCEDURE — 93000 ELECTROCARDIOGRAM COMPLETE: CPT

## 2024-01-22 PROCEDURE — 71046 X-RAY EXAM CHEST 2 VIEWS: CPT

## 2024-01-22 PROCEDURE — 71100 X-RAY EXAM RIBS UNI 2 VIEWS: CPT

## 2024-01-22 PROCEDURE — 87428 SARSCOV & INF VIR A&B AG IA: CPT

## 2024-01-22 RX ORDER — DOXYCYCLINE HYCLATE 100 MG/1
100 CAPSULE ORAL 2 TIMES DAILY
Qty: 14 CAPSULE | Refills: 0 | Status: SHIPPED | OUTPATIENT
Start: 2024-01-22 | End: 2024-01-30

## 2024-01-22 NOTE — Clinical Note
On further follow-up on the EKG she does have questionable left ventricular hypertrophy so she would for further evaluation with an echo of the heart.  Dr. Tay

## 2024-01-22 NOTE — PROGRESS NOTES
Subjective     CHIEF COMPLAINT    Chief Complaint   Patient presents with    Cough     Hurts to breathe cough, pain in left side. Started last Wednesday. Has a telehealth visit w/[facility] on Wednesday, Dx w/bronchitis      History of Present Illness  Patient is a 33-year-old female, presenting to the clinic today with complaints of cough, congestion, shortness of breath.  She did have some wheezing but this has resolved.  She was seen via telehealth on 1- and diagnosed with bronchitis.  She was treated with steroids, and albuterol inhaler, Tessalon Perles and Promethazine DM.  She does not think these helped her symptoms.  She did go to the ER last night because she felt short of breath and was having left-sided rib pain. They prescribed her a DuoNeb in the ER which did help her symptoms and she was discharged.  Patient reports a history of seasonal asthma.  Overall, her symptoms began Wednesday of last week.  She feels like she cannot get a deep breath.      Review of Systems   Constitutional:  Negative for chills and fever.   HENT:  Negative for congestion, rhinorrhea and sore throat.    Respiratory:  Positive for cough, shortness of breath and wheezing (resolved).    Cardiovascular:  Negative for chest pain.   Gastrointestinal:  Negative for nausea and vomiting.   Musculoskeletal:  Negative for myalgias.   Neurological:  Negative for headaches.       Allergies   Allergen Reactions    Codeine Nausea And Vomiting    Contrast Dye (Echo Or Unknown Ct/Mr) Hives     Patient has tolerated contrast dye when pretreated with benadryl    Keflex [Cephalexin] Hives    Levaquin [Levofloxacin] Swelling    Penicillins Hives    Sulfa Antibiotics Hives       Current Outpatient Medications on File Prior to Visit   Medication Sig Dispense Refill    acetaminophen (TYLENOL) 500 MG tablet Take 1-2 tablets by mouth Every 6 (Six) Hours As Needed for Mild Pain.      albuterol sulfate  (90 Base) MCG/ACT inhaler Inhale  "2 puffs Every 4 (Four) Hours As Needed for Wheezing or Shortness of Air (chest tightness). 6.7 g 0    benzonatate (TESSALON) 200 MG capsule Take 1 capsule by mouth 3 (Three) Times a Day As Needed for Cough. 21 capsule 0    busPIRone (BUSPAR) 5 MG tablet Take 1 tablet by mouth 3 (Three) Times a Day As Needed for anxiety. If one tablet is not effective may increase to 2 tablets 3 times daily 90 tablet 0    cyclobenzaprine (FLEXERIL) 10 MG tablet Take 1 tablet by mouth 3 (Three) Times a Day As Needed for Muscle spasms for up to 12 doses. 12 tablet 0    docusate sodium (COLACE) 100 MG capsule Take 1 capsule by mouth 2 (Two) Times a Day As Needed for Constipation. 20 capsule 0    ibuprofen (ADVIL,MOTRIN) 600 MG tablet Take 1 tablet by mouth Every 8 (Eight) Hours As Needed for Pain for up to 12 days. 12 tablet 0    ipratropium-albuterol (DUO-NEB) 0.5-2.5 mg/3 ml nebulizer Inhale 1 vial by nebulization Every 6 (Six) Hours As Needed for wheezing 90 mL 0    rizatriptan (Maxalt) 10 MG tablet Take 1 tablet by mouth 1 (One) Time As Needed for Migraine. May repeat in 2 hours if needed 12 tablet 3    traMADol (ULTRAM) 50 MG tablet Take 1 tablet by mouth Every 4 (Four) Hours As Needed for Moderate Pain. 10 tablet 0     No current facility-administered medications on file prior to visit.     /83 (BP Location: Right arm, Patient Position: Sitting, Cuff Size: Large Adult)   Pulse 97   Temp 98.5 °F (36.9 °C) (Oral)   Ht 170.2 cm (67.01\")   Wt 103 kg (227 lb)   LMP 12/27/2023   SpO2 94%   BMI 35.54 kg/m²     Objective     Physical Exam  Vitals and nursing note reviewed.   Constitutional:       General: She is not in acute distress.     Appearance: Normal appearance. She is not ill-appearing.   HENT:      Head: Normocephalic.      Right Ear: Tympanic membrane, ear canal and external ear normal.      Left Ear: Tympanic membrane, ear canal and external ear normal.      Nose: Nose normal.      Right Sinus: No maxillary sinus " tenderness or frontal sinus tenderness.      Left Sinus: No maxillary sinus tenderness or frontal sinus tenderness.      Mouth/Throat:      Lips: Pink.      Mouth: Mucous membranes are moist.      Pharynx: Oropharynx is clear. Uvula midline. No pharyngeal swelling, oropharyngeal exudate, posterior oropharyngeal erythema or uvula swelling.   Eyes:      Pupils: Pupils are equal, round, and reactive to light.   Cardiovascular:      Rate and Rhythm: Normal rate and regular rhythm.      Heart sounds: Normal heart sounds. No murmur heard.  Pulmonary:      Effort: Pulmonary effort is normal. No accessory muscle usage or respiratory distress.      Breath sounds: Normal breath sounds. No wheezing or rhonchi.   Chest:      Comments: Left ribs tender to palpation   Musculoskeletal:      Cervical back: Normal range of motion.   Lymphadenopathy:      Cervical: No cervical adenopathy.   Skin:     General: Skin is warm and dry.   Neurological:      General: No focal deficit present.      Mental Status: She is alert and oriented to person, place, and time.   Psychiatric:         Mood and Affect: Mood and affect normal.         Behavior: Behavior normal.          Lab Results (last 24 hours)       Procedure Component Value Units Date/Time    POCT SARS-CoV-2 Antigen NIRANJAN + Flu [[id number]]  (Abnormal) Collected: 01/22/24 1438    Specimen: Swab Updated: 01/22/24 1438     SARS Antigen Not Detected     Influenza A Antigen NIRANJAN Not Detected     Influenza B Antigen NIRANJAN Detected     Internal Control Passed     Lot Number [id number]     Expiration Date 09/11/24            XR Ribs 2 View Left    Result Date: 1/22/2024  PROCEDURE: XR RIBS 2 VW LEFT  COMPARISON: [facility], , XR CHEST PA AND LATERAL, 1/22/2024, 15:42.  INDICATIONS: LATERAL LEFT RIB PAIN X 5 DAYS AFTER COUGHING  FINDINGS:  No acute displaced fractures are seen.  Nondisplaced rib fractures are not well seen on radiograph.  The visualized left lung is clear.       No  acute displaced rib fractures.      JORDON LARSON MD       Electronically Signed and Approved By: JORDON LARSON MD on 1/22/2024 at 16:09             XR Chest PA & Lateral    Result Date: 1/22/2024  PROCEDURE: XR CHEST PA AND LATERAL  COMPARISON: [facility] [place], XR CHEST PA AND LATERAL, 11/09/2023, 10:39.  INDICATIONS: COUGH / SHORT OF BREATH X 5 DAYS - FLU POSITIVE  FINDINGS:  Lungs normally expanded.  Heart size normal.  Subtle airspace opacity in the inferior lingula.  No pneumothorax or pleural effusion.       Subtle airspace opacity inferior lingula, atelectasis versus pneumonia.     JORDON LARSON MD       Electronically Signed and Approved By: JORDON LARSON MD on 1/22/2024 at 16:08                The following data was reviewed for today's visit:  EMERGENCY DEPART/[facility] - SCAN - ER Report/[place]/01/21/24 (01/21/2024)    Telemedicine with Hope Prescott APRN (01/17/2024)       Diagnoses and all orders for this visit:    1. Influenza B (Primary)    2. Community acquired pneumonia, unspecified laterality  -     doxycycline (VIBRAMYCIN) 100 MG capsule; Take 1 capsule by mouth 2 (Two) Times a Day for 7 days.  Dispense: 14 capsule; Refill: 0    3. Acute cough  -     POCT SARS-CoV-2 Antigen NIRANJAN + Flu  -     XR Chest PA & Lateral; Future    4. Chest tightness  -     ECG 12 Lead    5. Rib pain on left side  -     XR Ribs 2 View Left; Future    6. Mild intermittent asthma without complication  -     Beclomethasone Diprop HFA (QVAR Redihaler) 40 MCG/ACT inhaler; Inhale 1 puff 2 (Two) Times a Day.  Dispense: 10.6 g; Refill: 0    Patient is positive for influenza B today.  She is negative for COVID and influenza A.  Unfortunately, she has had symptoms for about 5 days and I do not feel like Tamiflu would be of much help for her at this time.  She is not wheezing on exam today.  We did check her oxygen saturation today with ambulation and it did not drop below 94%.  EKG was completed in office as a precaution due to  chest tightness with no acute findings noted after review with on-call provider.  Today, we will check a chest x-ray to rule out any acute concerns.  I do not feel that patient would benefit from any additional oral steroids but I have sent in an inhaled steroid for her.  At this time, continue with albuterol, DuoNebs and inhaled steroid.  Further plan pending chest x-ray results.  We will also check an x-ray of the left ribs given left rib pain. She was given strict return and Er precautions and she voiced understanding.     Addendum: Rib x-ray shows no acute displaced rib fractures.  Chest x-ray shows subtle airspace opacity that could represent atelectasis versus pneumonia.  Given patient's symptoms, will treat with antibiotics for PNA.  Due to antibiotic allergies, doxyxycline prescribed. Called patient and informed her of chest x-ray results and antibiotic sent to pharmacy.  Educated on strict return and ER precautions and she voiced understanding.  Will schedule her follow-up with her PCP in 1 week.    Case was discussed with on call provider, Dr. Tay, who provided recommendations.     Follow up:  Return if symptoms worsen or fail to improve.  Patient was given instructions and counseling regarding her condition or for health maintenance advice. Please see specific information pulled into the AVS if appropriate.

## 2024-01-25 NOTE — PROGRESS NOTES
ECG 12 Lead    Date/Time: 1/25/2024 11:42 AM  Performed by: Nancy Tay MD    Authorized by: Fabiola Monte APRN  Comparison: not compared with previous ECG   Rhythm: sinus rhythm  Rate: normal  Conduction: conduction normal  ST Segments: ST segments normal  T inversion: III  QRS axis: normal    Clinical impression: normal ECG  Comments: 33-year-old with normal sinus rhythm and questionable ventricular hypertrophy.  She warrants an echo for further evaluation of the heart.  Dr. Tay

## 2024-01-30 ENCOUNTER — LAB (OUTPATIENT)
Dept: LAB | Facility: HOSPITAL | Age: 34
End: 2024-01-30
Payer: COMMERCIAL

## 2024-01-30 ENCOUNTER — OFFICE VISIT (OUTPATIENT)
Dept: FAMILY MEDICINE CLINIC | Age: 34
End: 2024-01-30
Payer: COMMERCIAL

## 2024-01-30 VITALS
OXYGEN SATURATION: 97 % | WEIGHT: 223.6 LBS | HEIGHT: 67 IN | DIASTOLIC BLOOD PRESSURE: 80 MMHG | TEMPERATURE: 98 F | SYSTOLIC BLOOD PRESSURE: 136 MMHG | BODY MASS INDEX: 35.09 KG/M2 | HEART RATE: 65 BPM

## 2024-01-30 DIAGNOSIS — J18.9 COMMUNITY ACQUIRED PNEUMONIA, UNSPECIFIED LATERALITY: Primary | ICD-10-CM

## 2024-01-30 DIAGNOSIS — Z11.3 ROUTINE SCREENING FOR STI (SEXUALLY TRANSMITTED INFECTION): ICD-10-CM

## 2024-01-30 DIAGNOSIS — Z83.2 FAMILY HISTORY OF BLOOD COAGULATION DISORDER: ICD-10-CM

## 2024-01-30 DIAGNOSIS — R94.31 ABNORMAL EKG: ICD-10-CM

## 2024-01-30 PROCEDURE — 85220 BLOOC CLOT FACTOR V TEST: CPT

## 2024-01-30 PROCEDURE — 36415 COLL VENOUS BLD VENIPUNCTURE: CPT

## 2024-01-30 PROCEDURE — 87661 TRICHOMONAS VAGINALIS AMPLIF: CPT

## 2024-01-30 PROCEDURE — 81241 F5 GENE: CPT

## 2024-01-30 PROCEDURE — 87591 N.GONORRHOEAE DNA AMP PROB: CPT

## 2024-01-30 PROCEDURE — 87491 CHLMYD TRACH DNA AMP PROBE: CPT

## 2024-01-30 NOTE — PROGRESS NOTES
"Dina Lyons presents to [facility] with complaint of  Follow-up (OV with Mell 1/22/24 for Flu B and pneumonia. )    SUBJECTIVE  History of Present Illness    Patient is here for follow up after being seen 1/22/24 by Fabiola RAMIREZ.  She was diagnosed with flu and pneumonia.  She was treated with doxycycline.  Patient has completed doxycycline and says that she feels significantly better.  She has a rare cough that is nonproductive.  Denies fever chills.  She does have shortness of air that last a couple of seconds that occurs randomly throughout the day.  Patient says that this is not significant.  Her O2 sat is normal in office today.  Patient had EKG at office visit on January 22 that showed possible left ventricular hypertrophy.  Echocardiogram recommended by Dr. Tay and reviewed EKG that day.  Suspect that her chest pain was related to coughing/pneumonia versus cardiac.    Patient is requesting to be tested for factor V.  She says that she recently found out her mom and aunt have this condition.  Patient denies any history with significant overt bleeding.    OBJECTIVE  Vital Signs:   /80   Pulse 65   Temp 98 °F (36.7 °C) (Oral)   Ht 170.2 cm (67.01\")   Wt 101 kg (223 lb 9.6 oz)   SpO2 97% Comment: room air  BMI 35.01 kg/m²       Physical Exam  Vitals reviewed.   Constitutional:       General: She is not in acute distress.     Appearance: Normal appearance. She is not ill-appearing.   HENT:      Head: Normocephalic and atraumatic.      Nose: Nose normal.      Mouth/Throat:      Mouth: Mucous membranes are moist.      Pharynx: Oropharynx is clear.   Cardiovascular:      Rate and Rhythm: Normal rate and regular rhythm.      Pulses: Normal pulses.      Heart sounds: Normal heart sounds.   Pulmonary:      Effort: Pulmonary effort is normal.      Breath sounds: Normal breath sounds.   Musculoskeletal:      Cervical back: Neck supple.   Skin:     General: Skin is warm and " dry.   Neurological:      General: No focal deficit present.      Mental Status: She is alert and oriented to person, place, and time. Mental status is at baseline.   Psychiatric:         Mood and Affect: Mood normal.         Behavior: Behavior normal.         Judgment: Judgment normal.          Results Review:  The following data was reviewed by JAMES Rodrigues [unfilled] 08:35 EST.  XR Chest PA & Lateral (01/22/2024 15:38)   Office Visit with Fabiola Monte APRN (01/22/2024)     ASSESSMENT AND PLAN:  Diagnoses and all orders for this visit:    1. Community acquired pneumonia, unspecified laterality (Primary)  -     XR Chest PA & Lateral; Future    2. Family history of blood coagulation disorder  -     Factor 5 Activity; Future  -     Factor 5 [place]; Future    3. Abnormal EKG  -     Adult Transthoracic Echo Complete W/ Cont if Necessary Per Protocol; Future      Patient is doing very well post flu and pneumonia infection.  We will repeat chest x-ray in 1 month to ensure pneumonia resolution.  Factor V labs ordered per patient request.  Patient is okay with proceeding with echocardiogram to ensure everything looks okay given left-ventricular hypertrophy seen on EKG.      Follow Up   Return if symptoms worsen or fail to improve. Patient to notify office with any acute concerns or issues.  Patient verbalizes understanding, agrees with plan of care and has no further questions upon discharge.     Patient was given instructions and counseling regarding her condition or for health maintenance advice. Please see specific information pulled into the AVS if appropriate.     Discussed the importance of following up with any needed screening tests/labs/specialist appointments and any requested follow-up recommended by me today. Importance of maintaining follow-up discussed and patient accepts that missed appointments can delay diagnosis and potentially lead to worsening of conditions.    Part of this note may be an  electronic transcription/translation of spoken language to printed text using the Dragon Dictation System.

## 2024-01-31 LAB
C TRACH RRNA SPEC QL NAA+PROBE: NEGATIVE
F5 GENE MUT ANL BLD/T: NORMAL
N GONORRHOEA RRNA SPEC QL NAA+PROBE: NEGATIVE

## 2024-02-01 LAB
FACT V ACT/NOR PPP: 114 % (ref 70–150)
T VAGINALIS RRNA SPEC QL NAA+PROBE: NEGATIVE

## 2024-02-05 ENCOUNTER — PATIENT MESSAGE (OUTPATIENT)
Dept: FAMILY MEDICINE CLINIC | Age: 34
End: 2024-02-05
Payer: COMMERCIAL

## 2024-02-05 RX ORDER — FLUCONAZOLE 150 MG/1
150 TABLET ORAL ONCE
Qty: 2 TABLET | Refills: 0 | Status: SHIPPED | OUTPATIENT
Start: 2024-02-05 | End: 2024-02-06

## 2024-02-05 NOTE — TELEPHONE ENCOUNTER
From: Dina Lyons  To: Liset Akbar  Sent: 2/5/2024 8:53 AM EST  Subject: Infection     Felizy Alpena hope all is well. But after I took the last two days of the antibiotics for the pneumonia it gave me a yeast infection. An the monistat isn't helping. Could you call me in an antibiotic for that please? Thanks have a great day

## 2024-02-12 NOTE — H&P (VIEW-ONLY)
"Chief Complaint  Follow-up and Pain of the Left Hand    Subjective      Dina Lyons presents to [facility] for follow up of her left wrist.  Patient is 6-week status post left carpal tunnel release performed by Dr. Willett on 1/2/2024.  She reports she is doing well.  She denies any numbness or tingling in her hand.  She denies any wrist pain.  She states that she is still has some sensitivity to the incision, but it is improving.  She reports her range of motion has returned and she is doing well.  She would like to proceed with scheduling of her right carpal tunnel release at this time.    Allergies   Allergen Reactions    Codeine Nausea And Vomiting    Contrast Dye (Echo Or Unknown Ct/Mr) Hives     Patient has tolerated contrast dye when pretreated with benadryl    Keflex [Cephalexin] Hives    Levaquin [Levofloxacin] Swelling    Penicillins Hives    Sulfa Antibiotics Hives       Objective     Vital Signs:   Vitals:    02/13/24 1023   BP: 127/78   Pulse: 91   SpO2: 96%   Weight: 101 kg (223 lb)   Height: 170.2 cm (67\")     Body mass index is 34.93 kg/m².    I reviewed the patient's chief complaint, history of present illness, review of systems, past medical history, surgical history, family history, social history, medications, and allergy list.     REVIEW OF SYSTEMS    Constitutional: Denies fevers, chills, weight loss  Cardiovascular: Denies chest pain, shortness of breath  Skin: Denies rashes, acute skin changes  Neurologic: Denies headache, loss of consciousness  MSK: Left wrist pain.     Ortho Exam  General: Alert. No acute distress.  Left upper extremity: Well-healed incision about the palm.  No active drainage or redness noted. No concerning signs of infection. Full elbow range of motion. Finger range of motion intact. Full finger range of motion without stiffness. Demonstrates active wrist flexion and extension with associated stiffness. Thumb opposition intact. Palmar " abduction of the thumb intact.  Sensation intact. Neurovascularly intact. Palpable radial pulse.            Imaging Results (Most Recent)       None                Assessment and Plan   Diagnoses and all orders for this visit:    1. S/P carpal tunnel release, left (Primary)         Dina Lyons is here today 6 weeks status post left carpal tunnel release performed by Dr Willett on 1/2/2024. Patient can discontinue carpal tunnel brace at this time. Discussed option of home exercises vs OT for continued ROM and strengthening efforts. Patient elected to continue her home exercises and will call should they decide to pursue therapy. Gradually return to activity as tolerated.    Patient like to proceed with scheduling of the right carpal tunnel release.  Patient discussed risk and benefits with Dr. Willett with her previous appointment, prior to her left carpal tunnel release surgery.  Request for surgery scheduling has been discussed with Dr. Willett.    Follow up as needed. Call with changes or concerns.        Tobacco Use: Medium Risk (2/13/2024)    Patient History     Smoking Tobacco Use: Former     Smokeless Tobacco Use: Never     Passive Exposure: Past     Patient reports that they are a nonsmoker; cessation education not applicable.            Follow Up   Return 2 weeks postop right carpal tunnel release.  There are no Patient Instructions on file for this visit.  Patient was given instructions and counseling regarding her condition or for health maintenance advice. Please see specific information pulled into the AVS if appropriate.

## 2024-02-13 ENCOUNTER — PREP FOR SURGERY (OUTPATIENT)
Dept: OTHER | Facility: HOSPITAL | Age: 34
End: 2024-02-13
Payer: COMMERCIAL

## 2024-02-13 ENCOUNTER — OFFICE VISIT (OUTPATIENT)
Dept: ORTHOPEDIC SURGERY | Facility: CLINIC | Age: 34
End: 2024-02-13
Payer: COMMERCIAL

## 2024-02-13 ENCOUNTER — TELEPHONE (OUTPATIENT)
Dept: ORTHOPEDIC SURGERY | Facility: CLINIC | Age: 34
End: 2024-02-13
Payer: COMMERCIAL

## 2024-02-13 VITALS
HEART RATE: 91 BPM | SYSTOLIC BLOOD PRESSURE: 127 MMHG | OXYGEN SATURATION: 96 % | BODY MASS INDEX: 35 KG/M2 | HEIGHT: 67 IN | WEIGHT: 223 LBS | DIASTOLIC BLOOD PRESSURE: 78 MMHG

## 2024-02-13 DIAGNOSIS — Z98.890 S/P CARPAL TUNNEL RELEASE: Primary | ICD-10-CM

## 2024-02-13 DIAGNOSIS — G56.03 BILATERAL CARPAL TUNNEL SYNDROME: Primary | ICD-10-CM

## 2024-02-13 RX ORDER — CLINDAMYCIN PHOSPHATE 900 MG/50ML
900 INJECTION, SOLUTION INTRAVENOUS ONCE
OUTPATIENT
Start: 2024-02-13 | End: 2024-02-13

## 2024-02-20 ENCOUNTER — OFFICE VISIT (OUTPATIENT)
Dept: NEUROLOGY | Facility: CLINIC | Age: 34
End: 2024-02-20
Payer: COMMERCIAL

## 2024-02-20 VITALS
DIASTOLIC BLOOD PRESSURE: 97 MMHG | WEIGHT: 223.4 LBS | HEIGHT: 67 IN | BODY MASS INDEX: 35.06 KG/M2 | HEART RATE: 66 BPM | SYSTOLIC BLOOD PRESSURE: 135 MMHG

## 2024-02-20 DIAGNOSIS — G56.03 BILATERAL CARPAL TUNNEL SYNDROME: ICD-10-CM

## 2024-02-20 DIAGNOSIS — G43.419 INTRACTABLE HEMIPLEGIC MIGRAINE WITHOUT STATUS MIGRAINOSUS: Primary | ICD-10-CM

## 2024-02-20 RX ORDER — RIZATRIPTAN BENZOATE 10 MG/1
10 TABLET ORAL ONCE AS NEEDED
Qty: 12 TABLET | Refills: 5 | Status: SHIPPED | OUTPATIENT
Start: 2024-02-20

## 2024-02-20 NOTE — PROGRESS NOTES
"Chief Complaint  Neurologic Problem    Opal Lyons presents to [facility]  History of Present Illness  Following up for migraine.  Has been doing better.  Has had about 5 episodes of migraine since last visit. Uses Maxalt PRN for abortive therapy.     Interval History:   States she's had left arm numbness for past 2 weeks.  States she's had intermittent tongue and lip numbness for the past 2 weeks as well.  Some intermittent right arm numbness.  Has had nearly daily headache, associated with tunnel vision, for the last month.  Has history of infrequent migraines, 1 in 6 months or so.  With those migraines, did have numbness associated.  Uses maxalt PRN for abortive therapy with the headache, and that has been somewhat helpful, but does not affect the numbness.  Endorses nausea and photophobia.  Sometimes phonophobia.        Previous preventative migraine medications: amitriptyline (drowsiness), topiramate  Previous abortive migraine medications: maxalt,       Objective   Vital Signs:   /97   Pulse 66   Ht 170.2 cm (67\")   Wt 101 kg (223 lb 6.4 oz)   BMI 34.99 kg/m²     Physical Exam  HENT:      Head: Normocephalic.   Pulmonary:      Effort: Pulmonary effort is normal.   Neurological:      Mental Status: She is alert and oriented to person, place, and time.      Sensory: Sensation is intact.      Motor: Motor function is intact.      Coordination: Coordination is intact.      Deep Tendon Reflexes: Reflexes are normal and symmetric.        Neurologic Exam     Mental Status   Oriented to person, place, and time.        Result Review :             EMG:   Moderate Bilateral CTS    Assessment and Plan    Diagnoses and all orders for this visit:    1. Intractable hemiplegic migraine without status migrainosus (Primary)  Assessment & Plan:  Continue maxalt PRN.             Orders:  -     rizatriptan (Maxalt) 10 MG tablet; Take 1 tablet by mouth 1 (One) " Time As Needed for Migraine. May repeat in 2 hours if needed  Dispense: 12 tablet; Refill: 5    2. Bilateral carpal tunnel syndrome  Assessment & Plan:  EMG positive for CTS.  Clinically symptoms are improved when not in migraine attack.  Will continue to monitor.           Follow Up   Return in about 6 months (around 8/20/2024) for Migraine f/u.  Patient was given instructions and counseling regarding her condition or for health maintenance advice. Please see specific information pulled into the AVS if appropriate.

## 2024-02-20 NOTE — PATIENT INSTRUCTIONS
Migraine Headache  A migraine headache is an intense pulsing or throbbing pain on one or both sides of the head. Migraine headaches may also cause other symptoms, such as nausea, vomiting, and sensitivity to light and noise. A migraine headache can last from 4 hours to 3 days. Talk with your health care provider about what things may bring on (trigger) your migraine headaches.  What are the causes?  The exact cause is not known. However, a migraine may be caused when nerves in the brain get irritated and release chemicals that cause blood vessels to become inflamed. This inflammation causes pain. Migraines may be triggered or caused by:  Smoking.  Medicines, such as:  Nitroglycerin, which is used to treat chest pain.  Birth control pills.  Estrogen.  Certain blood pressure medicines.  Foods or drinks that contain nitrates, glutamate, aspartame, MSG, or tyramine.  Certain foods or drinks, such as aged cheeses, chocolate, alcohol, or caffeine.  Doing physical activity that is very hard.  Other triggers may include:  Menstruation.  Pregnancy.  Hunger.  Stress.  Getting too much or too little sleep.  Weather changes.  Tiredness (fatigue).  What increases the risk?  The following factors may make you more likely to have migraine headaches:  Being between the ages of 25-55 years old.  Being female.  Having a family history of migraine headaches.  Being .  Having a mental health condition, such as depression or anxiety.  Being obese.  What are the signs or symptoms?  The main symptom of this condition is pulsing or throbbing pain. This pain may:  Happen in any area of the head, such as on one or both sides.  Make it hard to do daily activities.  Get worse with physical activity.  Get worse around bright lights, loud noises, or smells.  Other symptoms may include:  Nausea.  Vomiting.  Dizziness.  Before a migraine headache starts, you may get warning signs (an aura). An aura may include:  Seeing flashing lights or  having blind spots.  Seeing bright spots, halos, or zigzag lines.  Having tunnel vision or blurred vision.  Having numbness or a tingling feeling.  Having trouble talking.  Having muscle weakness.  After a migraine ends, you may have symptoms. These may include:  Feeling tired.  Trouble concentrating.  How is this diagnosed?  A migraine headache can be diagnosed based on:  Your symptoms.  A physical exam.  Tests, such as:  A CT scan or an MRI of the head. These tests can help rule out other causes of headaches.  Taking fluid from the spine (lumbar puncture) to examine it (cerebrospinal fluid analysis, or CSF analysis).  How is this treated?  This condition may be treated with medicines that:  Relieve pain and nausea.  Prevent migraines.  Treatment may also include:  Acupuncture.  Lifestyle changes like avoiding foods that trigger migraine headaches.  Learning ways to control your body (biofeedback).  Talk therapy to help you know and deal with negative thoughts (cognitive behavioral therapy).  Follow these instructions at home:  Medicines  Take over-the-counter and prescription medicines only as told by your provider.  Ask your provider if the medicine prescribed to you:  Requires you to avoid driving or using machinery.  Can cause constipation. You may need to take these actions to prevent or treat constipation:  Drink enough fluid to keep your pee (urine) pale yellow.  Take over-the-counter or prescription medicines.  Eat foods that are high in fiber, such as beans, whole grains, and fresh fruits and vegetables.  Limit foods that are high in fat and processed sugars, such as fried or sweet foods.  Lifestyle    Do not drink alcohol.  Do not use any products that contain nicotine or tobacco. These products include cigarettes, chewing tobacco, and vaping devices, such as e-cigarettes. If you need help quitting, ask your provider.  Get 7-9 hours of sleep each night, or the amount recommended by your provider.  Find  ways to manage stress, such as meditation, deep breathing, or yoga.  Try to exercise regularly. This can help lessen how bad and how often your migraines occur.  General instructions  Keep a journal to find out what triggers your migraines, so you can avoid those things. For example, write down:  What you eat and drink.  How much sleep you get.  Any change to your diet or medicines.  If you have a migraine headache:  Avoid things that make your symptoms worse, such as bright lights.  Lie down in a dark, quiet room.  Do not drive or use machinery.  Ask your provider what activities are safe for you while you have symptoms.  Keep all follow-up visits. Your provider will monitor your symptoms and recommend any further treatment.  Where to find more information  Coalition for Headache and Migraine Patients (CHAMP): headachemigraine.org  American Migraine Foundation: americanmigrainefoundation.org  National Headache Foundation: headaches.org  Contact a health care provider if:  You have symptoms that are different or worse than your usual migraine headache symptoms.  You have more than 15 days of headaches in one month.  Get help right away if:  Your migraine headache becomes severe or lasts more than 72 hours.  You have a fever or stiff neck.  You have vision loss.  Your muscles feel weak or like you cannot control them.  You lose your balance often or have trouble walking.  You faint.  You have a seizure.  This information is not intended to replace advice given to you by your health care provider. Make sure you discuss any questions you have with your health care provider.  Document Revised: 08/14/2023 Document Reviewed: 08/14/2023  Elsevier Patient Education © 2023 [employer].

## 2024-02-21 ENCOUNTER — TELEPHONE (OUTPATIENT)
Dept: ORTHOPEDIC SURGERY | Facility: CLINIC | Age: 34
End: 2024-02-21
Payer: COMMERCIAL

## 2024-02-21 NOTE — TELEPHONE ENCOUNTER
PATIENT WAS CALLED AND ASKED WHICH CARDIOLOGIST SHE SEES.  PATIENT HAD TOLD ME THAT SHE WAS SEEING [facility].  [facility] DOESN'T HAVE HER AS A PATIENT.  PATIENT STATES THAT SHE HAS HER FIRST APPOINTMENT WITH [facility] ON 4-1-24.

## 2024-02-23 PROBLEM — G43.419 INTRACTABLE HEMIPLEGIC MIGRAINE WITHOUT STATUS MIGRAINOSUS: Status: ACTIVE | Noted: 2024-02-23

## 2024-02-23 NOTE — PRE-PROCEDURE INSTRUCTIONS
IMPORTANT INSTRUCTIONS - PRE-ADMISSION TESTING  DO NOT EAT OR CHEW anything after midnight the night before your procedure.    You may have CLEAR liquids up to 2_ hours prior to ARRIVAL time.   Take the following medications the morning of your procedure with JUST A SIP OF WATER:INHALERS, NEB  TREATMENT, BUSPIRONE, TYLENOL  IF NEEDED_    DO NOT BRING your medications to the hospital with you, UNLESS something has changed since your PRE-Admission Testing appointment.  Hold all vitamins, supplements, and NSAIDS (Non- steroidal anti-inflammatory meds) for one week prior to surgery (you MAY take Tylenol or Acetaminophen).  If you are diabetic, check your blood sugar the morning of your procedure. If it is less than 70 or if you are feeling symptomatic, call the following number for further instructions: [phone] [facility].  Use your inhalers/nebulizers as usual, the morning of your procedure. BRING YOUR INHALERS with you.   Bring your CPAP or BIPAP to hospital, ONLY IF YOU WILL BE SPENDING THE NIGHT.   Make sure you have a ride home and have someone who will stay with you the day of your procedure after you go home.  If you have any questions, please call your Pre-Admission Testing NurseTONYA at [phone]_.   Per anesthesia request, do not smoke for 24 hours before your procedure or as instructed by your surgeon.    Clear Liquid Diet        Find out when you need to start a clear liquid diet.   Think of “clear liquids” as anything you could read a newspaper through. This includes things like water, broth, sports drinks, or tea WITHOUT any kind of milk or cream.           Once you are told to start a clear liquid diet, only drink these things until 2 hours before arrival to the hospital or when the hospital says to stop. Total volume limitation: 8 oz.       Clear liquids you CAN drink:   Water   Clear broth: beef, chicken, vegetable, or bone broth with nothing in it   Gatorade   Lemonade or  Jordan-aid   Soda   Tea, coffee (NO cream or honey)   Jell-O (without fruit)   Popsicles (without fruit or cream)   Italian ices   Juice without pulp: apple, white, grape   You may use salt, pepper, and sugar    Do NOT drink:   Milk or cream   Soy milk, almond milk, coconut milk, or other non-dairy drinks and   creamers   Milkshakes or smoothies   Tomato juice   Orange juice   Grapefruit juice   Cream soups or any other than broth         Clear Liquid Diet:  Do NOT eat any solid food.  Do NOT eat or suck on mints or candy.  Do NOT chew gum.  Do NOT drink thick liquids like milk or juice with pulp in it.  Do NOT add milk, cream, or anything like soy milk or almond milk to coffee or tea.   PREOPERATIVE (BEFORE SURGERY)              BATHING INSTRUCTIONS  Instructions:    You will need to shower 1 TIME   Wash your hair and face with normal shampoo and soap, rinse it well before using the surgical soap.      In the shower, wet the skin completely with water from your neck to your feet. Apply the cleanser to your   body ONLY FROM THE NECK TO YOUR FEET.     Do NOT USE THE CLEANSER ON YOUR FACE, HEAD, OR GENITAL (PRIVATE) AREAS.   Keep it out of your eyes, ears, and mouth because of the risk of injury to those areas.      Scrub with a clean washcloth for each bath utilizing the soap provided from the top of your body to the   bottom starting at the neck area.      Pay close attention to your armpits, groin area, and the site of surgery.      Wash your body gently for 5 minutes. Stand outside the stream or turn off the water while scrubbing your   body. Do NOT wash with your regular soap after the surgical cleanser is used.      RINSE THE CLEANSER OFF COMPLETELY with plenty of water. Rinse the area again thoroughly.      Dry off with a clean towel. The surgical soap can cause dryness; however do NOT APPLY LOTION,   CREAM, POWDER, and/or DEODORANT AFTER SHOWERING.     Be sure to where clean clothes after showering.      Ensure  CLEAN BED LINENS AFTER FIRST wash with the surgical soap.      NO PETS ALLOWED IN THE BED with you after utilizing the surgical soap.

## 2024-02-23 NOTE — ASSESSMENT & PLAN NOTE
EMG positive for CTS.  Clinically symptoms are improved when not in migraine attack.  Will continue to monitor.

## 2024-02-27 ENCOUNTER — ANESTHESIA (OUTPATIENT)
Dept: PERIOP | Facility: HOSPITAL | Age: 34
End: 2024-02-27
Payer: COMMERCIAL

## 2024-02-27 ENCOUNTER — ANESTHESIA EVENT (OUTPATIENT)
Dept: PERIOP | Facility: HOSPITAL | Age: 34
End: 2024-02-27
Payer: COMMERCIAL

## 2024-02-27 ENCOUNTER — HOSPITAL ENCOUNTER (OUTPATIENT)
Facility: HOSPITAL | Age: 34
Setting detail: HOSPITAL OUTPATIENT SURGERY
Discharge: HOME | End: 2024-02-27
Attending: STUDENT IN AN ORGANIZED HEALTH CARE EDUCATION/TRAINING PROGRAM | Admitting: STUDENT IN AN ORGANIZED HEALTH CARE EDUCATION/TRAINING PROGRAM
Payer: COMMERCIAL

## 2024-02-27 VITALS
DIASTOLIC BLOOD PRESSURE: 78 MMHG | HEART RATE: 94 BPM | OXYGEN SATURATION: 93 % | TEMPERATURE: 98.1 F | HEIGHT: 67 IN | RESPIRATION RATE: 19 BRPM | WEIGHT: 221.12 LBS | BODY MASS INDEX: 34.71 KG/M2 | SYSTOLIC BLOOD PRESSURE: 121 MMHG

## 2024-02-27 DIAGNOSIS — G56.03 BILATERAL CARPAL TUNNEL SYNDROME: Primary | ICD-10-CM

## 2024-02-27 LAB — B-HCG UR QL: NEGATIVE

## 2024-02-27 PROCEDURE — 25010000002 HYDROMORPHONE 1 MG/ML SOLUTION: Performed by: NURSE ANESTHETIST, CERTIFIED REGISTERED

## 2024-02-27 PROCEDURE — 25010000002 MIDAZOLAM PER 1MG

## 2024-02-27 PROCEDURE — 25010000002 BUPIVACAINE (PF) 0.25 % SOLUTION: Performed by: STUDENT IN AN ORGANIZED HEALTH CARE EDUCATION/TRAINING PROGRAM

## 2024-02-27 PROCEDURE — 25810000003 LACTATED RINGERS PER 1000 ML

## 2024-02-27 PROCEDURE — 25010000002 PROPOFOL 10 MG/ML EMULSION: Performed by: NURSE ANESTHETIST, CERTIFIED REGISTERED

## 2024-02-27 PROCEDURE — 25010000002 FENTANYL CITRATE (PF) 50 MCG/ML SOLUTION: Performed by: NURSE ANESTHETIST, CERTIFIED REGISTERED

## 2024-02-27 PROCEDURE — 25010000002 CLINDAMYCIN 900 MG/50ML SOLUTION: Performed by: STUDENT IN AN ORGANIZED HEALTH CARE EDUCATION/TRAINING PROGRAM

## 2024-02-27 PROCEDURE — 64721 CARPAL TUNNEL SURGERY: CPT | Performed by: STUDENT IN AN ORGANIZED HEALTH CARE EDUCATION/TRAINING PROGRAM

## 2024-02-27 PROCEDURE — 25010000002 DEXAMETHASONE PER 1 MG: Performed by: NURSE ANESTHETIST, CERTIFIED REGISTERED

## 2024-02-27 PROCEDURE — 25010000002 ONDANSETRON PER 1 MG: Performed by: NURSE ANESTHETIST, CERTIFIED REGISTERED

## 2024-02-27 PROCEDURE — 81025 URINE PREGNANCY TEST: CPT | Performed by: STUDENT IN AN ORGANIZED HEALTH CARE EDUCATION/TRAINING PROGRAM

## 2024-02-27 RX ORDER — MEPERIDINE HYDROCHLORIDE 25 MG/ML
12.5 INJECTION INTRAMUSCULAR; INTRAVENOUS; SUBCUTANEOUS
Status: DISCONTINUED | OUTPATIENT
Start: 2024-02-27 | End: 2024-02-27 | Stop reason: HOSPADM

## 2024-02-27 RX ORDER — ACETAMINOPHEN 500 MG
1000 TABLET ORAL ONCE
Status: COMPLETED | OUTPATIENT
Start: 2024-02-27 | End: 2024-02-27

## 2024-02-27 RX ORDER — ONDANSETRON 2 MG/ML
4 INJECTION INTRAMUSCULAR; INTRAVENOUS ONCE AS NEEDED
Status: DISCONTINUED | OUTPATIENT
Start: 2024-02-27 | End: 2024-02-27 | Stop reason: HOSPADM

## 2024-02-27 RX ORDER — CLINDAMYCIN PHOSPHATE 900 MG/50ML
900 INJECTION, SOLUTION INTRAVENOUS ONCE
Status: COMPLETED | OUTPATIENT
Start: 2024-02-27 | End: 2024-02-27

## 2024-02-27 RX ORDER — DEXAMETHASONE SODIUM PHOSPHATE 4 MG/ML
INJECTION, SOLUTION INTRA-ARTICULAR; INTRALESIONAL; INTRAMUSCULAR; INTRAVENOUS; SOFT TISSUE AS NEEDED
Status: DISCONTINUED | OUTPATIENT
Start: 2024-02-27 | End: 2024-02-27 | Stop reason: SURG

## 2024-02-27 RX ORDER — ONDANSETRON 4 MG/1
4 TABLET, FILM COATED ORAL EVERY 8 HOURS PRN
Qty: 30 TABLET | Refills: 0 | Status: SHIPPED | OUTPATIENT
Start: 2024-02-27

## 2024-02-27 RX ORDER — TRAMADOL HYDROCHLORIDE 50 MG/1
50 TABLET ORAL EVERY 6 HOURS PRN
Qty: 10 TABLET | Refills: 0 | Status: SHIPPED | OUTPATIENT
Start: 2024-02-27

## 2024-02-27 RX ORDER — DOCUSATE SODIUM 100 MG/1
100 CAPSULE, LIQUID FILLED ORAL 2 TIMES DAILY PRN
Qty: 20 CAPSULE | Refills: 0 | Status: SHIPPED | OUTPATIENT
Start: 2024-02-27

## 2024-02-27 RX ORDER — PROPOFOL 10 MG/ML
VIAL (ML) INTRAVENOUS AS NEEDED
Status: DISCONTINUED | OUTPATIENT
Start: 2024-02-27 | End: 2024-02-27 | Stop reason: SURG

## 2024-02-27 RX ORDER — FENTANYL CITRATE 50 UG/ML
INJECTION, SOLUTION INTRAMUSCULAR; INTRAVENOUS AS NEEDED
Status: DISCONTINUED | OUTPATIENT
Start: 2024-02-27 | End: 2024-02-27 | Stop reason: SURG

## 2024-02-27 RX ORDER — ONDANSETRON 2 MG/ML
INJECTION INTRAMUSCULAR; INTRAVENOUS AS NEEDED
Status: DISCONTINUED | OUTPATIENT
Start: 2024-02-27 | End: 2024-02-27 | Stop reason: SURG

## 2024-02-27 RX ORDER — MIDAZOLAM HYDROCHLORIDE 2 MG/2ML
2 INJECTION, SOLUTION INTRAMUSCULAR; INTRAVENOUS ONCE
Status: COMPLETED | OUTPATIENT
Start: 2024-02-27 | End: 2024-02-27

## 2024-02-27 RX ORDER — PROMETHAZINE HYDROCHLORIDE 12.5 MG/1
25 TABLET ORAL ONCE AS NEEDED
Status: DISCONTINUED | OUTPATIENT
Start: 2024-02-27 | End: 2024-02-27 | Stop reason: HOSPADM

## 2024-02-27 RX ORDER — SODIUM CHLORIDE, SODIUM LACTATE, POTASSIUM CHLORIDE, CALCIUM CHLORIDE 600; 310; 30; 20 MG/100ML; MG/100ML; MG/100ML; MG/100ML
9 INJECTION, SOLUTION INTRAVENOUS CONTINUOUS PRN
Status: DISCONTINUED | OUTPATIENT
Start: 2024-02-27 | End: 2024-02-27 | Stop reason: HOSPADM

## 2024-02-27 RX ORDER — LIDOCAINE HYDROCHLORIDE 20 MG/ML
INJECTION, SOLUTION EPIDURAL; INFILTRATION; INTRACAUDAL; PERINEURAL AS NEEDED
Status: DISCONTINUED | OUTPATIENT
Start: 2024-02-27 | End: 2024-02-27 | Stop reason: SURG

## 2024-02-27 RX ORDER — BUPIVACAINE HYDROCHLORIDE 2.5 MG/ML
INJECTION, SOLUTION EPIDURAL; INFILTRATION; INTRACAUDAL AS NEEDED
Status: DISCONTINUED | OUTPATIENT
Start: 2024-02-27 | End: 2024-02-27 | Stop reason: HOSPADM

## 2024-02-27 RX ORDER — PROMETHAZINE HYDROCHLORIDE 25 MG/1
25 SUPPOSITORY RECTAL ONCE AS NEEDED
Status: DISCONTINUED | OUTPATIENT
Start: 2024-02-27 | End: 2024-02-27 | Stop reason: HOSPADM

## 2024-02-27 RX ORDER — NALOXONE HYDROCHLORIDE 4 MG/.1ML
SPRAY NASAL
Qty: 2 EACH | Refills: 0 | Status: SHIPPED | OUTPATIENT
Start: 2024-02-27

## 2024-02-27 RX ORDER — TRAMADOL HYDROCHLORIDE 50 MG/1
50 TABLET ORAL EVERY 6 HOURS PRN
Qty: 10 TABLET | Refills: 0 | Status: SHIPPED | OUTPATIENT
Start: 2024-02-27 | End: 2024-02-27 | Stop reason: SDUPTHER

## 2024-02-27 RX ORDER — DOCUSATE SODIUM 100 MG/1
100 CAPSULE, LIQUID FILLED ORAL 2 TIMES DAILY PRN
Qty: 20 CAPSULE | Refills: 0 | Status: SHIPPED | OUTPATIENT
Start: 2024-02-27 | End: 2024-02-27 | Stop reason: SDUPTHER

## 2024-02-27 RX ORDER — ONDANSETRON 4 MG/1
4 TABLET, FILM COATED ORAL EVERY 8 HOURS PRN
Qty: 30 TABLET | Refills: 0 | Status: SHIPPED | OUTPATIENT
Start: 2024-02-27 | End: 2024-02-27 | Stop reason: SDUPTHER

## 2024-02-27 RX ADMIN — FENTANYL CITRATE 75 MCG: 50 INJECTION, SOLUTION INTRAMUSCULAR; INTRAVENOUS at 08:40

## 2024-02-27 RX ADMIN — ACETAMINOPHEN 1000 MG: 500 TABLET ORAL at 08:30

## 2024-02-27 RX ADMIN — SODIUM CHLORIDE, POTASSIUM CHLORIDE, SODIUM LACTATE AND CALCIUM CHLORIDE 9 ML/HR: 600; 310; 30; 20 INJECTION, SOLUTION INTRAVENOUS at 08:31

## 2024-02-27 RX ADMIN — PROPOFOL 200 MG: 10 INJECTION, EMULSION INTRAVENOUS at 08:40

## 2024-02-27 RX ADMIN — FENTANYL CITRATE 25 MCG: 50 INJECTION, SOLUTION INTRAMUSCULAR; INTRAVENOUS at 08:53

## 2024-02-27 RX ADMIN — CLINDAMYCIN IN 5 PERCENT DEXTROSE 900 MG: 18 INJECTION, SOLUTION INTRAVENOUS at 08:37

## 2024-02-27 RX ADMIN — DEXAMETHASONE SODIUM PHOSPHATE 4 MG: 4 INJECTION, SOLUTION INTRAMUSCULAR; INTRAVENOUS at 08:48

## 2024-02-27 RX ADMIN — ONDANSETRON HYDROCHLORIDE 4 MG: 2 SOLUTION INTRAMUSCULAR; INTRAVENOUS at 08:48

## 2024-02-27 RX ADMIN — LIDOCAINE HYDROCHLORIDE 100 MG: 20 INJECTION, SOLUTION INTRAVENOUS at 08:40

## 2024-02-27 RX ADMIN — HYDROMORPHONE HYDROCHLORIDE 0.5 MG: 1 INJECTION, SOLUTION INTRAMUSCULAR; INTRAVENOUS; SUBCUTANEOUS at 09:32

## 2024-02-27 RX ADMIN — HYDROMORPHONE HYDROCHLORIDE 0.5 MG: 1 INJECTION, SOLUTION INTRAMUSCULAR; INTRAVENOUS; SUBCUTANEOUS at 09:45

## 2024-02-27 RX ADMIN — MIDAZOLAM HYDROCHLORIDE 2 MG: 1 INJECTION, SOLUTION INTRAMUSCULAR; INTRAVENOUS at 08:32

## 2024-02-27 NOTE — OP NOTE
CARPAL TUNNEL RELEASE  Procedure Report    Patient Name:  Dina Lyons  YOB: 1990    Date of Surgery:  2/27/2024     Indications: Dina is a 33-year-old female with EMG confirmed bilateral carpal tunnel syndrome.  She did very well with a left carpal tunnel release.  We discussed the risks, benefits, indications, and alternatives to a right carpal tunnel release.  She elected to proceed with surgical management and consent was obtained.    Pre-op Diagnosis:   Bilateral carpal tunnel syndrome [G56.03]       Post-Op Diagnosis Codes:     * Bilateral carpal tunnel syndrome [G56.03]    Procedure/CPT® Codes:      Procedure(s):  CARPAL TUNNEL RELEASE    Staff:  Surgeon(s):  Vincent Willett MD    Assistant: Danny Weeks RN    Anesthesia: Monitored Anesthesia Care    Estimated Blood Loss: 0 mL    Implants:    Nothing was implanted during the procedure    Specimen:          None        Findings: Thickened transverse carpal ligament     Complications: None    Description of Procedure: The patient was met in the preoperative holding area and the operative extremity was marked. The patient was transported to the operating room and laid supine on the operating room table.  900 mg of clindamycin was administered.  An upper arm tourniquet was applied.  Monitored anesthesia care was performed. The right upper extremity was then prepped and draped in the usual sterile fashion. A timeout was taken to ensure the appropriate patient, procedure, and procedural site. All were in agreement.  The right upper extremity was exsanguinated and the tourniquet was inflated to 250 mmHg.  I made a 3 cm longitudinal incision in line with the radial aspect of the fourth ray in the palm. Sharp dissection was carried down through the skin and subcutaneous tissues. The superficial palmar fascia was identified and incised longitudinally. The transverse carpal ligament was then identified. This was incised longitudinally and the  carpal tunnel was entered. I utilized a scissor to dissect proximal to the transverse carpal ligament at the proximal extent of the incision. A Ragnell retractor was inserted and the transverse carpal ligament was directly visualized proximally. I slid a Vista elevator beneath the transverse carpal ligament to free any adhesions. I then slid the scissors proximally to release the proximal extent of the transverse carpal ligament and distal forearm fascia. This was done without complication. No additional soft tissue bands were present proximally. I then moved distally and released the remaining portion of the transverse carpal ligament until the palmar fat pad was identified. The median nerve was directly visualized and was intact. The wound was then thoroughly irrigated. Skin was closed with 4-0 nylon in interrupted fashion.  10 cc of local anesthetic was injected.  Wound was dressed with Xeroform, fluff, and a well-padded volar resting splint was applied. The tourniquet was released. Less than 2-second capillary refill returned to the fingertips. The patient awoke from anesthesia without complication and was transferred to the recovery in stable condition. All surgical counts were correct.      Assistant: Danny Weeks, THELMA  was responsible for performing the following activities: Retraction, Suction, Irrigation, Closing, and Placing Dressing and their skilled assistance was necessary for the success of this case.    Vincent Willett MD     Date: 2/27/2024  Time: 09:05 EST

## 2024-02-27 NOTE — DISCHARGE INSTRUCTIONS
Orthopedic instructions: 1 pound lifting restriction with the operative hand.  Keep the splint on, intact, clean, dry.  Ice and elevate help reduce pain and swelling.  Perform finger range of motion exercise at least 3 times daily to help prevent stiffness.  Monitor for increasing pain, drainage through splint, fevers, chills.  Call the office with any concerns.  Follow-up with Dr. Willett in 2 weeks for reevaluation.   DISCHARGE INSTRUCTIONS  CARPAL TUNNEL RELEASE      For your surgery you had:  General anesthesia (you may have a sore throat for the first 24 hours)  IV sedation  Local anesthesia  Monitored anesthesia care  Regional Anesthesia    You have received an anesthesia medication today that can cause hormonal forms of birth control to be ineffective. You should use a different form of birth control (to prevent pregnancy) for 7 days.   You may experience dizziness, drowsiness, or lightheadedness for several hours following surgery.  Do not stay alone today or tonight.  Limit your activity for 24 hours.  Resume your diet slowly.    You should not drive or operate machinery, drink alcohol, or sign legally binding documents for 24 hours or while you are taking pain medication.  . Carry the upper arm in a sling so that the hand and wrist are above the level of the heart. Elevate affected arm on a pillow when resting.   Use ice to affected area for 48-72 hours. Apply 20 minutes on - 20 minutes off. Do NOT apply directly to skin.  Exercise fingers frequently by making a full fist and fully straightening the fingers. This will help prevent swelling and stiffness.  Do NOT do any heavy lifting, pulling or strenuous activities using the affected hand. [x] Keep splint clean and dry.    [x] Do NOT submerge in water.  [x] Keep incision area clean and dry.  [x] You may shower ,KEEPING SPLINT CLEAN AND DRY    TYLENOL AT 0830AM START PAIN MEDS AFTER 1230PM TODAY   NOTIFY YOUR DOCTOR IF YOU EXPERIENCE ANY OF THE  FOLLOWING:  Temperature greater than 101 degrees Fahrenheit  Shaking Chills  Redness or excessive drainage from incision  Nausea, vomiting and/or pain that is not controlled by prescribed medications  Increase in bleeding or bleeding that is excessive  Unable to urinate in 6 hours after surgery  If unable to reach your doctor, please go to the closest Emergency Room  Last dose of pain medication was given at:   .  You should see   for follow-up care   on   .  Phone number:      SPECIAL INSTRUCTIONS:               I have read and received the above instructions.

## 2024-02-27 NOTE — INTERVAL H&P NOTE
H&P reviewed. The patient was examined and there are no changes to the H&P.    Cardiac: Intact peripheral pulses  Pulmonary: Nonlabored respirations  Right upper extremity: No wounds.  No muscle atrophy.  Intact thumb opposition and palmar abduction.  Paresthesias in the median nerve distribution.  Positive Phalen's.  Otherwise neurovascular intact.      We reviewed the risks, benefits, indications, and alternatives to right carpal tunnel release.  We discussed the primary benefit of surgery is relieving the compression of the median nerve.  We discussed surgery risk including bleeding, infection, damage to nerves and blood vessels, persistent pain, recurrent numbness, stiffness, functional limitation, anesthesia risk including mortality, DVT/PE, cosmetic deformity, and need for additional procedures.  She elected to proceed with surgical management.

## 2024-02-27 NOTE — ANESTHESIA POSTPROCEDURE EVALUATION
Patient: Dina Lyons    Procedure Summary       Date: 02/27/24 Room / Location: [facility] OR  / [facility]    Anesthesia Start: 0837 Anesthesia Stop: 0909    Procedure: CARPAL TUNNEL RELEASE (Right: Wrist) Diagnosis:       Bilateral carpal tunnel syndrome      (Bilateral carpal tunnel syndrome [G56.03])    Surgeons: Vincent Willett MD Provider: Claudia Leija MD    Anesthesia Type: MAC ASA Status: 2            Anesthesia Type: MAC    Vitals  Vitals Value Taken Time   /78 02/27/24 1000   Temp 36.7 °C (98.1 °F) 02/27/24 1000   Pulse 94 02/27/24 1000   Resp 19 02/27/24 1000   SpO2 93 % 02/27/24 1000           Post Anesthesia Care and Evaluation    Patient location during evaluation: PACU  Patient participation: complete - patient participated  Level of consciousness: awake and awake and alert  Pain management: adequate    Airway patency: patent  Anesthetic complications: No anesthetic complications  PONV Status: none  Cardiovascular status: acceptable, stable and hemodynamically stable  Respiratory status: acceptable and room air  Hydration status: acceptable    Comments: An Anesthesiologist personally participated in the most demanding procedures (including induction and emergence if applicable) in the anesthesia plan, monitored the course of anesthesia administration at frequent intervals and remained physically present and available for immediate diagnosis and treatment of emergencies.

## 2024-02-27 NOTE — ANESTHESIA PREPROCEDURE EVALUATION
Anesthesia Evaluation     Patient summary reviewed, Nursing notes reviewed and pregnancy test completed   NPO Solid Status: > 8 hours  NPO Liquid Status: > 8 hours           Airway   Mallampati: III  TM distance: >3 FB  Possible difficult intubation  Dental - normal exam     Pulmonary - normal exam   (+) asthma,  Cardiovascular - negative cardio ROS and normal exam  Exercise tolerance: good (4-7 METS)    ECG reviewed  Rhythm: regular  Rate: normal      ROS comment: ECHO scheduled for April 1st. Will proceed with case as LMAC    Neuro/Psych  (+) psychiatric history Anxiety and Depression  GI/Hepatic/Renal/Endo    (+) obesity    Musculoskeletal (-) negative ROS    Abdominal  - normal exam   Substance History - negative use     OB/GYN negative ob/gyn ROS         Other - negative ROS                         Anesthesia Plan    ASA 2     MAC       Anesthetic plan, risks, benefits, and alternatives have been provided, discussed and informed consent has been obtained with: patient.  Pre-procedure education provided  Plan discussed with CRNA.        CODE STATUS:

## 2024-02-28 ENCOUNTER — TELEPHONE (OUTPATIENT)
Dept: ORTHOPEDIC SURGERY | Facility: CLINIC | Age: 34
End: 2024-02-28
Payer: COMMERCIAL

## 2024-02-28 DIAGNOSIS — Z98.890 S/P CARPAL TUNNEL RELEASE: Primary | ICD-10-CM

## 2024-02-28 RX ORDER — HYDROCODONE BITARTRATE AND ACETAMINOPHEN 5; 325 MG/1; MG/1
1 TABLET ORAL EVERY 4 HOURS PRN
Qty: 10 TABLET | Refills: 0 | Status: SHIPPED | OUTPATIENT
Start: 2024-02-28

## 2024-02-28 NOTE — TELEPHONE ENCOUNTER
Hub staff attempted to follow warm transfer process and was unsuccessful     Caller: Dina Lyons    Relationship to patient: Self    Best call back number: [phone]    Patient is needing: PATIENT HAD CARPAL TUNNEL SURGERY YESTERDAY (RIGHT)  AND STATES THAT SHE IS IN A GREAT DEAL OF PAIN. SHE WAS GIVEN TRAMADOL 50 MGS BUT STATES THAT IT IS NOT HELPING HER AT ALL. SHE IS ASKING IF SHE CAN HAVE AN INCREASED DOSAGE OR SOMETHING STRONGER. OKAY TO LEAVE A VOICEMAIL.

## 2024-03-15 ENCOUNTER — OFFICE VISIT (OUTPATIENT)
Dept: ORTHOPEDIC SURGERY | Facility: CLINIC | Age: 34
End: 2024-03-15
Payer: COMMERCIAL

## 2024-03-15 VITALS — SYSTOLIC BLOOD PRESSURE: 133 MMHG | OXYGEN SATURATION: 97 % | DIASTOLIC BLOOD PRESSURE: 90 MMHG | HEART RATE: 78 BPM

## 2024-03-15 DIAGNOSIS — Z98.890 S/P CARPAL TUNNEL RELEASE: Primary | ICD-10-CM

## 2024-03-15 NOTE — PROGRESS NOTES
Chief Complaint  Follow-up of the Right Hand    Subjective          Dina Lyons presents to [facility]   History of Present Illness    Dina Lyons presents today for a follow up of her right wrist. Patient is 2 weeks status post right carpal tunnel release performed by Dr. Willett on 2024. Today, patient states that she is doing fine.  She states that her numbness and tingling to her right hand has resolved.  She denies complications, redness, or drainage from her incision site.  Denies fever or chills since surgery.  She describes some discomfort with her fifth finger with range of motion.      Allergies   Allergen Reactions    Codeine Nausea And Vomiting    Contrast Dye (Echo Or Unknown Ct/Mr) Hives     Patient has tolerated contrast dye when pretreated with benadryl    Keflex [Cephalexin] Hives    Levaquin [Levofloxacin] Swelling    Penicillins Hives    Sulfa Antibiotics Hives        Social History     Socioeconomic History    Marital status: Single   Tobacco Use    Smoking status: Former     Current packs/day: 0.00     Average packs/day: 0.5 packs/day for 15.0 years (7.5 ttl pk-yrs)     Types: Cigarettes     Start date: 2006     Quit date: 2021     Years since quittin.9     Passive exposure: Past    Smokeless tobacco: Never    Tobacco comments:     none in the past 4 weeks   Vaping Use    Vaping status: Every Day    Substances: Nicotine, Flavoring    Devices: Pre-filled or refillable cartridge, Refillable tank   Substance and Sexual Activity    Alcohol use: Yes     Comment: occasionally    Drug use: Never    Sexual activity: Defer        I reviewed the patient's chief complaint, history of present illness, review of systems, past medical history, surgical history, family history, social history, medications, and allergy list.     REVIEW OF SYSTEMS    Constitutional: Denies fevers, chills, weight loss  Cardiovascular: Denies chest pain, shortness of breath  Skin:  Denies rashes, acute skin changes  Neurologic: Denies headache, loss of consciousness  MSK: Right wrist pain      Objective   Vital Signs:   /90   Pulse 78   SpO2 97%     There is no height or weight on file to calculate BMI.    Physical Exam    General: Alert. No acute distress.  Right upper extremity: Splint removed.  Sutures removed today without complications.  Well-healing incision about the palm.  No active drainage or redness noted. No concerning signs of infection.  Forearm soft.  Full finger flexion.  Full finger extension.  Able to make a tight fist.  Diffuse tenderness to palpation of the fifth finger.  No triggering or locking with fifth finger range of motion.  Demonstrates active wrist flexion and extension.  Thumb opposition intact. Palmar abduction of the thumb intact. Intact motor function in the AIN, PIN, and ulnar nerve distributions. Sensation intact the median, radial, and ulnar nerve distributions. Palpable radial pulse.     Procedures    Imaging Results (Most Recent)       None                     Assessment and Plan    Diagnoses and all orders for this visit:    1. S/P carpal tunnel release (Primary)        Dina Lyons presents today 2 weeks post op right carpal tunnel release performed by Dr. Willett on 2/27/2024. Sutures removed today without complications.  Incision is well-healing.  No active drainage or redness noted. No concerning signs of infection. Denies fever or chills. Incision site care was reviewed today. Patient instructed not to soak or submerge incision. Do not apply any lotions, creams, or ointments to the incision at this time.  We discussed concerning signs and symptoms regarding the incision site.  Patient understood and agreed. Okay for patient to apply gauze over the incision while wearing the wrist brace to avoid irritation. Patient was given a comfort form wrist brace today and instructed to wear this brace over the next 2 weeks before gradually decreasing use  of the brace. Patient understood and agreed.  Home exercises for wrist and finger range of motion were provided today.  Discussed the importance of these exercises, including working on making a tight fist. We discussed ordering formal occupational therapy at next visit if necessary.      Patient will follow up in 4 weeks for reevaluation.  No new x-rays needed at next visit.      Call or return if symptoms worsen or patient has any concerns.       Follow Up   Return in about 4 weeks (around 4/12/2024).  Patient was given instructions and counseling regarding her condition or for health maintenance advice. Please see specific information pulled into the AVS if appropriate.     Yarely Licea PA-C  03/15/24  10:55 EDT

## 2024-03-28 ENCOUNTER — OFFICE VISIT (OUTPATIENT)
Dept: FAMILY MEDICINE CLINIC | Age: 34
End: 2024-03-28
Payer: COMMERCIAL

## 2024-03-28 VITALS
TEMPERATURE: 98.1 F | SYSTOLIC BLOOD PRESSURE: 137 MMHG | HEIGHT: 67 IN | OXYGEN SATURATION: 97 % | WEIGHT: 219 LBS | HEART RATE: 75 BPM | BODY MASS INDEX: 34.37 KG/M2 | DIASTOLIC BLOOD PRESSURE: 77 MMHG

## 2024-03-28 DIAGNOSIS — J45.20 MILD INTERMITTENT ASTHMA WITHOUT COMPLICATION: ICD-10-CM

## 2024-03-28 DIAGNOSIS — R05.1 ACUTE COUGH: Primary | ICD-10-CM

## 2024-03-28 LAB
EXPIRATION DATE: NORMAL
FLUAV AG UPPER RESP QL IA.RAPID: NOT DETECTED
FLUBV AG UPPER RESP QL IA.RAPID: NOT DETECTED
INTERNAL CONTROL: NORMAL
Lab: NORMAL
SARS-COV-2 AG UPPER RESP QL IA.RAPID: NOT DETECTED

## 2024-03-28 RX ORDER — IPRATROPIUM BROMIDE AND ALBUTEROL SULFATE 2.5; .5 MG/3ML; MG/3ML
3 SOLUTION RESPIRATORY (INHALATION) EVERY 6 HOURS PRN
Qty: 180 ML | Refills: 0 | Status: SHIPPED | OUTPATIENT
Start: 2024-03-28

## 2024-03-28 NOTE — PROGRESS NOTES
"Chief Complaint  Cough (Onset yesterday. Pt c/o chest tightness. Pt is concerned with bronchitis and pneumonia. Pt declines testing today. )    Opal Lyons presents to [facility] today for acute complaint of cough for the past 1 days.  She reports negative fatigue and malaise, negative fevers, negative chills, negative headaches, positive rhinorrhea, negative congestion, negative sore throat, negative changes in taste or smell.  She reports positive cough, productive of no sputum, negative chest pain, positive chest tightness, positive wheeze, negative shortness of breath.  She has negative abdominal pain, negative nausea, negative vomiting, negative diarrhea, negative body aches.  Sick contacts:  best friend with similar sx.  She does not smoke.  Negative flu vaccine, negative COVID vaccine for [id number].  She does have history of asthma for which she is on QVAR at baseline.  Also has albuterol MDI and Duonebs at home.      Objective   Vital Signs:   Vitals:    03/28/24 1036   BP: 137/77   Pulse: 75   Temp: 98.1 °F (36.7 °C)   TempSrc: Oral   SpO2: 97%  Comment: room air   Weight: 99.3 kg (219 lb)   Height: 170.2 cm (67.01\")            Physical Exam  Vitals reviewed.   Constitutional:       General: She is not in acute distress.     Appearance: Normal appearance.   HENT:      Right Ear: Tympanic membrane, ear canal and external ear normal.      Left Ear: Tympanic membrane, ear canal and external ear normal.      Nose: Congestion and rhinorrhea present.      Right Turbinates: Swollen and pale.      Left Turbinates: Swollen and pale.      Mouth/Throat:      Mouth: Mucous membranes are moist.      Pharynx: Posterior oropharyngeal erythema present.      Tonsils: No tonsillar exudate.   Eyes:      Extraocular Movements: Extraocular movements intact.      Pupils: Pupils are equal, round, and reactive to light.   Cardiovascular:      Rate and Rhythm: Normal rate and " regular rhythm.      Heart sounds: No murmur heard.  Pulmonary:      Effort: Pulmonary effort is normal. No respiratory distress.      Breath sounds: Normal breath sounds. No wheezing, rhonchi or rales.   Abdominal:      General: Bowel sounds are normal.      Tenderness: There is no abdominal tenderness.   Musculoskeletal:         General: Normal range of motion.   Neurological:      Mental Status: She is alert.                                               Assessment and Plan    Assessment & Plan  Acute cough  Viral URI.  Rapid COVID and flu testing were negative today.  No evidence of bacterial infection.  Start back on use of QVAR inhaler which she already has at home.  Rxs sent for Duonebs as well.  She has sufficient supply of albuterol MDI.  Monitor closely.  Symptomatic care recommended with OTC analgesics for pain/fever, OTC decongestants and cough suppressants prn.  Stay well hydrated.  Humidifier in the bedroom at night.  Hot tea with lemon and honey.  RTC prn worsening or failure to improve of sx.  We may plan to do a CXR PA/lateral if her symptoms worsen, as she does report a history of recurrent PNA with her asthma.  Mild intermittent asthma without complication    As above.                Follow Up   Return if symptoms worsen or fail to improve.  Patient was given instructions and counseling regarding her condition or for health maintenance advice. Please see specific information pulled into the AVS if appropriate.          03/28/2024

## 2024-04-02 ENCOUNTER — TELEPHONE (OUTPATIENT)
Dept: FAMILY MEDICINE CLINIC | Age: 34
End: 2024-04-02
Payer: COMMERCIAL

## 2024-04-02 NOTE — TELEPHONE ENCOUNTER
error    ----- Message from Keerthi Garnica LPN sent at 4/1/2024  8:12 AM EDT -----      ----- Message -----  From: SYSTEM  Sent: 3/30/2024   1:46 AM EDT  To: [facility]

## 2024-05-24 ENCOUNTER — OFFICE VISIT (OUTPATIENT)
Dept: FAMILY MEDICINE CLINIC | Age: 34
End: 2024-05-24
Payer: MEDICAID

## 2024-05-24 VITALS
DIASTOLIC BLOOD PRESSURE: 90 MMHG | SYSTOLIC BLOOD PRESSURE: 133 MMHG | BODY MASS INDEX: 35.41 KG/M2 | HEART RATE: 90 BPM | WEIGHT: 225.6 LBS | HEIGHT: 67 IN

## 2024-05-24 DIAGNOSIS — G43.419 INTRACTABLE HEMIPLEGIC MIGRAINE WITHOUT STATUS MIGRAINOSUS: Primary | ICD-10-CM

## 2024-05-24 DIAGNOSIS — F41.9 ANXIETY AND DEPRESSION: ICD-10-CM

## 2024-05-24 DIAGNOSIS — F32.A ANXIETY AND DEPRESSION: ICD-10-CM

## 2024-05-24 PROCEDURE — 1126F AMNT PAIN NOTED NONE PRSNT: CPT | Performed by: NURSE PRACTITIONER

## 2024-05-24 PROCEDURE — 99214 OFFICE O/P EST MOD 30 MIN: CPT | Performed by: NURSE PRACTITIONER

## 2024-05-24 RX ORDER — BUSPIRONE HYDROCHLORIDE 5 MG/1
5 TABLET ORAL 3 TIMES DAILY PRN
Qty: 90 TABLET | Refills: 2 | Status: SHIPPED | OUTPATIENT
Start: 2024-05-24

## 2024-05-24 RX ORDER — PREDNISONE 20 MG/1
TABLET ORAL
Qty: 19 TABLET | Refills: 0 | Status: SHIPPED | OUTPATIENT
Start: 2024-05-24 | End: 2024-06-03

## 2024-05-24 RX ORDER — TOPIRAMATE 25 MG/1
TABLET ORAL
Qty: 90 TABLET | Refills: 0 | Status: SHIPPED | OUTPATIENT
Start: 2024-05-24 | End: 2024-06-23

## 2024-05-24 NOTE — PROGRESS NOTES
"Dina Lyons presents to [facility] with complaint of  Migraine (Swelling in (R) eye )    SUBJECTIVE  History of Present Illness    Patient is here with migraine headache and right eye swelling.  Patient says she has had migraine for over 24 hours.  She took Maxalt twice yesterday and has taken 1 dose today.  She feels that Maxalt is not as effective as it used to be.  She is followed by neurology, Candis chamberlain.  Patient has bodily numbness and tingling whenever she experiences migraine.  She had nerve conduction study that confirmed carpal tunnel syndrome and she has had carpal tunnel release of both wrist has helped somewhat.  Patient says that her right eyelid swelling has improved over the day.  Described as being puffy, denies any eye irritation/redness/drainage/vision changes.  She has also needed BuSpar refill that she takes for anxiety.        OBJECTIVE  Vital Signs:   /90 (BP Location: Right arm, Patient Position: Sitting)   Pulse 90   Ht 170.2 cm (67.01\")   Wt 102 kg (225 lb 9.6 oz)   BMI 35.32 kg/m²       Physical Exam  Vitals reviewed.   Constitutional:       General: She is not in acute distress.     Appearance: Normal appearance. She is not ill-appearing.   HENT:      Head: Normocephalic and atraumatic.      Nose: Nose normal.      Mouth/Throat:      Mouth: Mucous membranes are moist.      Pharynx: Oropharynx is clear.   Eyes:      General: Lids are normal.         Right eye: No discharge.      Conjunctiva/sclera:      Right eye: Right conjunctiva is not injected. No hemorrhage.     Left eye: Left conjunctiva is not injected. No hemorrhage.     Pupils: Pupils are equal, round, and reactive to light.   Cardiovascular:      Rate and Rhythm: Normal rate and regular rhythm.      Pulses: Normal pulses.      Heart sounds: Normal heart sounds.   Pulmonary:      Effort: Pulmonary effort is normal.      Breath sounds: Normal breath sounds.   Musculoskeletal:      " Cervical back: Neck supple.   Skin:     General: Skin is warm and dry.   Neurological:      General: No focal deficit present.      Mental Status: She is alert and oriented to person, place, and time. Mental status is at baseline.   Psychiatric:         Mood and Affect: Mood normal.         Behavior: Behavior normal.         Judgment: Judgment normal.              ASSESSMENT AND PLAN:  Diagnoses and all orders for this visit:    1. Intractable hemiplegic migraine without status migrainosus (Primary)  -     predniSONE (DELTASONE) 20 MG tablet; Take 3 tablets by mouth Daily for 3 days, THEN 2 tablets Daily for 3 days, THEN 1 tablet Daily for 3 days, THEN 1/2 tablet Daily for 1 day.  Dispense: 19 tablet; Refill: 0  -     topiramate (Topamax) 25 MG tablet; Take 1 tablet by mouth every night at bedtime for 14 days, THEN increase to 2 tablets every night at bedtime  Dispense: 90 tablet; Refill: 0    2. Anxiety and depression  -     busPIRone (BUSPAR) 5 MG tablet; Take 1 tablet by mouth 3 (Three) Times a Day As Needed for anxiety. If one tablet is not effective may increase to 2 tablets 3 times daily  Dispense: 90 tablet; Refill: 2      Do not see anything overly concerning with her eye today.  Discussed with patient that this may be early symptoms of conjunctivitis, hordeolum, or viral etiology/shingles?  She was encouraged to monitor symptoms.  Discussed ER precautions regarding her eye.  May use cool compress in the meantime.  Eye swelling may also be related to acute migraine she is currently having.  Toradol injections are not effective for her, for this reason she was given prednisone taper.  She is having to use Maxalt pretty regularly and it is no longer effective most of the time, for this reason we will start preventative Topamax.  Has follow-up appointment with neurology in August.  Anxiety is well-controlled with BuSpar, refills provided.      Follow Up   Return if symptoms worsen or fail to improve. Patient to  notify office with any acute concerns or issues.  Patient verbalizes understanding, agrees with plan of care and has no further questions upon discharge.     Patient was given instructions and counseling regarding her condition or for health maintenance advice. Please see specific information pulled into the AVS if appropriate.     Discussed the importance of following up with any needed screening tests/labs/specialist appointments and any requested follow-up recommended by me today. Importance of maintaining follow-up discussed and patient accepts that missed appointments can delay diagnosis and potentially lead to worsening of conditions.    Part of this note may be an electronic transcription/translation of spoken language to printed text using the Dragon Dictation System.

## 2024-05-30 ENCOUNTER — TELEPHONE (OUTPATIENT)
Dept: NEUROLOGY | Facility: CLINIC | Age: 34
End: 2024-05-30

## 2024-05-30 NOTE — TELEPHONE ENCOUNTER
Caller: Dina Loyns    Relationship: Self    Best call back number: [phone]    What is the best time to reach you:     Who are you requesting to speak with (clinical staff, provider,  specific staff member): LACHO ARMAS    Do you know the name of the person who called:     What was the call regarding:   PT CALLING AGAIN TO SEE IF SHE WILL BE GIVEN ANOTHER RX OR IF SHE NEEDS A SOONER APPT.    PLEASE SEND MY CHART MESSAGE ON WHAT JOEY DECIDES.

## 2024-05-30 NOTE — TELEPHONE ENCOUNTER
Caller: Dina Lyons    Relationship: Self    Best call back number: [phone]     Which medication are you concerned about:   rizatriptan (Maxalt) 10 MG tablet     Who prescribed you this medication:   LACHO ARMAS    When did you start taking this medication:   AT LEAST 2 YEARS    What are your concerns:   THIS RX IS NO LONGER WORKING FOR THE PT. SHE IS TAKING THE MAX DOSE OF 2 PER DAY. PT IS WANTING TO KNOW IF THERE'S ANOTHER RX OPTION FOR HER. PT'S PHARMACY IS  [facility]Y IN [place].    PT HAS BEEN HAVING MIGRAINES NOW FOR ABOUT 2 WEEKS. PT HAD TO MISS WORK TODAY DUE TO THE MIGRAINE.    LIGHT AND NOISE MAKE IT WORSE.    PT HAS TRIED TOPAMAX AS WELL AND THIS ISN'T HELPING EITHER.    PAIN IS IN HER FOREHEAD AND ITS FEELING LIKE A SWELLING NUMBNESS. PT'S PAIN LEVEL IS ABOUT A 4.    PT'S LEFT HAND IS ALSO TINGLING AS WELL.    PLEASE CALL PT TO DISCUSS OTHER RX OPTIONS.

## 2024-05-30 NOTE — TELEPHONE ENCOUNTER
"Spoke with pt on the phone. She stated that she had a day or two last week with no HA and over the weekend HA was not too bad. Last night pt stated she started with a bad HA.  Feels like she has a \"halo around her head and her face feels swollen\" Saw PCP last week for same concern and was started on Topamax.  Stated neither topamax or maxalt working. Tried OTC's with no effect. Instructed pt to avoid OTC's because can worsen symptoms. Pt verbalized understanding. Is asking if there is an alternative med instead of maxalt. Would come in for sooner appt if needed. Pt stated she is miserable.   "

## 2024-05-31 ENCOUNTER — PATIENT MESSAGE (OUTPATIENT)
Dept: NEUROLOGY | Facility: CLINIC | Age: 34
End: 2024-05-31
Payer: MEDICAID

## 2024-05-31 NOTE — TELEPHONE ENCOUNTER
Caller: Dina Lyons    Relationship: Self    Best call back number: [phone]    Who are you requesting to speak with (clinical staff, provider,  specific staff member): PROVIDER    Do you know the name of the person: JAMES PARKER    What was the call regarding: PATIENT CANNOT LIFT HER HEAD OFF OF THE PILLOW AND HAS HAD TO MISS A SECOND DAY OF WORK. PLEASE CALL HER TODAY WITH ANY OPTIONS FOR THIS MIGRAINE.    Is it okay if the provider responds through [employer]: YES

## 2024-05-31 NOTE — TELEPHONE ENCOUNTER
I was going to send in a medrol dosepack but it looks like she was prescribed a prednisone taper on 5/24? Is she sick with something other than migraine?

## 2024-06-07 DIAGNOSIS — G43.419 INTRACTABLE HEMIPLEGIC MIGRAINE WITHOUT STATUS MIGRAINOSUS: ICD-10-CM

## 2024-06-07 RX ORDER — PREDNISONE 20 MG/1
TABLET ORAL
Qty: 19 TABLET | Refills: 0 | OUTPATIENT
Start: 2024-06-07 | End: 2024-06-16

## 2024-06-27 ENCOUNTER — OFFICE VISIT (OUTPATIENT)
Dept: FAMILY MEDICINE CLINIC | Age: 34
End: 2024-06-27
Payer: COMMERCIAL

## 2024-06-27 VITALS — BODY MASS INDEX: 36.03 KG/M2 | WEIGHT: 229.6 LBS | HEIGHT: 67 IN

## 2024-06-27 DIAGNOSIS — R35.0 FREQUENT URINATION: ICD-10-CM

## 2024-06-27 DIAGNOSIS — N39.0 ACUTE UTI: Primary | ICD-10-CM

## 2024-06-27 LAB
BACTERIA UR QL AUTO: ABNORMAL /HPF
BILIRUB UR QL STRIP: NEGATIVE
CLARITY UR: ABNORMAL
COLOR UR: YELLOW
GLUCOSE UR STRIP-MCNC: NEGATIVE MG/DL
HGB UR QL STRIP.AUTO: ABNORMAL
KETONES UR QL STRIP: NEGATIVE
LEUKOCYTE ESTERASE UR QL STRIP.AUTO: ABNORMAL
NITRITE UR QL STRIP: NEGATIVE
PH UR STRIP.AUTO: 7 [PH] (ref 5–8)
PROT UR QL STRIP: NEGATIVE
RBC # UR STRIP: ABNORMAL /HPF
REF LAB TEST METHOD: ABNORMAL
SP GR UR STRIP: 1.02 (ref 1–1.03)
SQUAMOUS #/AREA URNS HPF: ABNORMAL /HPF
UROBILINOGEN UR QL STRIP: ABNORMAL
WBC # UR STRIP: ABNORMAL /HPF

## 2024-06-27 PROCEDURE — 87086 URINE CULTURE/COLONY COUNT: CPT | Performed by: PHYSICIAN ASSISTANT

## 2024-06-27 PROCEDURE — 81001 URINALYSIS AUTO W/SCOPE: CPT | Performed by: PHYSICIAN ASSISTANT

## 2024-06-27 PROCEDURE — 99213 OFFICE O/P EST LOW 20 MIN: CPT | Performed by: PHYSICIAN ASSISTANT

## 2024-06-27 PROCEDURE — 87088 URINE BACTERIA CULTURE: CPT | Performed by: PHYSICIAN ASSISTANT

## 2024-06-27 PROCEDURE — 87186 SC STD MICRODIL/AGAR DIL: CPT | Performed by: PHYSICIAN ASSISTANT

## 2024-06-27 RX ORDER — NITROFURANTOIN 25; 75 MG/1; MG/1
100 CAPSULE ORAL 2 TIMES DAILY
Qty: 10 CAPSULE | Refills: 0 | Status: SHIPPED | OUTPATIENT
Start: 2024-06-27 | End: 2024-07-02

## 2024-06-27 RX ORDER — PSEUDOEPHEDRINE HCL 30 MG
100 TABLET ORAL
COMMUNITY
Start: 2024-01-02

## 2024-06-27 NOTE — LETTER
June 27, 2024     Patient: Dina Lyons   YOB: 1990   Date of Visit: 6/27/2024       To Whom It May Concern:    It is my medical opinion that Dina Lyons may return to work on 6/28/2024.            Sincerely,          Marissa Chairez PA-C    CC: No Recipients

## 2024-06-27 NOTE — PROGRESS NOTES
"Subjective     CHIEF COMPLAINT    Chief Complaint   Patient presents with    Urinary Tract Infection     Pt c/o pressure when urinating, frequent urination, urine has odor.              History of Present Illness  This is a 33-year-old female presenting to clinic complaining of urinary frequency and urgency for the last 4 days.  She has also noted a \"strong odor\" to her urine.  Denies any fevers or chills but did have some nausea this morning.  She has not had any flank pain, hematuria or vaginal discharge.  Denies any concern for STI.  She has not taken any over-the-counter treatments.            Review of Systems   Constitutional:  Negative for chills and fever.   Gastrointestinal:  Positive for abdominal pain (\"pressure\") and nausea. Negative for vomiting.   Genitourinary:  Positive for frequency and urgency. Negative for dysuria, flank pain, hematuria, vaginal bleeding and vaginal discharge.   Musculoskeletal:  Negative for back pain.            Past Medical History:   Diagnosis Date    Abscess of right thigh 12/15/2022    Anxiety and depression     Bronchitis 2022    Carpal tunnel syndrome     Family history of complication of anesthesia     MOTHER HAS SEVERE NAUSEA    History of migraine     Left sided numbness 2022    Left-sided weakness 2022    Migraine     Ovarian cyst     RIGHT    Ovarian tumor (benign), left     Pyelonephritis     Seasonal asthma             Past Surgical History:   Procedure Laterality Date    ADENOIDECTOMY      APPENDECTOMY  2006    CARPAL TUNNEL RELEASE Left 2024    Procedure: LEFT CARPAL TUNNEL RELEASE;  Surgeon: Vincent Willett MD;  [place] [facility] [place] [facility] [place]  Service: Orthopedics;  Laterality: Left;    CARPAL TUNNEL RELEASE Right 2024    Procedure: CARPAL TUNNEL RELEASE;  Surgeon: Vincent Willett MD;  Location: [facility] [place] [facility] [place];  Service: Orthopedics;  Laterality: Right;     SECTION      X1    CHOLECYSTECTOMY  2006    DIAGNOSTIC LAPAROSCOPY Left " 04/29/2021    Procedure: SALPINGO OOPHORECTOMY  LAPAROSCOPY, LYSIS OF ADHESIONS;  Surgeon: Don Proctor MD;  [place] [facility] [place] [facility] [place]  Service: Obstetrics/Gynecology;  Laterality: Left;    FLAT FOOT RECONSTRUCTION Bilateral 2006 2007    FOOT SURGERY Bilateral     LAPAROTOMY SALPINGO OOPHORECTOMY Left 04/29/2021    OVARY SURGERY Left     TO REMOVE BENIGN OVARIAN TUMOR            Family History   Problem Relation Age of Onset    No Known Problems Father     No Known Problems Mother     Malig Hyperthermia Neg Hx             Social History     Socioeconomic History    Marital status: Single   Tobacco Use    Smoking status: Former     Current packs/day: 0.00     Average packs/day: 0.5 packs/day for 15.0 years (7.5 ttl pk-yrs)     Types: Cigarettes     Start date: 4/8/2006     Quit date: 4/8/2021     Years since quitting: 3.2     Passive exposure: Past    Smokeless tobacco: Never    Tobacco comments:     none in the past 4 weeks   Vaping Use    Vaping status: Every Day    Substances: Nicotine, Flavoring    Devices: Pre-filled or refillable cartridge, Refillable tank   Substance and Sexual Activity    Alcohol use: Yes     Comment: occasionally    Drug use: Never    Sexual activity: Defer            Allergies   Allergen Reactions    Codeine Nausea And Vomiting    Contrast Dye (Echo Or Unknown Ct/Mr) Hives     Patient has tolerated contrast dye when pretreated with benadryl    Keflex [Cephalexin] Hives    Levaquin [Levofloxacin] Swelling    Penicillins Hives    Sulfa Antibiotics Hives            Current Outpatient Medications on File Prior to Visit   Medication Sig Dispense Refill    acetaminophen (TYLENOL) 500 MG tablet Take 1-2 tablets by mouth Every 6 (Six) Hours As Needed for Mild Pain.      albuterol sulfate  (90 Base) MCG/ACT inhaler Inhale 2 puffs Every 4 (Four) Hours As Needed for Wheezing or Shortness of Air (chest tightness). 6.7 g 0    Beclomethasone Diprop HFA (QVAR Redihaler) 40 MCG/ACT inhaler  "Inhale 1 puff 2 (Two) Times a Day. 10.6 g 0    busPIRone (BUSPAR) 5 MG tablet Take 1 tablet by mouth 3 (Three) Times a Day As Needed for anxiety. If one tablet is not effective may increase to 2 tablets 3 times daily 90 tablet 2    docusate sodium 100 MG capsule Take 1 capsule by mouth.      ibuprofen (ADVIL,MOTRIN) 600 MG tablet Take 1 tablet by mouth Every 8 (Eight) Hours As Needed for Pain for up to 12 days. 12 tablet 0    ipratropium-albuterol (DUO-NEB) 0.5-2.5 mg/3 ml nebulizer Inhale 1 vial by nebulization Every 6 (Six) Hours As Needed for wheezing 180 mL 0    rizatriptan (Maxalt) 10 MG tablet Take 1 tablet by mouth 1 (One) Time As Needed for Migraine. May repeat in 2 hours if needed 12 tablet 5    [DISCONTINUED] topiramate (Topamax) 25 MG tablet Take 1 tablet by mouth every night at bedtime for 14 days, THEN increase to 2 tablets every night at bedtime 90 tablet 0     No current facility-administered medications on file prior to visit.            Ht 170.2 cm (67.01\")   Wt 104 kg (229 lb 9.6 oz)   BMI 35.95 kg/m²          Objective     Physical Exam  Vitals and nursing note reviewed.   Constitutional:       Appearance: Normal appearance.   Cardiovascular:      Rate and Rhythm: Normal rate and regular rhythm.      Heart sounds: Normal heart sounds.   Pulmonary:      Effort: Pulmonary effort is normal.      Breath sounds: Normal breath sounds.   Abdominal:      General: There is no distension.      Palpations: Abdomen is soft.      Tenderness: There is no abdominal tenderness. There is no right CVA tenderness, left CVA tenderness or guarding.   Skin:     General: Skin is warm and dry.      Findings: No rash.   Neurological:      Mental Status: She is alert and oriented to person, place, and time.   Psychiatric:         Mood and Affect: Mood normal.         Behavior: Behavior normal.             Lab Results (last 24 hours)       Procedure Component Value Units Date/Time    Urinalysis With Microscopic If " Indicated (No Culture) - Urine, Clean Catch [[id number]]  (Abnormal) Collected: 06/27/24 1333    Specimen: Urine, Clean Catch Updated: 06/27/24 1342     Color, UA Yellow     Appearance, UA Slightly Cloudy     pH, UA 7.0     Specific Gravity, UA 1.020     Glucose, UA Negative     Ketones, UA Negative     Bilirubin, UA Negative     Blood, UA Small (1+)     Protein, UA Negative     Leuk Esterase, UA Trace     Nitrite, UA Negative     Urobilinogen, UA 0.2 E.U./dL    Urinalysis, Microscopic Only - Urine, Clean Catch [[id number]]  (Abnormal) Collected: 06/27/24 1333    Specimen: Urine, Clean Catch Updated: 06/27/24 1351     RBC, UA 3-5 /HPF      WBC, UA 11-20 /HPF      Bacteria, UA 2+ /HPF      Squamous Epithelial Cells, UA 0-2 /HPF      Methodology Manual Light Microscopy    Urine Culture - Urine, Urine, Clean Catch [[id number]] Collected: 06/27/24 1333    Specimen: Urine, Clean Catch Updated: 06/27/24 1355            Assessment & Plan  Acute UTI  Urinalysis and patient presentation are concerning for bacterial urinary tract infection.  Will treat with antibiotics as below.  Urine culture pending and plan to target treatment based on results.  Patient advised to take antibiotics until they are completed and to take antibiotics with food.  Consider eating yogurt or taking probiotic to help avoid yeast infection after antibiotics.  Cautioned to go to emergency department for fever, chills, flank pain, nausea or vomiting.  Frequent urination      Orders Placed This Encounter   Procedures    Urine Culture - Urine, Urine, Clean Catch    Urinalysis With Microscopic If Indicated (No Culture) - Urine, Clean Catch    Urinalysis, Microscopic Only - Urine, Clean Catch     New Medications Ordered This Visit   Medications    nitrofurantoin, macrocrystal-monohydrate, (Macrobid) 100 MG capsule     Sig: Take 1 capsule by mouth 2 (Two) Times a Day for 5 days.     Dispense:  10 capsule     Refill:  0                FOR FULL DISCHARGE  INSTRUCTIONS/COMMENTS/HANDOUTS please see the   AVS

## 2024-06-29 LAB — BACTERIA SPEC AEROBE CULT: ABNORMAL

## 2024-08-20 ENCOUNTER — OFFICE VISIT (OUTPATIENT)
Dept: NEUROLOGY | Facility: CLINIC | Age: 34
End: 2024-08-20
Payer: COMMERCIAL

## 2024-08-20 VITALS
HEIGHT: 67 IN | WEIGHT: 229.4 LBS | DIASTOLIC BLOOD PRESSURE: 76 MMHG | HEART RATE: 88 BPM | SYSTOLIC BLOOD PRESSURE: 122 MMHG | BODY MASS INDEX: 36 KG/M2

## 2024-08-20 DIAGNOSIS — G43.411 INTRACTABLE HEMIPLEGIC MIGRAINE WITH STATUS MIGRAINOSUS: Primary | ICD-10-CM

## 2024-08-20 RX ORDER — RIMEGEPANT SULFATE 75 MG/75MG
75 TABLET, ORALLY DISINTEGRATING ORAL DAILY PRN
Qty: 8 TABLET | Refills: 5 | Status: SHIPPED | OUTPATIENT
Start: 2024-08-20

## 2024-08-20 RX ORDER — ATOGEPANT 60 MG/1
60 TABLET ORAL DAILY
Qty: 30 TABLET | Refills: 5 | Status: SHIPPED | OUTPATIENT
Start: 2024-08-20

## 2024-08-20 RX ORDER — TOPIRAMATE SPINKLE 25 MG/1
25 CAPSULE ORAL 2 TIMES DAILY
COMMUNITY
End: 2024-08-20

## 2024-08-20 NOTE — PROGRESS NOTES
"Chief Complaint  Neurologic Problem    Opal Lyons presents to [facility]  History of Present Illness    History of Present Illness  The patient is a 33-year-old female with a history of hemiplegic migraine with status migraine who is following up. She has typically used Maxalt as needed for abortive therapy but does not feel like it is working as well as it had once.    She reports experiencing severe headaches daily, a pattern that began a few months ago. In April 2024, she was prescribed Topamax and steroids by her primary care physician to enhance the effectiveness of Maxalt. However, this combination did not yield the desired results, leading her to discontinue the use of Topamax.     Previous preventative migraine medications: amitriptyline (drowsiness), topiramate  Previous abortive migraine medications: imitrex, maxalt,     Objective   Vital Signs:   /76   Pulse 88   Ht 170.2 cm (67\")   Wt 104 kg (229 lb 6.4 oz)   BMI 35.93 kg/m²     Physical Exam  HENT:      Head: Normocephalic.   Pulmonary:      Effort: Pulmonary effort is normal.   Neurological:      Mental Status: She is alert and oriented to person, place, and time.      Sensory: Sensation is intact.      Motor: Motor function is intact.      Coordination: Coordination is intact.      Deep Tendon Reflexes: Reflexes are normal and symmetric.        Neurologic Exam     Mental Status   Oriented to person, place, and time.        Result Review :               Assessment and Plan    Diagnoses and all orders for this visit:    1. Intractable hemiplegic migraine with status migrainosus (Primary)    Other orders  -     Atogepant (Qulipta) 60 MG tablet; Take 1 tablet by mouth Daily.  Dispense: 30 tablet; Refill: 5  -     Rimegepant Sulfate (Nurtec) 75 MG tablet dispersible tablet; Take 1 tablet by mouth Daily As Needed (migraine).  Dispense: 8 tablet; Refill: 5        Assessment & Plan  1. " Hemiplegic migraine with status migraine.  She reports that her current abortive therapy, Maxalt, is no longer effective. A preventative therapy with Qulipta 1 tablet/day will be initiated, which is well-tolerated with minimal side effects. Maxalt will be discontinued, and Nurtec 1 tablet/day as needed, which dissolves under the tongue and has minimal side effects, will be started as the new abortive therapy. It may take approximately 6 weeks for Qulipta to show noticeable improvements. Prior authorization for both medications will be required, and the staff will assist with this process. If the medications are not available at the pharmacy within a week, she should contact us via [employer]. She is advised to reach out if any issues arise.           Follow Up   Return in about 4 months (around 12/20/2024) for Migraine f/u.  Patient was given instructions and counseling regarding her condition or for health maintenance advice. Please see specific information pulled into the AVS if appropriate.       Patient or patient representative verbalized consent for the use of Ambient Listening during the visit with  JAMES Dumont for chart documentation. 8/20/2024  10:10 EDT

## 2024-08-21 ENCOUNTER — PRIOR AUTHORIZATION (OUTPATIENT)
Dept: NEUROLOGY | Facility: CLINIC | Age: 34
End: 2024-08-21
Payer: COMMERCIAL

## 2024-08-21 NOTE — TELEPHONE ENCOUNTER
PA submitted through CM  Approved today by [facility] 2017  Your PA request has been approved. Additional information will be provided in the approval communication.

## 2024-08-23 ENCOUNTER — OFFICE VISIT (OUTPATIENT)
Dept: FAMILY MEDICINE CLINIC | Age: 34
End: 2024-08-23
Payer: COMMERCIAL

## 2024-08-23 ENCOUNTER — LAB (OUTPATIENT)
Dept: LAB | Facility: HOSPITAL | Age: 34
End: 2024-08-23
Payer: COMMERCIAL

## 2024-08-23 VITALS
SYSTOLIC BLOOD PRESSURE: 129 MMHG | OXYGEN SATURATION: 97 % | TEMPERATURE: 98.2 F | WEIGHT: 233 LBS | DIASTOLIC BLOOD PRESSURE: 88 MMHG | HEART RATE: 79 BPM | HEIGHT: 67 IN | BODY MASS INDEX: 36.57 KG/M2

## 2024-08-23 DIAGNOSIS — R82.90 ABNORMAL URINE ODOR: ICD-10-CM

## 2024-08-23 DIAGNOSIS — R32 URINARY INCONTINENCE, UNSPECIFIED TYPE: ICD-10-CM

## 2024-08-23 DIAGNOSIS — R19.7 DIARRHEA, UNSPECIFIED TYPE: ICD-10-CM

## 2024-08-23 DIAGNOSIS — R11.0 NAUSEA: ICD-10-CM

## 2024-08-23 DIAGNOSIS — M54.50 ACUTE MIDLINE LOW BACK PAIN WITHOUT SCIATICA: Primary | ICD-10-CM

## 2024-08-23 LAB
ALBUMIN SERPL-MCNC: 4.2 G/DL (ref 3.5–5.2)
ALBUMIN/GLOB SERPL: 1.7 G/DL
ALP SERPL-CCNC: 64 U/L (ref 39–117)
ALT SERPL W P-5'-P-CCNC: 36 U/L (ref 1–33)
ANION GAP SERPL CALCULATED.3IONS-SCNC: 8.6 MMOL/L (ref 5–15)
AST SERPL-CCNC: 25 U/L (ref 1–32)
B-HCG UR QL: NEGATIVE
BACTERIA UR QL AUTO: ABNORMAL /HPF
BASOPHILS # BLD AUTO: 0.03 10*3/MM3 (ref 0–0.2)
BASOPHILS NFR BLD AUTO: 0.8 % (ref 0–1.5)
BILIRUB SERPL-MCNC: 0.8 MG/DL (ref 0–1.2)
BILIRUB UR QL STRIP: NEGATIVE
BUN SERPL-MCNC: 9 MG/DL (ref 6–20)
BUN/CREAT SERPL: 11.7 (ref 7–25)
CALCIUM SPEC-SCNC: 9 MG/DL (ref 8.6–10.5)
CHLORIDE SERPL-SCNC: 107 MMOL/L (ref 98–107)
CLARITY UR: CLEAR
CO2 SERPL-SCNC: 25.4 MMOL/L (ref 22–29)
COLOR UR: YELLOW
CREAT SERPL-MCNC: 0.77 MG/DL (ref 0.57–1)
DEPRECATED RDW RBC AUTO: 41.5 FL (ref 37–54)
EGFRCR SERPLBLD CKD-EPI 2021: 104 ML/MIN/1.73
EOSINOPHIL # BLD AUTO: 0.09 10*3/MM3 (ref 0–0.4)
EOSINOPHIL NFR BLD AUTO: 2.3 % (ref 0.3–6.2)
ERYTHROCYTE [DISTWIDTH] IN BLOOD BY AUTOMATED COUNT: 12.6 % (ref 12.3–15.4)
EXPIRATION DATE: NORMAL
FLUAV AG UPPER RESP QL IA.RAPID: NOT DETECTED
FLUBV AG UPPER RESP QL IA.RAPID: NOT DETECTED
GLOBULIN UR ELPH-MCNC: 2.5 GM/DL
GLUCOSE SERPL-MCNC: 91 MG/DL (ref 65–99)
GLUCOSE UR STRIP-MCNC: NEGATIVE MG/DL
HCT VFR BLD AUTO: 41 % (ref 34–46.6)
HGB BLD-MCNC: 13.5 G/DL (ref 12–15.9)
HGB UR QL STRIP.AUTO: NEGATIVE
IMM GRANULOCYTES # BLD AUTO: 0.01 10*3/MM3 (ref 0–0.05)
IMM GRANULOCYTES NFR BLD AUTO: 0.3 % (ref 0–0.5)
INTERNAL CONTROL: NORMAL
KETONES UR QL STRIP: NEGATIVE
LEUKOCYTE ESTERASE UR QL STRIP.AUTO: NEGATIVE
LIPASE SERPL-CCNC: 27 U/L (ref 13–60)
LYMPHOCYTES # BLD AUTO: 0.69 10*3/MM3 (ref 0.7–3.1)
LYMPHOCYTES NFR BLD AUTO: 17.4 % (ref 19.6–45.3)
Lab: NORMAL
MCH RBC QN AUTO: 29.1 PG (ref 26.6–33)
MCHC RBC AUTO-ENTMCNC: 32.9 G/DL (ref 31.5–35.7)
MCV RBC AUTO: 88.4 FL (ref 79–97)
MONOCYTES # BLD AUTO: 0.48 10*3/MM3 (ref 0.1–0.9)
MONOCYTES NFR BLD AUTO: 12.1 % (ref 5–12)
NEUTROPHILS NFR BLD AUTO: 2.66 10*3/MM3 (ref 1.7–7)
NEUTROPHILS NFR BLD AUTO: 67.1 % (ref 42.7–76)
NITRITE UR QL STRIP: NEGATIVE
PH UR STRIP.AUTO: 6.5 [PH] (ref 5–8)
PLATELET # BLD AUTO: 185 10*3/MM3 (ref 140–450)
PMV BLD AUTO: 9.1 FL (ref 6–12)
POTASSIUM SERPL-SCNC: 4.1 MMOL/L (ref 3.5–5.2)
PROT SERPL-MCNC: 6.7 G/DL (ref 6–8.5)
PROT UR QL STRIP: NEGATIVE
RBC # BLD AUTO: 4.64 10*6/MM3 (ref 3.77–5.28)
RBC # UR STRIP: ABNORMAL /HPF
REF LAB TEST METHOD: ABNORMAL
SARS-COV-2 AG UPPER RESP QL IA.RAPID: NOT DETECTED
SODIUM SERPL-SCNC: 141 MMOL/L (ref 136–145)
SP GR UR STRIP: >=1.03 (ref 1–1.03)
SQUAMOUS #/AREA URNS HPF: ABNORMAL /HPF
UROBILINOGEN UR QL STRIP: NORMAL
WBC # UR STRIP: ABNORMAL /HPF
WBC NRBC COR # BLD AUTO: 3.96 10*3/MM3 (ref 3.4–10.8)

## 2024-08-23 PROCEDURE — 87428 SARSCOV & INF VIR A&B AG IA: CPT

## 2024-08-23 PROCEDURE — 80053 COMPREHEN METABOLIC PANEL: CPT

## 2024-08-23 PROCEDURE — 87591 N.GONORRHOEAE DNA AMP PROB: CPT

## 2024-08-23 PROCEDURE — 85025 COMPLETE CBC W/AUTO DIFF WBC: CPT

## 2024-08-23 PROCEDURE — 87661 TRICHOMONAS VAGINALIS AMPLIF: CPT

## 2024-08-23 PROCEDURE — 87086 URINE CULTURE/COLONY COUNT: CPT

## 2024-08-23 PROCEDURE — 87491 CHLMYD TRACH DNA AMP PROBE: CPT

## 2024-08-23 PROCEDURE — 83690 ASSAY OF LIPASE: CPT

## 2024-08-23 PROCEDURE — 81025 URINE PREGNANCY TEST: CPT

## 2024-08-23 PROCEDURE — 81001 URINALYSIS AUTO W/SCOPE: CPT

## 2024-08-23 PROCEDURE — 36415 COLL VENOUS BLD VENIPUNCTURE: CPT

## 2024-08-23 PROCEDURE — 99213 OFFICE O/P EST LOW 20 MIN: CPT

## 2024-08-23 NOTE — PROGRESS NOTES
Subjective     CHIEF COMPLAINT    Chief Complaint   Patient presents with    Nausea     Onset yesterday am.     Chills     Onset last night.     Back Pain     X 4 days. Pt states foul odor with urine.        History of Present Illness  Patient is a 33-year-old female, presenting to the clinic today with complaints of nausea, chills, diarrhea and back pain.  Back pain began about 4 days ago.  Her nausea, chills and diarrhea started yesterday.  She also states her urine has a foul odor but denies any other urinary complaints.  She has not had a fever.  She states that she thinks she might have a UTI as this is how her symptoms typically present.  Denies any cough, shortness of breath, wheezing or chest pain.      Additionally, she notes that she has some urinary leakage whenever she sneezes or coughs.  This has been present since she had her last child.      Review of Systems   Constitutional:  Positive for chills. Negative for fever.   HENT:  Negative for sneezing.    Respiratory:  Negative for cough, shortness of breath and wheezing.    Cardiovascular:  Negative for chest pain.   Gastrointestinal:  Positive for diarrhea and nausea. Negative for abdominal pain, blood in stool, constipation and vomiting.   Genitourinary:  Negative for urgency.        +foul odor to urine            Past Medical History:   Diagnosis Date    Abscess of right thigh 12/15/2022    Anxiety and depression     Bronchitis 03/31/2022    Carpal tunnel syndrome     Family history of complication of anesthesia     MOTHER HAS SEVERE NAUSEA    History of migraine     Left sided numbness 07/05/2022    Left-sided weakness 07/05/2022    Migraine     Ovarian cyst     RIGHT    Ovarian tumor (benign), left     Pyelonephritis     Seasonal asthma             Past Surgical History:   Procedure Laterality Date    ADENOIDECTOMY      APPENDECTOMY  2006    CARPAL TUNNEL RELEASE Left 1/2/2024    Procedure: LEFT CARPAL TUNNEL RELEASE;  Surgeon: Vincent Willett MD;   [facility]: [facility];  Service: Orthopedics;  Laterality: Left;    CARPAL TUNNEL RELEASE Right 2024    Procedure: CARPAL TUNNEL RELEASE;  Surgeon: Vincent Willett MD;  Location: [facility];  Service: Orthopedics;  Laterality: Right;     SECTION      X1    CHOLECYSTECTOMY  2006    DIAGNOSTIC LAPAROSCOPY Left 2021    Procedure: SALPINGO OOPHORECTOMY  LAPAROSCOPY, LYSIS OF ADHESIONS;  Surgeon: Don Proctor MD;  Location: [facility] OR [facility];  Service: Obstetrics/Gynecology;  Laterality: Left;    FLAT FOOT RECONSTRUCTION Bilateral  2007    FOOT SURGERY Bilateral     LAPAROTOMY SALPINGO OOPHORECTOMY Left 2021    OVARY SURGERY Left     TO REMOVE BENIGN OVARIAN TUMOR            Family History   Problem Relation Age of Onset    No Known Problems Father     No Known Problems Mother     Malig Hyperthermia Neg Hx             Social History     Socioeconomic History    Marital status: Single   Tobacco Use    Smoking status: Former     Current packs/day: 0.00     Average packs/day: 0.5 packs/day for 15.0 years (7.5 ttl pk-yrs)     Types: Cigarettes     Start date: 2006     Quit date: 2021     Years since quitting: 3.3     Passive exposure: Past    Smokeless tobacco: Never    Tobacco comments:     none in the past 4 weeks   Vaping Use    Vaping status: Every Day    Substances: Nicotine, Flavoring    Devices: Pre-filled or refillable cartridge, Refillable tank   Substance and Sexual Activity    Alcohol use: Yes     Comment: occasionally    Drug use: Never    Sexual activity: Defer            Allergies   Allergen Reactions    Codeine Nausea And Vomiting    Contrast Dye (Echo Or Unknown Ct/Mr) Hives     Patient has tolerated contrast dye when pretreated with benadryl    Keflex [Cephalexin] Hives    Levaquin [Levofloxacin] Swelling    Penicillins Hives    Sulfa Antibiotics Hives            Current Outpatient Medications on File Prior to Visit   Medication Sig Dispense Refill    acetaminophen  "(TYLENOL) 500 MG tablet Take 1-2 tablets by mouth Every 6 (Six) Hours As Needed for Mild Pain.      albuterol sulfate  (90 Base) MCG/ACT inhaler Inhale 2 puffs Every 4 (Four) Hours As Needed for Wheezing or Shortness of Air (chest tightness). 6.7 g 0    Beclomethasone Diprop HFA (QVAR Redihaler) 40 MCG/ACT inhaler Inhale 1 puff 2 (Two) Times a Day. 10.6 g 0    busPIRone (BUSPAR) 5 MG tablet Take 1 tablet by mouth 3 (Three) Times a Day As Needed for anxiety. If one tablet is not effective may increase to 2 tablets 3 times daily 90 tablet 2    docusate sodium 100 MG capsule Take 1 capsule by mouth.      ibuprofen (ADVIL,MOTRIN) 600 MG tablet Take 1 tablet by mouth Every 8 (Eight) Hours As Needed for Pain for up to 12 days. 12 tablet 0    ipratropium-albuterol (DUO-NEB) 0.5-2.5 mg/3 ml nebulizer Inhale 1 vial by nebulization Every 6 (Six) Hours As Needed for wheezing 180 mL 0    Rimegepant Sulfate (Nurtec) 75 MG tablet dispersible tablet Take 1 tablet by mouth Daily As Needed (migraine). 8 tablet 5    Atogepant (Qulipta) 60 MG tablet Take 1 tablet by mouth Daily. (Patient not taking: Reported on 8/23/2024) 30 tablet 5     No current facility-administered medications on file prior to visit.     /88 Comment: recheck  Pulse 79   Temp 98.2 °F (36.8 °C) (Oral)   Ht 170.2 cm (67.01\")   Wt 106 kg (233 lb)   SpO2 97% Comment: room air  BMI 36.48 kg/m²     Objective     Physical Exam  Vitals and nursing note reviewed.   Constitutional:       General: She is not in acute distress.     Appearance: Normal appearance. She is not ill-appearing.   Cardiovascular:      Rate and Rhythm: Normal rate and regular rhythm.      Heart sounds: Normal heart sounds. No murmur heard.  Pulmonary:      Effort: Pulmonary effort is normal. No respiratory distress.      Breath sounds: Normal breath sounds. No wheezing or rhonchi.   Abdominal:      General: Bowel sounds are normal. There is no distension.      Palpations: Abdomen is " soft.      Tenderness: There is no abdominal tenderness. There is no right CVA tenderness, left CVA tenderness, guarding or rebound.   Neurological:      General: No focal deficit present.      Mental Status: She is alert and oriented to person, place, and time.   Psychiatric:         Mood and Affect: Mood and affect normal.         Behavior: Behavior normal.            Assessment & Plan  Acute midline low back pain without sciatica    Nausea    Abnormal urine odor    Diarrhea, unspecified type    Urinary incontinence, unspecified type      Urine does not look concerning for UTI at this time, will send for culture to confirm.  Pregnancy test negative.  We will check some labs as noted to evaluate for any acute concerns.  She is not acutely ill on exam.  We will also check a chlamydia, gonorrhea and trichomonas test.  Patient declines any additional STD testing.  Advised to avoid sexual intercourse until she knows the results of her testing. Possibly viral illness. Symptomatic treatment discussed. Return and ER precautions advised.     Regarding her urinary leakage, sending referral to pelvic floor PT.       Orders Placed This Encounter   Procedures    Urine Culture - Urine, Urine, Clean Catch    Chlamydia trachomatis, Neisseria gonorrhoeae, Trichomonas vaginalis, PCR - Urine, Urine, Random Void    Urinalysis without microscopic (no culture) - Urine, Clean Catch    Urinalysis, Microscopic Only - Urine, Clean Catch    Pregnancy, Urine - Urine, Clean Catch    Comprehensive metabolic panel    Lipase    Ambulatory Referral to Physical Therapy for Evaluation & Treatment    POCT SARS-CoV-2 + Flu Antigen NIRANJAN    Urinalysis With Microscopic - Urine, Clean Catch    CBC w AUTO Differential          Follow up:  Return if symptoms worsen or fail to improve.  Patient was given instructions and counseling regarding her condition or for health maintenance advice. Please see specific information pulled into the AVS if appropriate.

## 2024-08-25 LAB — BACTERIA SPEC AEROBE CULT: NORMAL

## 2024-08-26 ENCOUNTER — LAB (OUTPATIENT)
Dept: LAB | Facility: HOSPITAL | Age: 34
End: 2024-08-26
Payer: COMMERCIAL

## 2024-08-26 ENCOUNTER — PATIENT MESSAGE (OUTPATIENT)
Dept: FAMILY MEDICINE CLINIC | Age: 34
End: 2024-08-26
Payer: COMMERCIAL

## 2024-08-26 DIAGNOSIS — M54.50 ACUTE MIDLINE LOW BACK PAIN WITHOUT SCIATICA: Primary | ICD-10-CM

## 2024-08-26 DIAGNOSIS — M54.50 ACUTE MIDLINE LOW BACK PAIN WITHOUT SCIATICA: ICD-10-CM

## 2024-08-26 LAB
BACTERIA UR QL AUTO: ABNORMAL /HPF
BILIRUB UR QL STRIP: NEGATIVE
C TRACH RRNA SPEC QL NAA+PROBE: NEGATIVE
CLARITY UR: CLEAR
COLOR UR: YELLOW
GLUCOSE UR STRIP-MCNC: NEGATIVE MG/DL
HGB UR QL STRIP.AUTO: NEGATIVE
HYALINE CASTS UR QL AUTO: ABNORMAL /LPF
KETONES UR QL STRIP: NEGATIVE
LEUKOCYTE ESTERASE UR QL STRIP.AUTO: ABNORMAL
N GONORRHOEA RRNA SPEC QL NAA+PROBE: NEGATIVE
NITRITE UR QL STRIP: NEGATIVE
PH UR STRIP.AUTO: 5.5 [PH] (ref 5–8)
PROT UR QL STRIP: NEGATIVE
RBC # UR STRIP: ABNORMAL /HPF
REF LAB TEST METHOD: ABNORMAL
SP GR UR STRIP: 1.02 (ref 1–1.03)
SQUAMOUS #/AREA URNS HPF: ABNORMAL /HPF
T VAGINALIS RRNA SPEC QL NAA+PROBE: NEGATIVE
UROBILINOGEN UR QL STRIP: ABNORMAL
WBC # UR STRIP: ABNORMAL /HPF

## 2024-08-26 PROCEDURE — 81001 URINALYSIS AUTO W/SCOPE: CPT

## 2024-08-26 PROCEDURE — 87086 URINE CULTURE/COLONY COUNT: CPT

## 2024-08-26 NOTE — TELEPHONE ENCOUNTER
Orders placed. If her symptoms are severe, she may need to go to ER. I likely will not see results today depending when she comes to complete test and culture will not be back for 2 days.

## 2024-08-26 NOTE — TELEPHONE ENCOUNTER
Please let patient know that her urine culture showed a low level of mixed jane which is suggestive of contamination.  We can recheck her urine sample if she would like, but at this time, there is no indication for antibiotics.

## 2024-08-27 DIAGNOSIS — N30.00 ACUTE CYSTITIS WITHOUT HEMATURIA: Primary | ICD-10-CM

## 2024-08-27 RX ORDER — NITROFURANTOIN 25; 75 MG/1; MG/1
100 CAPSULE ORAL 2 TIMES DAILY
Qty: 10 CAPSULE | Refills: 0 | Status: SHIPPED | OUTPATIENT
Start: 2024-08-27 | End: 2024-09-01

## 2024-08-28 LAB — BACTERIA SPEC AEROBE CULT: NO GROWTH

## 2024-08-30 ENCOUNTER — OFFICE VISIT (OUTPATIENT)
Dept: FAMILY MEDICINE CLINIC | Age: 34
End: 2024-08-30
Payer: COMMERCIAL

## 2024-08-30 VITALS
SYSTOLIC BLOOD PRESSURE: 125 MMHG | HEART RATE: 80 BPM | DIASTOLIC BLOOD PRESSURE: 87 MMHG | OXYGEN SATURATION: 99 % | WEIGHT: 231 LBS | HEIGHT: 67 IN | BODY MASS INDEX: 36.26 KG/M2 | TEMPERATURE: 98.1 F

## 2024-08-30 DIAGNOSIS — M54.50 ACUTE MIDLINE LOW BACK PAIN WITHOUT SCIATICA: Primary | ICD-10-CM

## 2024-08-30 PROCEDURE — 99213 OFFICE O/P EST LOW 20 MIN: CPT | Performed by: NURSE PRACTITIONER

## 2024-08-30 NOTE — PROGRESS NOTES
Chief Complaint  Urinary Tract Infection (Pt seen Fabiola Monte on 8/23/24 for UTI sx. Pt state she is still having sx even with taking Macrobid. )    Opal Lyons is a 33 year old female that presents to [facility] with c/o low back pain that has been going on for about 1 week. She was initially seen in office last week by JAMES Rider. She had negative poc testing as well as normal labs and negative urine culture. She was advised to stop macrobid since there was no bacterial growth but states she has still been taking it. Has one dose left. She states that the chills and body aches have resolved. Urine odor is better but the urine is still dark. States she is drinking plenty of fluids. She denies any fever.   Reports hx of frequent utis.    History of Present Illness    Current Outpatient Medications   Medication Instructions    acetaminophen (TYLENOL) 500-1,000 mg, Oral, Every 6 Hours PRN    albuterol sulfate  (90 Base) MCG/ACT inhaler 2 puffs, Inhalation, Every 4 Hours PRN    Beclomethasone Diprop HFA (QVAR Redihaler) 40 MCG/ACT inhaler 1 puff, Inhalation, 2 Times Daily - RT    busPIRone (BUSPAR) 5 MG tablet Take 1 tablet by mouth 3 (Three) Times a Day As Needed for anxiety. If one tablet is not effective may increase to 2 tablets 3 times daily    docusate sodium 100 mg    ibuprofen (ADVIL,MOTRIN) 600 MG tablet Take 1 tablet by mouth Every 8 (Eight) Hours As Needed for Pain for up to 12 days.    ipratropium-albuterol (DUO-NEB) 0.5-2.5 mg/3 ml nebulizer Inhale 1 vial by nebulization Every 6 (Six) Hours As Needed for wheezing    nitrofurantoin (macrocrystal-monohydrate) (MACROBID) 100 mg, Oral, 2 Times Daily    Nurtec 75 mg, Oral, Daily PRN    Qulipta 60 mg, Oral, Daily       The following portions of the patient's history were reviewed and updated as appropriate: allergies, current medications, past family history, past medical history, past social  "history, past surgical history, and problem list.    Objective   Vital Signs:   /87 (BP Location: Left arm, Patient Position: Sitting)   Pulse 80   Temp 98.1 °F (36.7 °C) (Oral)   Ht 170.2 cm (67.01\")   Wt 105 kg (231 lb)   SpO2 99%   BMI 36.17 kg/m²     Wt Readings from Last 3 Encounters:   08/30/24 105 kg (231 lb)   08/23/24 106 kg (233 lb)   08/20/24 104 kg (229 lb 6.4 oz)     BP Readings from Last 3 Encounters:   08/30/24 125/87   08/23/24 129/88   08/20/24 122/76     Physical Exam  Vitals and nursing note reviewed.   Constitutional:       Appearance: Normal appearance. She is not ill-appearing.   HENT:      Head: Normocephalic and atraumatic.   Cardiovascular:      Rate and Rhythm: Normal rate and regular rhythm.      Heart sounds: Normal heart sounds.   Pulmonary:      Effort: Pulmonary effort is normal.      Breath sounds: Normal breath sounds.   Musculoskeletal:        Back:       Comments: Midline back pain, mildly TTP   Neurological:      Mental Status: She is alert and oriented to person, place, and time.   Psychiatric:         Mood and Affect: Mood normal.         Behavior: Behavior normal.          Result Review :  The following data was reviewed by: JAMES Blanco on 08/30/2024:      Common labs          8/23/2024    11:57   Common Labs   Glucose 91    BUN 9    Creatinine 0.77    Sodium 141    Potassium 4.1    Chloride 107    Calcium 9.0    Albumin 4.2    Total Bilirubin 0.8    Alkaline Phosphatase 64    AST (SGOT) 25    ALT (SGPT) 36    WBC 3.96    Hemoglobin 13.5    Hematocrit 41.0    Platelets 185        Lab Results (last 72 hours)       ** No results found for the last 72 hours. **             No Images in the past 120 days found..    Lab Results   Component Value Date    SARSANTIGEN Not Detected 08/23/2024    COVID19 Not Detected 04/27/2021    FLUAAG Not Detected 08/23/2024    FLUBAG Not Detected 08/23/2024    RAPSCRN Negative 11/09/2023    BILIRUBINUR Negative 08/26/2024 "       Procedures        Assessment and Plan   Diagnoses and all orders for this visit:    1. Acute midline low back pain without sciatica (Primary)    Back pain seems more muscular than kidney related. Pt advised to take tylenol/ibuprofen and monitor symptoms. Instructed to f/u if no improvement.              There are no discontinued medications.       Follow Up   No follow-ups on file.  Patient was given instructions and counseling regarding her condition or for health maintenance advice. Please see specific information pulled into the AVS if appropriate.       JAMES Blanco  08/30/24  16:57 EDT

## 2024-08-30 NOTE — PATIENT INSTRUCTIONS
Increase your fluid intake, rest, tylenol/ibuprofen if needed for pain. Most recent urine culture was negative for any bacterial growth. I suspect your pain is more muscular than kidney related.If symptoms are worsening please go to the ER for further workup.

## 2024-09-04 ENCOUNTER — PRIOR AUTHORIZATION (OUTPATIENT)
Dept: NEUROLOGY | Facility: CLINIC | Age: 34
End: 2024-09-04
Payer: COMMERCIAL

## 2024-09-13 ENCOUNTER — PATIENT MESSAGE (OUTPATIENT)
Dept: NEUROLOGY | Facility: CLINIC | Age: 34
End: 2024-09-13
Payer: COMMERCIAL

## 2024-09-20 ENCOUNTER — SPECIALTY PHARMACY (OUTPATIENT)
Dept: PHARMACY | Facility: TELEHEALTH | Age: 34
End: 2024-09-20
Payer: COMMERCIAL

## 2024-09-20 RX ORDER — GALCANEZUMAB 120 MG/ML
120 INJECTION, SOLUTION SUBCUTANEOUS
Qty: 1 EACH | Refills: 5 | Status: SHIPPED | OUTPATIENT
Start: 2024-09-20

## 2024-09-26 ENCOUNTER — PATIENT MESSAGE (OUTPATIENT)
Dept: NEUROSURGERY | Facility: CLINIC | Age: 34
End: 2024-09-26
Payer: COMMERCIAL

## 2024-10-10 ENCOUNTER — TELEPHONE (OUTPATIENT)
Dept: NEUROLOGY | Facility: CLINIC | Age: 34
End: 2024-10-10
Payer: COMMERCIAL

## 2024-10-10 NOTE — TELEPHONE ENCOUNTER
Verified with patient she has had several migraine days and has taken full 30 day supply since 9/24. She is requesting samples.

## 2024-10-10 NOTE — TELEPHONE ENCOUNTER
Caller: AMRIT SANDERS    Relationship:SELF    Callback number: [phone]     Is it ok to leave a message: [x] Yes [] No    Requested medication for samples: NURTEC    How much medication does the patient currently have left: NONE, PHARMACY HAS NOTIFIED PT  NURTEC REFILL IS NOT AVAILABLE UNTIL10/13, ADVISED PT TO CALL OFFICE FOR SAMPLES.    Who will be picking up the samples: AMRIT SANDERS    Do you need information about patient financial assistance for this medication: [] Yes [x] No      PLEASE REVIEW ADVISE AS PT DOES NOT HAVE ANY OF THE NURTEC LEFT.  SHE HAS HAD A MIGRAINE SINCE YESTERDAY, RAN OUT OF THE NURTEC ON 10/08.

## 2024-10-11 ENCOUNTER — PATIENT MESSAGE (OUTPATIENT)
Dept: NEUROLOGY | Facility: CLINIC | Age: 34
End: 2024-10-11
Payer: COMMERCIAL

## 2024-10-11 RX ORDER — RIMEGEPANT SULFATE 75 MG/75MG
75 TABLET, ORALLY DISINTEGRATING ORAL AS NEEDED
Qty: 2 TABLET | Refills: 0 | COMMUNITY
Start: 2024-10-11

## 2024-10-11 NOTE — TELEPHONE ENCOUNTER
Patient's father (ID verified by Karie) picked up sample. Ren sent to patient that we cannot provide work note as she has not been seen in office per CB.

## 2024-10-11 NOTE — TELEPHONE ENCOUNTER
Caller: Dina Lyons    Relationship: Self    Best call back number: [phone]    What was the call regarding: PT CALLED TO LET US KNOW THAT HER FATHER, OSITO LYONS, WILL BE PICKING UP THE [facility] SAMPLE(S). HE WILL BE PICKING UP SAMPLES THIS MORNING.    PT STATES TODAY IS DAY 3 OF MISSING WORK DUE TO HER CURRENT HEADACHE. PT ASKS IF JAMES THOMAS WOULD BE WILL TO WRITE HER A WORK EXCUSE NOTE (MISSED WORK 10/9/24-10/11/24). WORK EXCUSE CAN BE UPLOADED TO [employer].    Do you require a callback: YES, PLEASE.    PLEASE REVIEW AND ADVISE.

## 2024-10-14 ENCOUNTER — SPECIALTY PHARMACY (OUTPATIENT)
Dept: PHARMACY | Facility: TELEHEALTH | Age: 34
End: 2024-10-14
Payer: COMMERCIAL

## 2024-10-14 NOTE — PROGRESS NOTES
Specialty Pharmacy Refill Coordination Note     Dina is a 34 y.o. female contacted today regarding refills of  Emgality,Nurtec specialty medication(s).    Reviewed and verified with patient:       Specialty medication(s) and dose(s) confirmed: yes    Refill Questions      Flowsheet Row Most Recent Value   Changes to allergies? No   Changes to medications? No   New conditions or infections since last clinic visit No   Unplanned office visit, urgent care, ED, or hospital admission in the last 4 weeks  No   How does patient/caregiver feel medication is working? Very good   Financial problems or insurance changes  No   Since the previous refill, were any specialty medication doses or scheduled injections missed or delayed?  No   Does this patient require a clinical escalation to a pharmacist? No            Delivery Questions      Flowsheet Row Most Recent Value   Delivery method UPS  [UPS NEXT DAY AIR]   Delivery address verified with patient/caregiver? Yes  [UPS NEXT DAY AIR]   Delivery address Home  [UPS NEXT DAY AIR]   Number of medications in delivery 2   Medication(s) being filled and delivered Rimegepant Sulfate (Nurtec), Galcanezumab-gnlm (Emgality)   Doses left of specialty medications 1 Nurtec-sample fror provider office   Copay verified? Yes  [$0.00]   Copay amount $0.00   Copay form of payment No copayment ($0)  [$0.00]   Ship Date 10/14/24   Delivery Date 10/15/24   Signature Required No                   Follow-up: 21 day(s)     Sunshine Aldridge, Pharmacy Technician  Specialty Pharmacy Technician

## 2024-10-18 ENCOUNTER — HOSPITAL ENCOUNTER (OUTPATIENT)
Dept: ULTRASOUND IMAGING | Facility: HOSPITAL | Age: 34
Discharge: HOME | End: 2024-10-18
Payer: COMMERCIAL

## 2024-10-18 ENCOUNTER — LAB (OUTPATIENT)
Dept: LAB | Facility: HOSPITAL | Age: 34
End: 2024-10-18
Payer: COMMERCIAL

## 2024-10-18 ENCOUNTER — OFFICE VISIT (OUTPATIENT)
Dept: FAMILY MEDICINE CLINIC | Age: 34
End: 2024-10-18
Payer: COMMERCIAL

## 2024-10-18 ENCOUNTER — HOSPITAL ENCOUNTER (OUTPATIENT)
Dept: GENERAL RADIOLOGY | Facility: HOSPITAL | Age: 34
Discharge: HOME | End: 2024-10-18
Payer: COMMERCIAL

## 2024-10-18 VITALS
BODY MASS INDEX: 36.57 KG/M2 | HEART RATE: 68 BPM | WEIGHT: 233 LBS | HEIGHT: 67 IN | OXYGEN SATURATION: 96 % | TEMPERATURE: 98.1 F | DIASTOLIC BLOOD PRESSURE: 79 MMHG | SYSTOLIC BLOOD PRESSURE: 118 MMHG

## 2024-10-18 DIAGNOSIS — R60.0 LEG EDEMA, LEFT: ICD-10-CM

## 2024-10-18 DIAGNOSIS — M25.562 ACUTE PAIN OF LEFT KNEE: Primary | ICD-10-CM

## 2024-10-18 DIAGNOSIS — M25.562 ACUTE PAIN OF LEFT KNEE: ICD-10-CM

## 2024-10-18 LAB — D DIMER PPP FEU-MCNC: 0.47 MCGFEU/ML (ref 0–0.5)

## 2024-10-18 PROCEDURE — 36415 COLL VENOUS BLD VENIPUNCTURE: CPT

## 2024-10-18 PROCEDURE — 85379 FIBRIN DEGRADATION QUANT: CPT

## 2024-10-18 PROCEDURE — 99214 OFFICE O/P EST MOD 30 MIN: CPT | Performed by: NURSE PRACTITIONER

## 2024-10-18 PROCEDURE — 73562 X-RAY EXAM OF KNEE 3: CPT

## 2024-10-18 PROCEDURE — 93971 EXTREMITY STUDY: CPT

## 2024-10-18 RX ORDER — MELOXICAM 15 MG/1
15 TABLET ORAL DAILY PRN
Qty: 30 TABLET | Refills: 0 | Status: SHIPPED | OUTPATIENT
Start: 2024-10-18

## 2024-10-18 NOTE — PROGRESS NOTES
Chief Complaint  Knee Pain ((L) swollen and hurts to put pressure on it x 1 week. No known injury )    Subjective        Dina Lyons presents to [facility] today for left knee pain x7 days , woke up with the pain , denies fall or injury.  States the knee started hurting after sleeping in her sleep number bed in a elevated position, denies shortness of breath, denies chest pain denies fever.  Does have calf tenderness and feels like the pain is from her knee all the way to her foot.    Current Outpatient Medications:     acetaminophen (TYLENOL) 500 MG tablet, Take 1-2 tablets by mouth Every 6 (Six) Hours As Needed for Mild Pain., Disp: , Rfl:     busPIRone (BUSPAR) 5 MG tablet, Take 1 tablet by mouth 3 (Three) Times a Day As Needed for anxiety. If one tablet is not effective may increase to 2 tablets 3 times daily, Disp: 90 tablet, Rfl: 2    galcanezumab-gnlm (Emgality) 120 MG/ML auto-injector pen, Inject 1 mL under the skin into the appropriate area as directed Every 30 (Thirty) Days., Disp: 1 each, Rfl: 5    Rimegepant Sulfate (Nurtec) 75 MG tablet dispersible tablet, Take 1 tablet by mouth Daily As Needed (migraine)., Disp: 8 tablet, Rfl: 5    meloxicam (MOBIC) 15 MG tablet, Take 1 tablet by mouth Daily As Needed (joint pain). Take with food., Disp: 30 tablet, Rfl: 0    Rimegepant Sulfate (Nurtec) 75 MG tablet dispersible tablet, Take 1 tablet by mouth As Needed (as needed for migraine). (Patient not taking: Reported on 10/18/2024), Disp: 2 tablet, Rfl: 0  There are no discontinued medications.      Allergies:  Codeine, Contrast dye (echo or unknown ct/mr), Keflex [cephalexin], Levaquin [levofloxacin], Penicillins, and Sulfa antibiotics      Objective   Vital Signs:   Vitals:    10/18/24 0845   BP: 118/79   BP Location: Right arm   Patient Position: Sitting   Cuff Size: Large Adult   Pulse: 68   Temp: 98.1 °F (36.7 °C)   TempSrc: Oral   SpO2: 96%   Weight: 106 kg (233 lb)  "  Height: 170.2 cm (67.01\")     Body mass index is 36.48 kg/m².           Physical Exam  Constitutional:       Appearance: Normal appearance.   Neck:      Vascular: No carotid bruit.   Cardiovascular:      Rate and Rhythm: Normal rate and regular rhythm.      Heart sounds: Normal heart sounds.   Pulmonary:      Effort: Pulmonary effort is normal.      Breath sounds: Normal breath sounds.   Musculoskeletal:      Comments: Left knee tenderness, slight warmth, trace bilateral lower extremity edema, negative Homans' sign   Skin:     General: Skin is warm and dry.   Neurological:      General: No focal deficit present.      Mental Status: She is alert.   Psychiatric:         Mood and Affect: Mood normal.         Behavior: Behavior normal.             Lab Results   Component Value Date    GLUCOSE 91 08/23/2024    BUN 9 08/23/2024    CREATININE 0.77 08/23/2024    EGFRIFNONA 108 04/27/2021    BCR 11.7 08/23/2024    K 4.1 08/23/2024    CO2 25.4 08/23/2024    CALCIUM 9.0 08/23/2024    ALBUMIN 4.2 08/23/2024    AST 25 08/23/2024    ALT 36 (H) 08/23/2024           Lab Results   Component Value Date    WBC 3.96 08/23/2024    HGB 13.5 08/23/2024    HCT 41.0 08/23/2024    MCV 88.4 08/23/2024     08/23/2024           Procedures         Diagnoses and all orders for this visit:    1. Acute pain of left knee (Primary)  Comments:  Discussed knee brace, additional therapies once ultrasound is complete  Orders:  -     XR Knee 3 View Left; Future  -     meloxicam (MOBIC) 15 MG tablet; Take 1 tablet by mouth Daily As Needed (joint pain). Take with food.  Dispense: 30 tablet; Refill: 0    2. Leg edema, left  -     D-dimer, Quantitative; Future  -     US Venous Doppler Lower Extremity Left (duplex); Future            Follow Up  Return if symptoms worsen or fail to improve, for will call with imaging results , next steps.  Patient was given instructions and counseling regarding her condition or for health maintenance advice. Please see " specific information pulled into the AVS if appropriate.           Mariama Franz, APRN  10/18/2024    Please note that portions of this document were completed using a voice recognition program.

## 2024-10-18 NOTE — PROGRESS NOTES
Please let her know that the venous ultrasound of her left lower leg is negative for blood clot as we discussed at the office visit.  Awaiting x-ray of her knee.

## 2024-10-21 DIAGNOSIS — M25.562 ACUTE PAIN OF LEFT KNEE: Primary | ICD-10-CM

## 2024-10-21 RX ORDER — BACLOFEN 10 MG/1
10 TABLET ORAL 2 TIMES DAILY PRN
Qty: 20 TABLET | Refills: 0 | Status: SHIPPED | OUTPATIENT
Start: 2024-10-21

## 2024-10-21 NOTE — PROGRESS NOTES
Baclofen sent to pharmacy, if her pain is not improving she will need to go to physical therapy so that we could do other imaging and insurance will not approve an MRI or any other testing done without physical therapy on board

## 2024-10-21 NOTE — PROGRESS NOTES
The venous Doppler was negative as well so there is no blood clot.  Mainstay of therapy is anti-inflammatories, possible muscle relaxer might help, and going to physical therapy, we can get that set up for her if she wants.

## 2024-10-21 NOTE — PROGRESS NOTES
X-ray of knee shows no fracture or dislocation no significant effusion no acute bony injury please let her know

## 2024-11-08 ENCOUNTER — SPECIALTY PHARMACY (OUTPATIENT)
Dept: PHARMACY | Facility: TELEHEALTH | Age: 34
End: 2024-11-08
Payer: COMMERCIAL

## 2024-11-08 NOTE — PROGRESS NOTES
Specialty Pharmacy Refill Coordination Note     Dina is a 34 y.o. female contacted today regarding refills of  Emgality,Nurtec specialty medication(s).    Reviewed and verified with patient:       Specialty medication(s) and dose(s) confirmed: yes    Refill Questions      Flowsheet Row Most Recent Value   Changes to allergies? No   Changes to medications? No   New conditions or infections since last clinic visit No   Unplanned office visit, urgent care, ED, or hospital admission in the last 4 weeks  No   How does patient/caregiver feel medication is working? Very good   Financial problems or insurance changes  No   Since the previous refill, were any specialty medication doses or scheduled injections missed or delayed?  No   Does this patient require a clinical escalation to a pharmacist? No            Delivery Questions      Flowsheet Row Most Recent Value   Delivery method UPS  [UPS NEXT DAY AIR]   Delivery address verified with patient/caregiver? Yes  [UPS NEXT DAY AIR]   Delivery address Home  [UPS NEXT DAY AIR]   Number of medications in delivery 2   Medication(s) being filled and delivered Rimegepant Sulfate (Nurtec), Galcanezumab-gnlm (Emgality)   Doses left of specialty medications 4 doses Nurtec   Copay verified? Yes  [$0.00]   Copay amount $0.00   Copay form of payment No copayment ($0)   Ship Date 11/11/2024   Delivery Date 11/12/2024   Signature Required No                   Follow-up: 21 day(s)     Sunshine Aldridge, Pharmacy Technician  Specialty Pharmacy Technician

## 2024-12-05 ENCOUNTER — SPECIALTY PHARMACY (OUTPATIENT)
Dept: PHARMACY | Facility: TELEHEALTH | Age: 34
End: 2024-12-05
Payer: COMMERCIAL

## 2024-12-05 NOTE — PROGRESS NOTES
Specialty Pharmacy Refill Coordination Note     Dina is a 34 y.o. female contacted today regarding refills of  Emgality,Nurtec specialty medication(s).    Reviewed and verified with patient:       Specialty medication(s) and dose(s) confirmed: yes    Refill Questions      Flowsheet Row Most Recent Value   Changes to allergies? No   Changes to medications? No   New conditions or infections since last clinic visit No   Unplanned office visit, urgent care, ED, or hospital admission in the last 4 weeks  No   How does patient/caregiver feel medication is working? Very good   Financial problems or insurance changes  No   Since the previous refill, were any specialty medication doses or scheduled injections missed or delayed?  No   Does this patient require a clinical escalation to a pharmacist? No            Delivery Questions      Flowsheet Row Most Recent Value   Delivery method UPS  [UPS NEXT DAY AIR]   Delivery address verified with patient/caregiver? Yes  [UPS NEXT DAY AIR]   Delivery address Home  [UPS NEXT DAY AIR]   Number of medications in delivery 2   Medication(s) being filled and delivered Rimegepant Sulfate (Nurtec), Galcanezumab-gnlm (Emgality)   Doses left of specialty medications #2 Nurtec   Copay verified? Yes  [$0.00]   Copay amount $0.00   Copay form of payment No copayment ($0)   Ship Date 12/05/2024   Delivery Date 12/06/2024   Signature Required No                   Follow-up: 21 day(s)     Sunshine Aldridge, Pharmacy Technician  Specialty Pharmacy Technician

## 2024-12-18 ENCOUNTER — PRIOR AUTHORIZATION (OUTPATIENT)
Dept: NEUROLOGY | Facility: CLINIC | Age: 34
End: 2024-12-18

## 2024-12-18 ENCOUNTER — OFFICE VISIT (OUTPATIENT)
Dept: NEUROLOGY | Facility: CLINIC | Age: 34
End: 2024-12-18
Payer: COMMERCIAL

## 2024-12-18 VITALS
HEART RATE: 80 BPM | WEIGHT: 234.4 LBS | BODY MASS INDEX: 36.79 KG/M2 | DIASTOLIC BLOOD PRESSURE: 90 MMHG | SYSTOLIC BLOOD PRESSURE: 130 MMHG | HEIGHT: 67 IN

## 2024-12-18 DIAGNOSIS — G43.419 INTRACTABLE HEMIPLEGIC MIGRAINE WITHOUT STATUS MIGRAINOSUS: Primary | ICD-10-CM

## 2024-12-18 PROCEDURE — 99214 OFFICE O/P EST MOD 30 MIN: CPT | Performed by: NURSE PRACTITIONER

## 2024-12-18 RX ORDER — ATOGEPANT 60 MG/1
60 TABLET ORAL DAILY
Qty: 30 TABLET | Refills: 5 | Status: SHIPPED | OUTPATIENT
Start: 2024-12-18

## 2024-12-18 NOTE — PROGRESS NOTES
"Chief Complaint  Migraine    Subjective          Dina Lyons presents to [facility]  History of Present Illness    History of Present Illness  The patient is a 34-year-old female who presents to the office for a follow-up on migraines.    At the last visit, she was started on Emgality for preventative therapy and Nurtec as needed. She reports experiencing approximately 3 headache episodes per week. Initially, she found Nurtec to be effective when used on an as-needed basis, but she has since increased its usage due to a resurgence in her symptoms. A box of 12 tablets, which previously lasted her 3 weeks, is now being consumed at a faster rate.    MEDICATIONS  Emgality, Nurtec       Objective   Vital Signs:   /90   Pulse 80   Ht 170.2 cm (67.01\")   Wt 106 kg (234 lb 6.4 oz)   BMI 36.70 kg/m²     Physical Exam  HENT:      Head: Normocephalic.   Pulmonary:      Effort: Pulmonary effort is normal.   Neurological:      Mental Status: She is alert and oriented to person, place, and time.      Sensory: Sensation is intact.      Motor: Motor function is intact.      Coordination: Coordination is intact.      Deep Tendon Reflexes: Reflexes are normal and symmetric.        Neurological Exam  Mental Status  Alert. Oriented to person, place, and time.    Sensory  Normal sensation.    Reflexes  Deep tendon reflexes are 2+ and symmetric in all four extremities.    Coordination    Finger-to-nose, rapid alternating movements and heel-to-shin normal bilaterally without dysmetria.      Result Review :               Assessment and Plan    There are no diagnoses linked to this encounter.    Assessment & Plan  1. Migraine.  She reports experiencing approximately 3 headache episodes per week. Initially, she found Nurtec to be effective when used on an as-needed basis, but she has since increased its usage due to a resurgence in her symptoms. A box of 12 tablets, which previously lasted " her 3 weeks, is now being consumed at a faster rate. She is advised to continue with her current Emgality regimen. An attempt will be made to secure approval for Qulipta, given that Emgality has not significantly improved her condition. She is also instructed to persist with Nurtec as needed.    Follow-up  The patient will follow up in 4 months.         Follow Up   Return in about 4 months (around 4/18/2025) for Migraine f/u.  Patient was given instructions and counseling regarding her condition or for health maintenance advice. Please see specific information pulled into the AVS if appropriate.       Patient or patient representative verbalized consent for the use of Ambient Listening during the visit with  JAMES Dumont for chart documentation. 12/18/2024  15:14 EST

## 2024-12-19 ENCOUNTER — OFFICE VISIT (OUTPATIENT)
Dept: FAMILY MEDICINE CLINIC | Age: 34
End: 2024-12-19
Payer: COMMERCIAL

## 2024-12-19 ENCOUNTER — SPECIALTY PHARMACY (OUTPATIENT)
Dept: PHARMACY | Facility: TELEHEALTH | Age: 34
End: 2024-12-19
Payer: COMMERCIAL

## 2024-12-19 VITALS
OXYGEN SATURATION: 98 % | HEIGHT: 67 IN | DIASTOLIC BLOOD PRESSURE: 76 MMHG | HEART RATE: 78 BPM | BODY MASS INDEX: 36.73 KG/M2 | WEIGHT: 234 LBS | SYSTOLIC BLOOD PRESSURE: 139 MMHG | TEMPERATURE: 98 F

## 2024-12-19 DIAGNOSIS — M25.562 ACUTE PAIN OF LEFT KNEE: Primary | ICD-10-CM

## 2024-12-19 PROCEDURE — 99213 OFFICE O/P EST LOW 20 MIN: CPT | Performed by: NURSE PRACTITIONER

## 2024-12-19 RX ORDER — PREDNISONE 20 MG/1
TABLET ORAL
Qty: 19 TABLET | Refills: 0 | Status: SHIPPED | OUTPATIENT
Start: 2024-12-19 | End: 2024-12-29

## 2024-12-19 NOTE — PROGRESS NOTES
"Dina Lyons presents to [facility] with complaint of  Knee Pain (Seen DT on 10/18/24 for this; Given Meloxicam & XR completed. /Pain is worsening, decreasing mobility )    SUBJECTIVE  Knee Pain   There was no injury mechanism. The pain is present in the left knee. The quality of the pain is described as aching. The pain is severe. The pain has been Worsening since onset. Associated symptoms include a loss of motion, muscle weakness, numbness and tingling. Pertinent negatives include no inability to bear weight or loss of sensation. She reports no foreign bodies present. The symptoms are aggravated by movement, palpation and weight bearing. She has tried NSAIDs (knee brace, muscle relaxer) for the symptoms. The treatment provided no relief.         OBJECTIVE  Vital Signs:   /76 (BP Location: Right arm, Patient Position: Sitting, Cuff Size: Adult)   Pulse 78   Temp 98 °F (36.7 °C) (Oral)   Ht 170.2 cm (67.01\")   Wt 106 kg (234 lb)   SpO2 98%   BMI 36.64 kg/m²       Physical Exam  Vitals reviewed.   Constitutional:       General: She is not in acute distress.     Appearance: Normal appearance. She is not ill-appearing.   HENT:      Head: Normocephalic and atraumatic.      Nose: Nose normal.      Mouth/Throat:      Mouth: Mucous membranes are moist.      Pharynx: Oropharynx is clear.   Cardiovascular:      Rate and Rhythm: Normal rate and regular rhythm.      Pulses: Normal pulses.      Heart sounds: Normal heart sounds.   Pulmonary:      Effort: Pulmonary effort is normal.      Breath sounds: Normal breath sounds.   Musculoskeletal:      Cervical back: Neck supple.      Left knee: Swelling and effusion present. No erythema or ecchymosis. Decreased range of motion. Tenderness present.   Skin:     General: Skin is warm and dry.   Neurological:      General: No focal deficit present.      Mental Status: She is alert and oriented to person, place, and time. Mental status is at " baseline.   Psychiatric:         Mood and Affect: Mood normal.         Behavior: Behavior normal.         Judgment: Judgment normal.          Results Review:  The following data was reviewed by JAMES Rodrigues [unfilled] 16:18 EST.    Study Result    Narrative & Impression   XR KNEE 3 VW LEFT     Date of Exam: 10/18/2024 1:00 PM EDT     Indication: knee pain     Comparison: None available.     Findings:  No acute fracture or dislocation. No chondrocalcinosis or degenerative change. No significant effusion. No gross osseous lesion or periosteal reaction.     IMPRESSION:  Impression:  No acute bony injury.        Electronically Signed: Diaz Laughlin MD    10/21/2024 2:50 PM EDT    Workstation ID: [id number]       ASSESSMENT AND PLAN:  Diagnoses and all orders for this visit:    1. Acute pain of left knee (Primary)  -     predniSONE (DELTASONE) 20 MG tablet; Take 3 tablets by mouth Daily for 3 days, THEN 2 tablets Daily for 3 days, THEN 1 tablet Daily for 3 days, THEN 1/2 tablet Daily for 1 day.  Dispense: 19 tablet; Refill: 0  -     Diclofenac Sodium (VOLTAREN) 1 % gel gel; Apply 4 g topically to the appropriate area as directed 4 (Four) Times a Day As Needed (pain) for up to 30 days.  Dispense: 100 g; Refill: 2  -     Ambulatory Referral to Orthopedic Surgery      Patient has pretty significant swelling and fluid collection in office today of left knee.  Pain has been present for 2 months and is worsening. Skin is not warm or erythematous, patient is afebrile so not concerned fro joint infection at this time.  She understands if these symptoms were to occur she should seek emergency care.  Considered referring to physical therapy, but patient may need arthrocentesis so she will be referred to orthopedic surgery.  She was given prednisone taper and diclofenac gel to hopefully help with her pain until she can get in with orthopedics.        Follow Up   No follow-ups on file. Patient to notify office with any acute concerns  or issues.  Patient verbalizes understanding, agrees with plan of care and has no further questions upon discharge.     Patient was given instructions and counseling regarding her condition or for health maintenance advice. Please see specific information pulled into the AVS if appropriate.     Discussed the importance of following up with any needed screening tests/labs/specialist appointments and any requested follow-up recommended by me today. Importance of maintaining follow-up discussed and patient accepts that missed appointments can delay diagnosis and potentially lead to worsening of conditions.    Part of this note may be an electronic transcription/translation of spoken language to printed text using the Dragon Dictation System.

## 2024-12-30 ENCOUNTER — SPECIALTY PHARMACY (OUTPATIENT)
Dept: PHARMACY | Facility: TELEHEALTH | Age: 34
End: 2024-12-30
Payer: COMMERCIAL

## 2024-12-30 NOTE — PROGRESS NOTES
Specialty Pharmacy Refill Coordination Note     Dina is a 34 y.o. female contacted today regarding refills of  Nurtec specialty medication(s).    Reviewed and verified with patient:       Specialty medication(s) and dose(s) confirmed: yes    Refill Questions      Flowsheet Row Most Recent Value   Changes to allergies? No   Changes to medications? Yes  [Provider stopped Emgality]   New conditions or infections since last clinic visit No   Unplanned office visit, urgent care, ED, or hospital admission in the last 4 weeks  No   How does patient/caregiver feel medication is working? Very good   Financial problems or insurance changes  No   Since the previous refill, were any specialty medication doses or scheduled injections missed or delayed?  No   Does this patient require a clinical escalation to a pharmacist? No            Delivery Questions      Flowsheet Row Most Recent Value   Delivery method UPS  [UPS NEXT DAY AIR]   Delivery address verified with patient/caregiver? Yes  [UPS NEXT DAY AIR]   Delivery address Home  [UPS NEXT DAY AIR]   Number of medications in delivery 1   Medication(s) being filled and delivered Rimegepant Sulfate (Nurtec)   Doses left of specialty medications #1   Copay verified? Yes  [$0.00]   Copay amount $0.00   Copay form of payment No copayment ($0)   Ship Date 12/30/24   Delivery Date 12/31/24   Signature Required No                   Follow-up: 21 day(s)     Sunshine Aldridge, Pharmacy Technician  Specialty Pharmacy Technician

## 2025-01-08 ENCOUNTER — OFFICE VISIT (OUTPATIENT)
Dept: FAMILY MEDICINE CLINIC | Age: 35
End: 2025-01-08
Payer: COMMERCIAL

## 2025-01-08 VITALS
TEMPERATURE: 98.1 F | WEIGHT: 235 LBS | HEART RATE: 82 BPM | BODY MASS INDEX: 36.88 KG/M2 | DIASTOLIC BLOOD PRESSURE: 77 MMHG | HEIGHT: 67 IN | OXYGEN SATURATION: 97 % | SYSTOLIC BLOOD PRESSURE: 137 MMHG

## 2025-01-08 DIAGNOSIS — R09.81 NASAL CONGESTION: ICD-10-CM

## 2025-01-08 DIAGNOSIS — J02.9 SORE THROAT: ICD-10-CM

## 2025-01-08 DIAGNOSIS — H66.90 ACUTE OTITIS MEDIA, UNSPECIFIED OTITIS MEDIA TYPE: Primary | ICD-10-CM

## 2025-01-08 DIAGNOSIS — R52 BODY ACHES: ICD-10-CM

## 2025-01-08 DIAGNOSIS — R05.1 ACUTE COUGH: ICD-10-CM

## 2025-01-08 PROCEDURE — 99214 OFFICE O/P EST MOD 30 MIN: CPT | Performed by: STUDENT IN AN ORGANIZED HEALTH CARE EDUCATION/TRAINING PROGRAM

## 2025-01-08 PROCEDURE — 87880 STREP A ASSAY W/OPTIC: CPT | Performed by: STUDENT IN AN ORGANIZED HEALTH CARE EDUCATION/TRAINING PROGRAM

## 2025-01-08 PROCEDURE — 87081 CULTURE SCREEN ONLY: CPT | Performed by: STUDENT IN AN ORGANIZED HEALTH CARE EDUCATION/TRAINING PROGRAM

## 2025-01-08 PROCEDURE — 87428 SARSCOV & INF VIR A&B AG IA: CPT | Performed by: STUDENT IN AN ORGANIZED HEALTH CARE EDUCATION/TRAINING PROGRAM

## 2025-01-08 RX ORDER — DEXTROMETHORPHAN HYDROBROMIDE AND PROMETHAZINE HYDROCHLORIDE 15; 6.25 MG/5ML; MG/5ML
5 SYRUP ORAL NIGHTLY PRN
Qty: 120 ML | Refills: 0 | Status: SHIPPED | OUTPATIENT
Start: 2025-01-08

## 2025-01-08 RX ORDER — FLUTICASONE PROPIONATE 50 MCG
1 SPRAY, SUSPENSION (ML) NASAL 2 TIMES DAILY
Qty: 16 G | Refills: 0 | Status: SHIPPED | OUTPATIENT
Start: 2025-01-08

## 2025-01-08 RX ORDER — DOXYCYCLINE 100 MG/1
100 CAPSULE ORAL 2 TIMES DAILY
Qty: 20 CAPSULE | Refills: 0 | Status: SHIPPED | OUTPATIENT
Start: 2025-01-08

## 2025-01-08 NOTE — LETTER
January 8, 2025     Patient: Dina Lyons   YOB: 1990   Date of Visit: 1/8/2025       To Whom It May Concern:    It is my medical opinion that Dina Lyons should be excused from work on 1/7/2025 and 1/8/2025.         Sincerely,        JAMES Becerril    CC: No Recipients

## 2025-01-08 NOTE — PROGRESS NOTES
Chief Complaint     Sore Throat (RT side of throat hurts X 1 day ), Hoarse (X 1 day), Generalized Body Aches (X 1 dya ), Cough (Dry cough X 1 day ), and Earache (RT ear X 1 day )    History of Present Illness     Dina Lyons is a 34 y.o. female who presents to [facility] with complaints of not feeling good, started yesterday. Right side of throat feels like swallowed a cactus and right ear ache. Also has cough, nasal congestion, HA, body aches, chest tightness.  Has not tried taking anything OTC.  Would also like a work note for yesterday and today.    Denies any chest pain, shortness of air, fever, chills, nausea, vomiting, diarrhea, dizziness     History      Past Medical History:   Diagnosis Date    Abscess of right thigh 12/15/2022    Anxiety and depression     Bronchitis 2022    Carpal tunnel syndrome     Family history of complication of anesthesia     MOTHER HAS SEVERE NAUSEA    History of migraine     Left sided numbness 2022    Left-sided weakness 2022    Migraine     Ovarian cyst     RIGHT    Ovarian tumor (benign), left     Pyelonephritis     Seasonal asthma        Past Surgical History:   Procedure Laterality Date    ADENOIDECTOMY      APPENDECTOMY  2006    CARPAL TUNNEL RELEASE Left 2024    Procedure: LEFT CARPAL TUNNEL RELEASE;  Surgeon: Vincent Willett MD;  [place] [facility] [place] [facility] [place]  Service: [place]opedics;  Laterality: Left;    CARPAL TUNNEL RELEASE Right 2024    Procedure: CARPAL TUNNEL RELEASE;  Surgeon: Vincent Willett MD;  [place] [facility] [place] [facility] [place]  Service: [place]opedics;  Laterality: Right;     SECTION      X1    CHOLECYSTECTOMY  2006    DIAGNOSTIC LAPAROSCOPY Left 2021    Procedure: SALPINGO OOPHORECTOMY  LAPAROSCOPY, LYSIS OF ADHESIONS;  Surgeon: Don Proctor MD;  [place] [facility] [place] [facility] [place]  Service: Obstetrics/Gynecology;  Laterality: Left;    FLAT FOOT RECONSTRUCTION Bilateral  2007    FOOT SURGERY Bilateral      LAPAROTOMY SALPINGO OOPHORECTOMY Left 04/29/2021    OVARY SURGERY Left     TO REMOVE BENIGN OVARIAN TUMOR       Family History   Problem Relation Age of Onset    No Known Problems Father     No Known Problems Mother     Malig Hyperthermia Neg Hx         Current Medications        Current Outpatient Medications:     acetaminophen (TYLENOL) 500 MG tablet, Take 1-2 tablets by mouth Every 6 (Six) Hours As Needed for Mild Pain., Disp: , Rfl:     busPIRone (BUSPAR) 5 MG tablet, Take 1 tablet by mouth 3 (Three) Times a Day As Needed for anxiety. If one tablet is not effective may increase to 2 tablets 3 times daily, Disp: 90 tablet, Rfl: 2    Diclofenac Sodium (VOLTAREN) 1 % gel gel, Apply 4 g topically to the appropriate area as directed 4 (Four) Times a Day As Needed (pain) for up to 30 days., Disp: 100 g, Rfl: 2    rimegepant sulfate (Nurtec) 75 MG tablet, Take 1 tablet by mouth as needed for migraine. max 1 tablet per 24 hours, Disp: 8 tablet, Rfl: 5    Atogepant (Qulipta) 60 MG tablet, Take 1 tablet by mouth Daily. (Patient not taking: Reported on 1/8/2025), Disp: 30 tablet, Rfl: 5    doxycycline (VIBRAMYCIN) 100 MG capsule, Take 1 capsule by mouth 2 (Two) Times a Day., Disp: 20 capsule, Rfl: 0    fluticasone (FLONASE) 50 MCG/ACT nasal spray, Administer 1 spray into the nostril(s) as directed by provider 2 (Two) Times a Day., Disp: 16 g, Rfl: 0    promethazine-dextromethorphan (PROMETHAZINE-DM) 6.25-15 MG/5ML syrup, Take 5 mL by mouth At Night As Needed for Cough., Disp: 120 mL, Rfl: 0     Allergies     Allergies   Allergen Reactions    Codeine Nausea And Vomiting    Contrast Dye (Echo Or Unknown Ct/Mr) Hives     Patient has tolerated contrast dye when pretreated with benadryl    Keflex [Cephalexin] Hives    Levaquin [Levofloxacin] Swelling    Penicillins Hives    Sulfa Antibiotics Hives       Social History       Social History     Social History Narrative    Not on file       Immunizations  "    Immunization:  Immunization History   Administered Date(s) Administered    Flu Vaccine Intradermal Quad 18-64YR 02/06/2008    HPV Quadrivalent 08/15/2008, 03/30/2009, 07/22/2009    Hep B, Adolescent or Pediatric 01/20/2004, 02/23/2004, 08/04/2004    Influenza Seasonal Injectable 02/06/2008    MMR 08/23/2000    Td (TDVAX) 01/20/2004          Objective     Objective     Vital Signs:   /77 (BP Location: Right arm, Patient Position: Sitting, Cuff Size: Adult)   Pulse 82   Temp 98.1 °F (36.7 °C) (Oral)   Ht 170.2 cm (67.01\")   Wt 107 kg (235 lb)   SpO2 97%   BMI 36.80 kg/m²       Physical Exam  Vitals and nursing note reviewed.   Constitutional:       Appearance: Normal appearance. She is obese.   HENT:      Head: Normocephalic.      Right Ear: Ear canal and external ear normal. A middle ear effusion is present.      Left Ear: Tympanic membrane, ear canal and external ear normal.      Ears:      Comments: Purulent material behind the right tympanic membrane.     Nose: Congestion present.      Mouth/Throat:      Lips: Pink.      Mouth: Mucous membranes are moist.      Pharynx: Uvula midline.   Eyes:      Conjunctiva/sclera: Conjunctivae normal.      Pupils: Pupils are equal, round, and reactive to light.   Cardiovascular:      Rate and Rhythm: Normal rate and regular rhythm.      Pulses: Normal pulses.      Heart sounds: Normal heart sounds.   Pulmonary:      Effort: Pulmonary effort is normal.      Breath sounds: Normal breath sounds.      Comments: Cough present  Abdominal:      General: Bowel sounds are normal.      Palpations: Abdomen is soft.   Musculoskeletal:         General: Normal range of motion.      Cervical back: Normal range of motion and neck supple.   Skin:     General: Skin is warm and dry.   Neurological:      General: No focal deficit present.      Mental Status: She is alert and oriented to person, place, and time.   Psychiatric:         Attention and Perception: Attention normal.       "   Mood and Affect: Mood and affect normal.         Behavior: Behavior normal. Behavior is cooperative.         Results    The following data was reviewed by: JAMES Becerril on 01/08/25             POCT SARS-CoV-2 Antigen NIRANJAN + Flu (01/08/2025 11:53)  POCT rapid strep A (01/08/2025 11:53)     Assessment and Plan        Assessment and Plan       Acute otitis media, unspecified otitis media type    Orders:    doxycycline (VIBRAMYCIN) 100 MG capsule; Take 1 capsule by mouth 2 (Two) Times a Day.  Instructed to finish all of antibiotic  Acute cough    Orders:    promethazine-dextromethorphan (PROMETHAZINE-DM) 6.25-15 MG/5ML syrup; Take 5 mL by mouth At Night As Needed for Cough.  Discussed the use of cough drops, Chloraseptic spray/lozenges, Vicks VapoRub, hot tea with honey, humidifier  Sore throat    Orders:    POCT rapid strep A    Beta Strep Culture, Throat - , Throat; Future    Beta Strep Culture, Throat - Swab, Throat  Rapid COVID/flu negative  Rapid strep negative  Nasal congestion    Orders:    fluticasone (FLONASE) 50 MCG/ACT nasal spray; Administer 1 spray into the nostril(s) as directed by provider 2 (Two) Times a Day.    Body aches    Orders:    POCT SARS-CoV-2 Antigen NIRANJAN + Flu  May continue to use Tylenol and/or ibuprofen as needed for pain     Occasional hospital precautions.        Follow Up        Follow Up   Return for With PCP, Next scheduled follow up, sooner if condition worsens.  Patient was given instructions and counseling regarding her condition or for health maintenance advice. Please see specific information pulled into the AVS if appropriate.

## 2025-01-10 LAB — BACTERIA SPEC AEROBE CULT: NORMAL

## 2025-01-15 ENCOUNTER — SPECIALTY PHARMACY (OUTPATIENT)
Dept: PHARMACY | Facility: TELEHEALTH | Age: 35
End: 2025-01-15
Payer: COMMERCIAL

## 2025-01-15 NOTE — PROGRESS NOTES
Specialty Pharmacy Patient Management Program  Initial Assessment and Reassessment     Dina Lyons is a 34 y.o. female with migraines and enrolled in the Patient Management program offered by [facility]. An initial outreach was conducted, including assessment of therapy appropriateness and specialty medication education for Qulipta. The patient is also enrolled with migraines and a follow-up outreach was conducted, including assessment of continued therapy appropriateness, medication adherence, and side effect incidence and management for nurtec.     Insurance Coverage & Financial Support, including any changes  Billing [employer]/Practice Management e-Tools and [employer] co-pay card    Relevant Past Medical History and Comorbidities  Relevant medical history and concomitant health conditions were discussed with the patient. The patient's chart has been reviewed for relevant past medical history and comorbid health conditions and updated as necessary.   Past Medical History:   Diagnosis Date    Abscess of right thigh 12/15/2022    Anxiety and depression     Bronchitis 03/31/2022    Carpal tunnel syndrome     Family history of complication of anesthesia     MOTHER HAS SEVERE NAUSEA    History of migraine     Left sided numbness 07/05/2022    Left-sided weakness 07/05/2022    Migraine     Ovarian cyst     RIGHT    Ovarian tumor (benign), left     Pyelonephritis     Seasonal asthma      Social History     Socioeconomic History    Marital status: Single   Tobacco Use    Smoking status: Former     Current packs/day: 0.00     Average packs/day: 0.5 packs/day for 15.0 years (7.5 ttl pk-yrs)     Types: Cigarettes     Start date: 4/8/2006     Quit date: 4/8/2021     Years since quitting: 3.7     Passive exposure: Past    Smokeless tobacco: Never    Tobacco comments:     none in the past 4 weeks   Vaping Use    Vaping status: Every Day    Substances: Nicotine, Flavoring    Devices: Pre-filled or refillable cartridge,  [facility]   Substance and Sexual Activity    Alcohol use: Yes     Comment: occasionally    Drug use: Never    Sexual activity: Defer     Problem list reviewed by Cam Maldonado [facility] on 1/15/2025 at 11:05 AM    Allergies  Known allergies and reactions were discussed with the patient. The patient's chart has been reviewed for allergy information and updated as necessary.   Allergies   Allergen Reactions    Codeine Nausea And Vomiting    Contrast Dye (Echo Or Unknown Ct/Mr) Hives     Patient has tolerated contrast dye when pretreated with benadryl    Keflex [Cephalexin] Hives    Levaquin [Levofloxacin] Swelling    Penicillins Hives    Sulfa Antibiotics Hives     @[facility]([phone]:)    Current Medication List  This medication list has been reviewed with the patient and evaluated for any interactions or necessary modifications/recommendations, and updated to include all prescription medications, OTC medications, and supplements the patient is currently taking. This list reflects what is contained in the patient's profile, which has also been marked as reviewed to communicate to other providers it is the most up to date version of the patient's current medication therapy.     Current Outpatient Medications:     Atogepant (Qulipta) 60 MG tablet, Take 1 tablet by mouth Daily., Disp: 30 tablet, Rfl: 5    acetaminophen (TYLENOL) 500 MG tablet, Take 1-2 tablets by mouth Every 6 (Six) Hours As Needed for Mild Pain., Disp: , Rfl:     busPIRone (BUSPAR) 5 MG tablet, Take 1 tablet by mouth 3 (Three) Times a Day As Needed for anxiety. If one tablet is not effective may increase to 2 tablets 3 times daily, Disp: 90 tablet, Rfl: 2    Diclofenac Sodium (VOLTAREN) 1 % gel gel, Apply 4 g topically to the appropriate area as directed 4 (Four) Times a Day As Needed (pain) for up to 30 days., Disp: 100 g, Rfl: 2    doxycycline (VIBRAMYCIN) 100 MG capsule, Take 1 capsule by mouth 2 (Two) Times a Day., Disp: 20 capsule, Rfl:  0    fluticasone (FLONASE) 50 MCG/ACT nasal spray, Administer 1 spray into the nostril(s) as directed by provider 2 (Two) Times a Day., Disp: 16 g, Rfl: 0    promethazine-dextromethorphan (PROMETHAZINE-DM) 6.25-15 MG/5ML syrup, Take 5 mL by mouth At Night As Needed for Cough., Disp: 120 mL, Rfl: 0    rimegepant sulfate (Nurtec) 75 MG tablet, Take 1 tablet by mouth as needed for migraine. max 1 tablet per 24 hours, Disp: 8 tablet, Rfl: 5  Medicines reviewed by Cam Maldonado RP on 1/15/2025 at 11:04 AM    Drug Interactions  none     Relevant Laboratory Values  Lab Results   Component Value Date    GLUCOSE 91 08/23/2024    CALCIUM 9.0 08/23/2024     08/23/2024    K 4.1 08/23/2024    CO2 25.4 08/23/2024     08/23/2024    BUN 9 08/23/2024    CREATININE 0.77 08/23/2024    BCR 11.7 08/23/2024    ANIONGAP 8.6 08/23/2024     Lab Results   Component Value Date    WBC 3.96 08/23/2024    HGB 13.5 08/23/2024    HCT 41.0 08/23/2024    MCV 88.4 08/23/2024     08/23/2024     Lab Value Review  The above lab values have been reviewed; the following specialty medication(s) dose adjustment(s) are recommended: none.    Initial Education Provided for new Specialty Medication  The patient has been provided with the following education and any applicable administration techniques (i.e. self-injection) have been demonstrated for the therapies indicated. All questions and concerns have been addressed prior to the patient receiving the medication, and the patient has verbalized understanding of the education and any materials provided. Additional patient education shall be provided and documented upon request by the patient, provider or payer.      Atogepant (Qulipta)        Medication Expectations   Why am I taking this medication? You are taking this medication for migraine prophylaxis.   What should I expect while on this medication? You should expect to a decrease in the frequency and severity of your migraines.    How does the medication work? Qulipta is a monoclonal antibody that binds to calcitonin gene-related peptide (CGRP) and blocks its binding to the receptor decreasing the severity of migraines.   How long will I be on this medication for? The amount of time you will be on this medication will be determined by your doctor and your response to the medication.    How do I take this medication? Take as directed on your prescription label.   Take one tablet daily with or without food.   What are some possible side effects? Potential side effects including, but not limited to nausea, constipation and fatigue. Pt verbalized understanding.   What happens if I miss a dose? If you miss a dose of this medicine, take it as soon as possible. However, if it is almost time for your next dose, skip the missed dose and go back to your regular dosing schedule. Do not double doses.                  Medication Safety   What are things I should warn my doctor immediately about? Hypersensitivity reactions, such as trouble breathing or swallowing.   What are things that I should be cautious of? Be cautious driving until you know how this drug will affect you.   What are some medications that can interact with this one? Ketoconazole, Itraconazole, cyclosporin, clarithromycin, rifampin, carbamazepine, phenytion, Coeburn's Wort, efavirenz, and etravine.            Medication Storage/Handling   How should I handle this medication? Keep this medication our of reach of pets/children in original container.   How does this medication need to be stored? Store at room temperature away from heat/cold, sunlight or moisture.   How should I dispose of this medication? There should not be a need to dispose of this medication unless your provider decides to change the dose or therapy. If that is the case, take to your local police station for proper disposal. Some pharmacies also have take-back bins for medication drop-off.              Resources/Support   How can I remind myself to take this medication? You can download reminder apps to help you manage your refills. You may also set an alarm on your phone to remind you. The pharmacy carries pill boxes that you can place next to an area you pass everyday (such as where you place your car keys or where you charge your phone)   Is financial support available?  Yes, [employer] can provide co-pay cards if you have commercial insurance or patient assistance if you have Medicare or no insurance.    Which vaccines are recommended for me? Talk to your doctor about these vaccines: Flu, Coronavirus (COVID-19), Pneumococcal (pneumonia), Tdap, Hepatitis B, Zoster (shingles)        Adherence, Self-Administration, and Current Therapy Problems  Adherence related to the patient's specialty therapy was discussed with the patient. The Adherence segment of this outreach has been reviewed and updated.     Adherence Questions  Linked Medication(s) Assessed: Rimegepant Sulfate (NURTEC)  On average, how many doses/injections does the patient miss per month?: 0 (Nurtec PRN medication)  What are the identified reasons for non-adherence or missed doses? : no problems identified  What is the estimated medication adherence level?: %  Based on the patient/caregiver response and refill history, does this patient require an MTP to track adherence improvements?: no    Additional Barriers to Patient Self-Administration: none  Methods for Supporting Patient Self-Administration: none    Open Medication Therapy Problems  No medication therapy recommendations to display    Adverse Drug Reactions  Medication tolerability: Tolerating with no to minimal ADRs  Medication plan: Continue therapy with normal follow-up  Plan for ADR Management: none    Adherence and Self-Administration (currently medication)  Approximate Number of Doses Missed Since Last Assessment: none  Ongoing or New Barriers to Patient Adherence and/or  Self-Administration: none   Methods for Supporting Patient Adherence and/or Self-Administration: N/A     Goals of Therapy  Goals related to the patient's specialty therapy were discussed with the patient. The Patient Goals segment of this outreach has been reviewed and updated.   Goals Addressed Today        Specialty Pharmacy General Goal      Reduce the duration, severity, and number of migraine days per month by 50%              Progress Toward Meeting Patient-Identified Goals of Therapy: On Track  New Patient-Identified Goals, If Applicable:     Progress Toward Meeting Clinical Goals or Therapeutic Targets: On Track  New Clinical Goals or Therapeutic Targets, If Applicable:     Hospitalizations and Urgent Care Since Last Assessment  Hospitalizations or Admissions: none  ED Visits: none  Urgent Office Visits: seen 1/8/25 for ear infection     Quality of Life Assessment   Quality of Life related to the patient's enrollment in the patient management program and services provided was discussed with the patient. The QOL segment of this outreach has been reviewed and updated.  Quality of Life Improvement Scale: 8-Moderately better    Reassessment Plan & Follow-Up  Medication Therapy Changes: stopping Emgality and start Qulipta 60 mg daily.    Additional Plans, Therapy Recommendations, or Therapy Problems to Be Addressed: none   Patient states Emgality was not affective and felt the migraines got worse.  Pharmacist to perform regular reassessments no more than (6) months from the previous assessment.  Welcome information and patient satisfaction survey to be sent by retail team with patient's initial fill.  Care Coordinator to set up future refill outreaches, coordinate prescription delivery, and escalate clinical questions to pharmacist.     Attestation  I attest the patient was actively involved in and has agreed to the above plan of care. I attest that the initiated specialty medication(s) are appropriate for the  patient based on my assessment. If the prescribed therapy is at any point deemed not appropriate based on the current or future assessments, a consultation will be initiated with the patient's specialty care provider to determine the best course of action. The revised plan of therapy will be documented along with any required assessments and/or additional patient education provided.     Electronically signed by Cam Maldonado RPH, 01/15/25, 11:05 AM EST.

## 2025-01-20 ENCOUNTER — SPECIALTY PHARMACY (OUTPATIENT)
Dept: PHARMACY | Facility: TELEHEALTH | Age: 35
End: 2025-01-20
Payer: COMMERCIAL

## 2025-01-20 NOTE — PROGRESS NOTES
Specialty Pharmacy Refill Coordination Note     Dina is a 34 y.o. female contacted today regarding refills of  [facility] specialty medication(s).    Reviewed and verified with patient:       Specialty medication(s) and dose(s) confirmed: yes    Refill Questions      Flowsheet Row Most Recent Value   Changes to allergies? No   Changes to medications? Yes   [Patient now taking Qulipta]   New conditions or infections since last clinic visit No   Unplanned office visit, urgent care, ED, or hospital admission in the last 4 weeks  No   How does patient/caregiver feel medication is working? Very good   Financial problems or insurance changes  No   Since the previous refill, were any specialty medication doses or scheduled injections missed or delayed?  No   Does this patient require a clinical escalation to a pharmacist? Yes   [Patient now taking Qulipta]            Delivery Questions      Flowsheet Row Most Recent Value   Delivery method UPS  [UPS NEXT DAY AIR]   Delivery address verified with patient/caregiver? Yes  [UPS NEXT DAY AIR]   Delivery address Home  [UPS NEXT DAY AIR]   Number of medications in delivery 1   Medication(s) being filled and delivered Atogepant (Qulipta)   Doses left of specialty medications 4   Copay verified? Yes  [$0.00]   Copay amount $0.00   Copay form of payment No copayment ($0)   Ship Date 01/21/2025   Delivery Date 01/22/2025   Signature Required No                   Follow-up: 21 day(s)     Sunshine Aldridge, Pharmacy Technician  Specialty Pharmacy Technician

## 2025-02-11 ENCOUNTER — SPECIALTY PHARMACY (OUTPATIENT)
Dept: PHARMACY | Facility: TELEHEALTH | Age: 35
End: 2025-02-11
Payer: COMMERCIAL

## 2025-02-11 NOTE — PROGRESS NOTES
Specialty Pharmacy Patient Management Program  Refill Outreach     Dina was contacted today regarding refills of their medication(s).    Refill Questions      Flowsheet Row Most Recent Value   Changes to allergies? No   Changes to medications? No   New conditions or infections since last clinic visit No   Unplanned office visit, urgent care, ED, or hospital admission in the last 4 weeks  No   How does patient/caregiver feel medication is working? Very good   Financial problems or insurance changes  No   Since the previous refill, were any specialty medication doses or scheduled injections missed or delayed?  No   Does this patient require a clinical escalation to a pharmacist? No            Delivery Questions      Flowsheet Row Most Recent Value   Delivery method UPS  [UPS NEXT DAY AIR]   Delivery address verified with patient/caregiver? Yes  [UPS NEXT DAY AIR]   Delivery address Home  [UPS NEXT DAY AIR]   Number of medications in delivery 1   Medication(s) being filled and delivered Rimegepant Sulfate (NURTEC)   Doses left of specialty medications 4   Copay verified? Yes  [$0.00]   Copay amount $0.00   Copay form of payment No copayment ($0)   Ship Date 02/12/25   Delivery Date Selection 02/13/25   Signature Required No                 Follow-up: 21 day(s)     Sunshine Aldridge, Pharmacy Technician  2/11/2025  10:08 EST

## 2025-02-20 ENCOUNTER — OFFICE VISIT (OUTPATIENT)
Dept: FAMILY MEDICINE CLINIC | Age: 35
End: 2025-02-20
Payer: COMMERCIAL

## 2025-02-20 VITALS
WEIGHT: 233 LBS | TEMPERATURE: 98 F | SYSTOLIC BLOOD PRESSURE: 129 MMHG | DIASTOLIC BLOOD PRESSURE: 82 MMHG | HEART RATE: 84 BPM | BODY MASS INDEX: 36.57 KG/M2 | HEIGHT: 67 IN | OXYGEN SATURATION: 95 %

## 2025-02-20 DIAGNOSIS — Z86.0100 HISTORY OF COLON POLYPS: ICD-10-CM

## 2025-02-20 DIAGNOSIS — H60.391 OTHER INFECTIVE ACUTE OTITIS EXTERNA OF RIGHT EAR: Primary | ICD-10-CM

## 2025-02-20 PROCEDURE — 99213 OFFICE O/P EST LOW 20 MIN: CPT | Performed by: NURSE PRACTITIONER

## 2025-02-20 RX ORDER — AZITHROMYCIN 250 MG/1
TABLET, FILM COATED ORAL
Qty: 6 TABLET | Refills: 0 | Status: SHIPPED | OUTPATIENT
Start: 2025-02-20 | End: 2025-02-25

## 2025-02-20 RX ORDER — LORATADINE 10 MG/1
1 TABLET ORAL DAILY
COMMUNITY
Start: 2025-02-16

## 2025-02-20 RX ORDER — METHYLPREDNISOLONE 4 MG/1
TABLET ORAL
Qty: 21 TABLET | Refills: 0 | Status: SHIPPED | OUTPATIENT
Start: 2025-02-20

## 2025-02-20 RX ORDER — CLINDAMYCIN HYDROCHLORIDE 300 MG/1
1 CAPSULE ORAL DAILY
COMMUNITY
Start: 2025-02-16

## 2025-02-20 RX ORDER — NEOMYCIN SULFATE, POLYMYXIN B SULFATE AND HYDROCORTISONE 10; 3.5; 1 MG/ML; MG/ML; [USP'U]/ML
3 SUSPENSION/ DROPS AURICULAR (OTIC) 4 TIMES DAILY
Qty: 10 ML | Refills: 0 | Status: SHIPPED | OUTPATIENT
Start: 2025-02-20 | End: 2025-03-09

## 2025-02-20 NOTE — PROGRESS NOTES
"Dina Lyons presents to [facility] with complaint of  Earache (RT ear pain X 5 days /Went to  on Sunday, given clindamycin ) and Nausea (Nausea since starting clindamycin )    SUBJECTIVE  Earache   There is pain in the right ear. This is a new problem. Episode onset: 5 days ago. The problem occurs constantly. The problem has been worse. The maximum temperature recorded prior to her arrival was 100 - 101 F. The pain is severe. Pertinent negatives include no coughing, drainage, headaches or rhinorrhea. Associated symptoms comments: +nausea . She has tried antibiotics for the symptoms. The treatment provided no relief.   Additional information:Seen at , was RX clindamycin and has not seen any improvement     Patient is also asking for referral for colonoscopy.  Patient says she has history of colon polyps.  Was recommended to have colonoscopy every 5 years.    OBJECTIVE  Vital Signs:   /82 (BP Location: Right arm, Patient Position: Sitting, Cuff Size: Adult)   Pulse 84   Temp 98 °F (36.7 °C) (Oral)   Ht 170.2 cm (67.01\")   Wt 106 kg (233 lb)   SpO2 95%   BMI 36.48 kg/m²       Physical Exam  Vitals reviewed.   Constitutional:       General: She is not in acute distress.     Appearance: Normal appearance. She is not ill-appearing.   HENT:      Head: Normocephalic and atraumatic.      Left Ear: A middle ear effusion is present. Tympanic membrane is not erythematous or bulging.      Ears:      Comments: Right EAC is edematous and erythematous, TM not visible due to swelling, no drainage seen, mod amount of cerumen present      Nose: Nose normal.      Mouth/Throat:      Mouth: Mucous membranes are moist.      Pharynx: Oropharynx is clear.   Cardiovascular:      Rate and Rhythm: Normal rate and regular rhythm.      Pulses: Normal pulses.      Heart sounds: Normal heart sounds.   Pulmonary:      Effort: Pulmonary effort is normal.      Breath sounds: Normal breath sounds. "   Musculoskeletal:      Cervical back: Neck supple.   Skin:     General: Skin is warm and dry.   Neurological:      General: No focal deficit present.      Mental Status: She is alert and oriented to person, place, and time. Mental status is at baseline.   Psychiatric:         Mood and Affect: Mood normal.         Behavior: Behavior normal.         Judgment: Judgment normal.              ASSESSMENT AND PLAN:  Diagnoses and all orders for this visit:    1. Other infective acute otitis externa of right ear (Primary)  -     methylPREDNISolone (MEDROL) 4 MG dose pack; Take by mouth as directed by package instructions.  Dispense: 21 tablet; Refill: 0  -     azithromycin (Zithromax Z-Milton) 250 MG tablet; Take 2 tablets by mouth on day 1, then 1 tablet daily on days 2-5  Dispense: 6 tablet; Refill: 0  -     neomycin-polymyxin-hydrocortisone (CORTISPORIN) 3.5-28998-2 otic suspension; Administer 3 drops to the right ear 4 (Four) Times a Day for 7 days.  Dispense: 10 mL; Refill: 0    2. History of colon polyps  -     Ambulatory Referral to General Surgery      Patient will stop clindamycin and start Z-Milton and Cortisporin gtts.  Patient has penicillin allergy and Levaquin allergy so standard treatment of Augmentin and ofloxacin drops cannot be used.  May also try Flonase and use heating pad for comfort.  If she does not see improvement after 1 week, follow-up in office.  Referral placed to general surgery for colonoscopy.  Patient prefers to have done at hospital here in [facility].    Discussed with patient that I am transferring to [facility] primary care office in [place].  Patient declined scheduling establish care appointment here.  Patient says that she may follow-up provider to [place].  She will call for appointment when needed.       Follow Up   Return if symptoms worsen or fail to improve. Patient to notify office with any acute concerns or issues.  Patient verbalizes understanding, agrees with plan of care and has  no further questions upon discharge.     Patient was given instructions and counseling regarding her condition or for health maintenance advice. Please see specific information pulled into the AVS if appropriate.     Discussed the importance of following up with any needed screening tests/labs/specialist appointments and any requested follow-up recommended by me today. Importance of maintaining follow-up discussed and patient accepts that missed appointments can delay diagnosis and potentially lead to worsening of conditions.    Part of this note may be an electronic transcription/translation of spoken language to printed text using the Dragon Dictation System.

## 2025-02-20 NOTE — LETTER
February 20, 2025     Patient: Dina Lyons   YOB: 1990   Date of Visit: 2/20/2025       To Whom It May Concern:    It is my medical opinion that Dina Lyons may be excused 2/20/25 and 2/21/25. If she feels well enough however, she may return 2/21/25.          Sincerely,          JAMES Rodrigues    CC: No Recipients

## 2025-02-24 DIAGNOSIS — H60.391 OTHER INFECTIVE ACUTE OTITIS EXTERNA OF RIGHT EAR: ICD-10-CM

## 2025-02-26 RX ORDER — AZITHROMYCIN 250 MG/1
TABLET, FILM COATED ORAL
Qty: 6 TABLET | Refills: 0 | OUTPATIENT
Start: 2025-02-26 | End: 2025-03-02

## 2025-02-28 ENCOUNTER — OFFICE VISIT (OUTPATIENT)
Dept: FAMILY MEDICINE CLINIC | Age: 35
End: 2025-02-28
Payer: COMMERCIAL

## 2025-02-28 VITALS
BODY MASS INDEX: 37.04 KG/M2 | TEMPERATURE: 97.9 F | OXYGEN SATURATION: 98 % | DIASTOLIC BLOOD PRESSURE: 97 MMHG | HEIGHT: 67 IN | SYSTOLIC BLOOD PRESSURE: 136 MMHG | HEART RATE: 118 BPM | WEIGHT: 236 LBS

## 2025-02-28 DIAGNOSIS — H60.311 ACUTE DIFFUSE OTITIS EXTERNA OF RIGHT EAR: Primary | ICD-10-CM

## 2025-02-28 DIAGNOSIS — H92.11 DRAINAGE FROM RIGHT EAR: ICD-10-CM

## 2025-02-28 PROCEDURE — 87077 CULTURE AEROBIC IDENTIFY: CPT | Performed by: NURSE PRACTITIONER

## 2025-02-28 PROCEDURE — 87186 SC STD MICRODIL/AGAR DIL: CPT | Performed by: NURSE PRACTITIONER

## 2025-02-28 PROCEDURE — 99213 OFFICE O/P EST LOW 20 MIN: CPT | Performed by: NURSE PRACTITIONER

## 2025-02-28 PROCEDURE — 87205 SMEAR GRAM STAIN: CPT | Performed by: NURSE PRACTITIONER

## 2025-02-28 PROCEDURE — 87070 CULTURE OTHR SPECIMN AEROBIC: CPT | Performed by: NURSE PRACTITIONER

## 2025-02-28 RX ORDER — AZITHROMYCIN 250 MG/1
250 TABLET, FILM COATED ORAL DAILY
Qty: 5 TABLET | Refills: 0 | Status: SHIPPED | OUTPATIENT
Start: 2025-02-28 | End: 2025-03-05

## 2025-02-28 RX ORDER — OFLOXACIN 3 MG/ML
10 SOLUTION AURICULAR (OTIC) DAILY
Qty: 10 ML | Refills: 0 | Status: SHIPPED | OUTPATIENT
Start: 2025-02-28

## 2025-02-28 NOTE — PROGRESS NOTES
"Dina Lyons presents to [facility] with complaint of  Ear Fullness (Trouble hearing out of RT ear after infection X 2 weeks )    SUBJECTIVE  History of Present Illness    Patient is here with continued ear infection symptoms.  She was seen in the office last week and was found to have infective otitis externa.  Patient was initially seen at urgent care prior to that visit and was treated with clindamycin.  Patient also had ear infection in January and was treated with doxycycline.  At her visit last week, she was prescribed azithromycin, Medrol Dosepak and Cortisporin eardrops.  Patient says that her ear pain has significantly improved however she continues to have difficulty hearing out of her ear and has a significant amount of drainage from her ear which is yellow or purulent.  Patient continues to use Flonase.  Patient also endorses feeling dizzy at times.    OBJECTIVE  Vital Signs:   /97 (BP Location: Left arm, Patient Position: Sitting, Cuff Size: Adult)   Pulse 118   Temp 97.9 °F (36.6 °C) (Oral)   Ht 170.2 cm (67.01\")   Wt 107 kg (236 lb)   SpO2 98%   BMI 36.95 kg/m²       Physical Exam  Vitals reviewed.   Constitutional:       General: She is not in acute distress.     Appearance: Normal appearance. She is not ill-appearing.   HENT:      Head: Normocephalic and atraumatic.      Right Ear: Drainage and swelling present. No tenderness.      Left Ear: Tympanic membrane and ear canal normal.      Ears:      Comments: Right external auditory canal still somewhat erythematous but is improved, TM not visible due to excessive yellow drainage     Nose: Nose normal.      Mouth/Throat:      Mouth: Mucous membranes are moist.      Pharynx: Oropharynx is clear.   Cardiovascular:      Rate and Rhythm: Normal rate.   Pulmonary:      Effort: Pulmonary effort is normal.   Musculoskeletal:      Cervical back: Neck supple.   Skin:     General: Skin is warm and dry.   Neurological: "      General: No focal deficit present.      Mental Status: She is alert and oriented to person, place, and time. Mental status is at baseline.   Psychiatric:         Mood and Affect: Mood normal.         Behavior: Behavior normal.         Judgment: Judgment normal.              ASSESSMENT AND PLAN:  Diagnoses and all orders for this visit:    1. Acute diffuse otitis externa of right ear (Primary)  -     Ambulatory Referral to ENT (Otolaryngology)    2. Drainage from right ear  -     Wound Culture - Drainage, Ear; Future  -     Wound Culture - Drainage, Ear  -     Ambulatory Referral to ENT (Otolaryngology)    Other orders  -     ofloxacin (FLOXIN) 0.3 % otic solution; Administer 10 drops to the right ear Daily.  Dispense: 10 mL; Refill: 0  -     azithromycin (ZITHROMAX) 250 MG tablet; Take 1 tablet by mouth Daily for 5 days.  Dispense: 5 tablet; Refill: 0      Patient has had improvement in her ear pain however she continues to have large amount of purulent ear drainage.  Culture was obtained.  She also has difficulty hearing.  She will continue azithromycin for an additional 5 days.  She was also prescribed ofloxacin eardrops.  An urgent referral was also placed to ENT given the chronicity of her symptoms.  She may require ear wicking which was explained to the patient today.            Follow Up   No follow-ups on file. Patient to notify office with any acute concerns or issues.  Patient verbalizes understanding, agrees with plan of care and has no further questions upon discharge.     Patient was given instructions and counseling regarding her condition or for health maintenance advice. Please see specific information pulled into the AVS if appropriate.     Discussed the importance of following up with any needed screening tests/labs/specialist appointments and any requested follow-up recommended by me today. Importance of maintaining follow-up discussed and patient accepts that missed appointments can delay  diagnosis and potentially lead to worsening of conditions.    Part of this note may be an electronic transcription/translation of spoken language to printed text using the Dragon Dictation System.

## 2025-03-04 LAB
BACTERIA SPEC AEROBE CULT: ABNORMAL
GRAM STN SPEC: ABNORMAL

## 2025-03-07 ENCOUNTER — SPECIALTY PHARMACY (OUTPATIENT)
Dept: PHARMACY | Facility: TELEHEALTH | Age: 35
End: 2025-03-07
Payer: COMMERCIAL

## 2025-03-07 NOTE — PROGRESS NOTES
Specialty Pharmacy Patient Management Program  Refill Outreach     Dina was contacted today regarding refills of their medication(s).    Refill Questions      Flowsheet Row Most Recent Value   Changes to allergies? No   Changes to medications? No   New conditions or infections since last clinic visit No   Unplanned office visit, urgent care, ED, or hospital admission in the last 4 weeks  No   How does patient/caregiver feel medication is working? Very good   Financial problems or insurance changes  No   Since the previous refill, were any specialty medication doses or scheduled injections missed or delayed?  No   Does this patient require a clinical escalation to a pharmacist? No            Delivery Questions      Flowsheet Row Most Recent Value   Delivery method UPS  [UPS NEXT DAY AIR]   Delivery address verified with patient/caregiver? Yes  [UPS NEXT DAY AIR]   Delivery address Home  [UPS NEXT DAY AIR]   Number of medications in delivery 1   Medication(s) being filled and delivered Rimegepant Sulfate (NURTEC-ODT)   Doses left of specialty medications 4   Copay verified? Yes  [$0.00]   Copay amount $0.00   Copay form of payment No copayment ($0)   Delivery Date Selection 03/11/25   Signature Required No                 Follow-up: 21 day(s)     Sunshine Aldridge, Pharmacy Technician  3/7/2025  11:14 EST

## 2025-03-25 ENCOUNTER — TRANSCRIBE ORDERS (OUTPATIENT)
Dept: ADMINISTRATIVE | Facility: HOSPITAL | Age: 35
End: 2025-03-25
Payer: COMMERCIAL

## 2025-03-25 DIAGNOSIS — M25.462 KNEE EFFUSION, LEFT: Primary | ICD-10-CM

## 2025-04-07 ENCOUNTER — HOSPITAL ENCOUNTER (OUTPATIENT)
Dept: MRI IMAGING | Facility: HOSPITAL | Age: 35
Discharge: HOME | End: 2025-04-07
Admitting: INTERNAL MEDICINE
Payer: COMMERCIAL

## 2025-04-07 ENCOUNTER — SPECIALTY PHARMACY (OUTPATIENT)
Dept: PHARMACY | Facility: TELEHEALTH | Age: 35
End: 2025-04-07
Payer: COMMERCIAL

## 2025-04-07 DIAGNOSIS — M25.462 KNEE EFFUSION, LEFT: ICD-10-CM

## 2025-04-07 PROCEDURE — 73721 MRI JNT OF LWR EXTRE W/O DYE: CPT

## 2025-04-07 NOTE — PROGRESS NOTES
Specialty Pharmacy Patient Management Program  [facility] Refill Outreach       Dina was contacted today regarding refills of their Qulipta , Nurtec medication(s).        Refill Questions      Flowsheet Row Most Recent Value   Changes to allergies? No   Changes to medications? No   New conditions or infections since last clinic visit No   Unplanned office visit, urgent care, ED, or hospital admission in the last 4 weeks  No   How does patient/caregiver feel medication is working? Very good   Financial problems or insurance changes  No   Since the previous refill, were any specialty medication doses or scheduled injections missed or delayed?  No   Does this patient require a clinical escalation to a pharmacist? No            Delivery Questions      Flowsheet Row Most Recent Value   Delivery method UPS   Delivery address verified with patient/caregiver? Yes   Delivery address Home   Other address preferred N/A   Number of medications in delivery 2   Medication(s) being filled and delivered Atogepant (Qulipta), Rimegepant Sulfate (NURTEC-ODT)   Doses left of specialty medications Qulipta - 2 Tablets , Nurtec - 1 Tablet   Copay verified? Yes   Copay amount $0.00   Copay form of payment No copayment ($0)   Delivery Date Selection 04/08/25   Signature Required No                   Follow-up: 21 day(s)     Viktor Gray  4/7/2025  12:18 EDT

## 2025-04-22 ENCOUNTER — OFFICE VISIT (OUTPATIENT)
Dept: NEUROLOGY | Facility: CLINIC | Age: 35
End: 2025-04-22
Payer: COMMERCIAL

## 2025-04-22 VITALS
HEART RATE: 61 BPM | HEIGHT: 67 IN | DIASTOLIC BLOOD PRESSURE: 77 MMHG | BODY MASS INDEX: 36.46 KG/M2 | WEIGHT: 232.3 LBS | SYSTOLIC BLOOD PRESSURE: 143 MMHG

## 2025-04-22 DIAGNOSIS — G43.411 INTRACTABLE HEMIPLEGIC MIGRAINE WITH STATUS MIGRAINOSUS: Primary | ICD-10-CM

## 2025-04-22 RX ORDER — ATOGEPANT 60 MG/1
60 TABLET ORAL DAILY
Qty: 30 TABLET | Refills: 11 | Status: SHIPPED | OUTPATIENT
Start: 2025-04-22

## 2025-04-22 NOTE — PROGRESS NOTES
"Chief Complaint  Migraine    Opal Lyons presents to [facility]  History of Present Illness    History of Present Illness  The patient is a 34-year-old female who presents to the office for follow-up of migraine. She remains on Qulipta for preventative therapy and Nurtec as needed.    Improvement in headache symptoms is reported, attributed to the current medication regimen. Headaches are particularly severe during periods of inclement weather, to the extent that no treatment seems effective. However, the combination of Qulipta and Nurtec provides significant relief, often within minutes of administration.    INTERVAL: Since the last visit, she reports better control of her headaches with the current medications despite experiencing severe headaches during inclement weather.    MEDICATIONS  CURRENT MEDS:  Qulipta Daily  Nurtec As needed       Objective   Vital Signs:   /77   Pulse 61   Ht 170.2 cm (67.01\")   Wt 105 kg (232 lb 4.8 oz)   BMI 36.37 kg/m²     Physical Exam  HENT:      Head: Normocephalic.   Pulmonary:      Effort: Pulmonary effort is normal.   Neurological:      Mental Status: She is alert and oriented to person, place, and time.      Sensory: Sensation is intact.      Motor: Motor function is intact.      Coordination: Coordination is intact.      Deep Tendon Reflexes: Reflexes are normal and symmetric.        Neurological Exam  Mental Status  Alert. Oriented to person, place, and time.    Sensory  Normal sensation.    Reflexes  Deep tendon reflexes are 2+ and symmetric in all four extremities.    Coordination    Finger-to-nose, rapid alternating movements and heel-to-shin normal bilaterally without dysmetria.      Result Review :   CBC:  Lab Results   Component Value Date    WBC 3.96 08/23/2024    RBC 4.64 08/23/2024    HGB 13.5 08/23/2024    HCT 41.0 08/23/2024    MCV 88.4 08/23/2024    MCH 29.1 08/23/2024    MCHC 32.9 " 08/23/2024    RDW 12.6 08/23/2024     08/23/2024     CMP:  Lab Results   Component Value Date    BUN 9 08/23/2024    CREATININE 0.77 08/23/2024    EGFRIFNONA 108 04/27/2021     08/23/2024    K 4.1 08/23/2024     08/23/2024    CALCIUM 9.0 08/23/2024    ALBUMIN 4.2 08/23/2024    BILITOT 0.8 08/23/2024    ALKPHOS 64 08/23/2024    AST 25 08/23/2024    ALT 36 (H) 08/23/2024     TSH/FREE T4:   Lab Results   Component Value Date    TSH 1.510 03/23/2021                Assessment and Plan    Diagnoses and all orders for this visit:    1. Intractable hemiplegic migraine with status migrainosus (Primary)    Other orders  -     Atogepant (Qulipta) 60 MG tablet; Take 1 tablet by mouth Daily.  Dispense: 30 tablet; Refill: 11  -     rimegepant sulfate ODT (Nurtec) 75 MG disintegrating tablet; Take 1 tablet by mouth as needed for migraine. max 1 tablet per 24 hours  Dispense: 8 tablet; Refill: 11        Assessment & Plan  1. Migraine.  Her migraines have shown improvement with the current treatment regimen. She uses Qulipta for preventative therapy and Nurtec as needed. The combination of these medications has been effective in managing her symptoms. Prescriptions for Qulipta and Nurtec will be renewed and sent to the pharmacy. She is advised to continue with the current treatment plan. If there are any changes or worsening of her headaches, she should contact the office immediately.    Follow-up  The patient will follow up in 1 year.         Follow Up   Return in about 1 year (around 4/22/2026) for Migraine f/u.  Patient was given instructions and counseling regarding her condition or for health maintenance advice. Please see specific information pulled into the AVS if appropriate.       Patient or patient representative verbalized consent for the use of Ambient Listening during the visit with  JAMES Dumont for chart documentation. 4/22/2025  10:22 EDT

## 2025-05-02 ENCOUNTER — SPECIALTY PHARMACY (OUTPATIENT)
Dept: PHARMACY | Facility: TELEHEALTH | Age: 35
End: 2025-05-02
Payer: COMMERCIAL

## 2025-05-02 RX ORDER — RIMEGEPANT SULFATE 75 MG/75MG
75 TABLET, ORALLY DISINTEGRATING ORAL DAILY PRN
Qty: 8 TABLET | Refills: 5 | Status: CANCELLED | OUTPATIENT
Start: 2025-05-02

## 2025-05-02 NOTE — PROGRESS NOTES
Specialty Pharmacy Patient Management Program  Refill Outreach     Dina was contacted today regarding refills of their medication(s).    Refill Questions      Flowsheet Row Most Recent Value   Changes to allergies? No   Changes to medications? No   New conditions or infections since last clinic visit No   Unplanned office visit, urgent care, ED, or hospital admission in the last 4 weeks  No   How does patient/caregiver feel medication is working? Very good   Financial problems or insurance changes  No   Since the previous refill, were any specialty medication doses or scheduled injections missed or delayed?  No   Does this patient require a clinical escalation to a pharmacist? No            Delivery Questions      Flowsheet Row Most Recent Value   Delivery method UPS  [UPS NEXT DAY AIR]   Delivery address verified with patient/caregiver? Yes  [UPS NEXT DAY AIR]   Delivery address Home  [UPS NEXT DAY AIR]   Other address preferred N/A   Number of medications in delivery 1   Medication(s) being filled and delivered Rimegepant Sulfate (NURTEC-ODT)   Doses left of specialty medications 2   Copay verified? Yes  [$0.00]   Copay amount $0.00   Copay form of payment No copayment ($0)  [$0.00]   Delivery Date Selection 05/06/25   Signature Required No                 Follow-up: 21 day(s)     Sunshine Aldridge, Pharmacy Technician  5/2/2025  09:34 EDT

## 2025-05-30 ENCOUNTER — SPECIALTY PHARMACY (OUTPATIENT)
Dept: PHARMACY | Facility: TELEHEALTH | Age: 35
End: 2025-05-30
Payer: COMMERCIAL

## 2025-05-30 NOTE — PROGRESS NOTES
Specialty Pharmacy Patient Management Program  Refill Outreach     Dina was contacted today regarding refills of their medication(s).    Refill Questions      Flowsheet Row Most Recent Value   Changes to allergies? No   Changes to medications? Yes   [pt started taking phentermine]   New conditions or infections since last clinic visit No   Unplanned office visit, urgent care, ED, or hospital admission in the last 4 weeks  No   How does patient/caregiver feel medication is working? Very good   Financial problems or insurance changes  No   Since the previous refill, were any specialty medication doses or scheduled injections missed or delayed?  No   Does this patient require a clinical escalation to a pharmacist? Yes   [pt now taking phentermine]            Delivery Questions      Flowsheet Row Most Recent Value   Delivery method UPS   Delivery address verified with patient/caregiver? Yes   Delivery address Home   Other address preferred N/A   Number of medications in delivery 1   Medication(s) being filled and delivered Rimegepant Sulfate (NURTEC-ODT)   Doses left of specialty medications 4   Copay verified? Yes   Copay amount $0.00   Copay form of payment No copayment ($0)   Delivery Date Selection 06/03/25   Signature Required No   Do you consent to receive electronic handouts?  No                 Follow-up: 21 day(s)     Lisa De Luna, Pharmacy Technician  5/30/2025  11:47 EDT

## 2025-06-19 ENCOUNTER — TELEPHONE (OUTPATIENT)
Dept: FAMILY MEDICINE CLINIC | Age: 35
End: 2025-06-19
Payer: COMMERCIAL

## 2025-06-19 NOTE — TELEPHONE ENCOUNTER
*hub to relay*  Pt called to get established with another provider due to Raffy no longer being in office, pt will call back. Please schedule pt with another provider at their convenience.

## 2025-06-27 ENCOUNTER — SPECIALTY PHARMACY (OUTPATIENT)
Dept: PHARMACY | Facility: TELEHEALTH | Age: 35
End: 2025-06-27
Payer: COMMERCIAL

## 2025-06-27 NOTE — PROGRESS NOTES
Specialty Pharmacy Patient Management Program  Reassessment     Dina Lyons is a 34 y.o. female with migraines and enrolled in the Patient Management program offered by [facility]. A follow-up outreach was conducted, including assessment of continued therapy appropriateness, medication adherence, and side effect incidence and management for Nurtec and Qulipta.     Changes to Insurance Coverage or Financial Support  none    Relevant Past Medical History and Comorbidities  Relevant medical history and concomitant health conditions were discussed with the patient. The patient's chart has been reviewed for relevant past medical history and comorbid health conditions and updated as necessary.   Past Medical History:   Diagnosis Date    Abscess of right thigh 12/15/2022    Anxiety and depression     Bronchitis 2022    Carpal tunnel syndrome     Family history of complication of anesthesia     MOTHER HAS SEVERE NAUSEA    History of migraine     Left sided numbness 2022    Left-sided weakness 2022    Migraine     Ovarian cyst     RIGHT    Ovarian tumor (benign), left     Pyelonephritis     Seasonal asthma      Social History     Socioeconomic History    Marital status: Single   Tobacco Use    Smoking status: Former     Current packs/day: 0.00     Average packs/day: 0.5 packs/day for 15.0 years (7.5 ttl pk-yrs)     Types: Cigarettes     Start date: 2006     Quit date: 2021     Years since quittin.2     Passive exposure: Past    Smokeless tobacco: Never    Tobacco comments:     none in the past 4 weeks   Vaping Use    Vaping status: Former    Substances: Nicotine, Flavoring    Devices: Pre-filled or refillable cartridge, Refillable tank   Substance and Sexual Activity    Alcohol use: Yes     Comment: occasionally    Drug use: Never    Sexual activity: Defer     Problem list reviewed by Cam Maldonado RPH on 2025 at 10:33 AM    Allergies  Known allergies and  "reactions were discussed with the patient. The patient's chart has been reviewed for allergy information and updated as necessary.   Allergies   Allergen Reactions    Codeine Nausea And Vomiting    Contrast Dye (Echo Or Unknown Ct/Mr) Hives     Patient has tolerated contrast dye when pretreated with benadryl    Keflex [Cephalexin] Hives    Levaquin [Levofloxacin] Anaphylaxis     \"Throat swelling\"    Penicillins Hives    Sulfa Antibiotics Hives    Clindamycin Nausea Only     Allergies reviewed by Cam Maldonado RP on 6/27/2025 at 10:33 AM    Relevant Laboratory Values  Lab Results   Component Value Date    GLUCOSE 91 08/23/2024    CALCIUM 9.0 08/23/2024     08/23/2024    K 4.1 08/23/2024    CO2 25.4 08/23/2024     08/23/2024    BUN 9 08/23/2024    CREATININE 0.77 08/23/2024    BCR 11.7 08/23/2024    ANIONGAP 8.6 08/23/2024     Lab Results   Component Value Date    WBC 3.96 08/23/2024    HGB 13.5 08/23/2024    HCT 41.0 08/23/2024    MCV 88.4 08/23/2024     08/23/2024     Lab Value Review  The above lab values have been reviewed; the following specialty medication(s) dose adjustment(s) are recommended: none.    Current Medication List  This medication list has been reviewed with the patient and evaluated for any interactions or necessary modifications/recommendations, and updated to include all prescription medications, OTC medications, and supplements the patient is currently taking. This list reflects what is contained in the patient's profile, which has also been marked as reviewed to communicate to other providers it is the most up to date version of the patient's current medication therapy.     Current Outpatient Medications:     acetaminophen (TYLENOL) 500 MG tablet, Take 1-2 tablets by mouth Every 6 (Six) Hours As Needed for Mild Pain., Disp: , Rfl:     Atogepant (Qulipta) 60 MG tablet, Take 1 tablet by mouth Daily., Disp: 30 tablet, Rfl: 11    busPIRone (BUSPAR) 5 MG tablet, Take 1 tablet " by mouth 3 (Three) Times a Day As Needed for anxiety. If one tablet is not effective may increase to 2 tablets 3 times daily, Disp: 90 tablet, Rfl: 2    fluticasone (FLONASE) 50 MCG/ACT nasal spray, Administer 1 spray into the nostril(s) as directed by provider 2 (Two) Times a Day., Disp: 16 g, Rfl: 0    ofloxacin (FLOXIN) 0.3 % otic solution, Administer 10 drops to the right ear Daily., Disp: 10 mL, Rfl: 0    ondansetron (ZOFRAN) 4 MG tablet, Take 1 tablet by mouth Every 6 (Six) Hours As Needed for nausea or vomiting, Disp: 30 tablet, Rfl: 0    phentermine 30 MG capsule, Take 1 capsule by mouth Every Morning. Max Daily Amount: 30 mg, Disp: 30 capsule, Rfl: 0    rimegepant sulfate ODT (Nurtec) 75 MG disintegrating tablet, Take 1 tablet by mouth as needed for migraine. Max of 1 tablet in 24 hours, Disp: 8 tablet, Rfl: 11  Medicines reviewed by Cam Maldonado [facility] on 6/27/2025 at 10:33 AM    Drug Interactions  none     Adverse Drug Reactions  Medication tolerability: Tolerating with no to minimal ADRs  Medication plan: Continue therapy with normal follow-up  Plan for ADR Management: none    Hospitalizations and Urgent Care Since Last Assessment  Hospitalizations or Admissions: none  ED Visits: none  Urgent Office Visits: none     Adherence, Self-Administration, and Current Therapy Problems  Adherence related to the patient's specialty therapy was discussed with the patient. The Adherence segment of this outreach has been reviewed and updated.     Adherence Questions  Linked Medication(s) Assessed: Atogepant (Qulipta), Rimegepant Sulfate (NURTEC-ODT)  On average, how many doses/injections does the patient miss per month?: 0 (Nurtec PRN medication)  What are the identified reasons for non-adherence or missed doses? : no problems identified  What is the estimated medication adherence level?: %  Based on the patient/caregiver response and refill history, does this patient require an MTP to track adherence  improvements?: no    Additional Barriers to Patient Self-Administration: none  Methods for Supporting Patient Self-Administration: none    Open Medication Therapy Problems  No medication therapy recommendations to display    Goals of Therapy  Goals related to the patient's specialty therapy were discussed with the patient. The Patient Goals segment of this outreach has been reviewed and updated.   Goals Addressed Today        Specialty Pharmacy General Goal      Reduce the duration, severity, and number of migraine days per month by 50%              Progress Toward Meeting Patient-Identified Goals of Therapy: On Track  New Patient-Identified Goals, If Applicable:     Progress Toward Meeting Clinical Goals or Therapeutic Targets: On Track  New Clinical Goals or Therapeutic Targets, If Applicable:     Quality of Life Assessment   Quality of Life related to the patient's enrollment in the patient management program and services provided was discussed with the patient. The QOL segment of this outreach has been reviewed and updated.  Quality of Life Improvement Scale: 8-Moderately better    Reassessment Plan & Follow-Up  Medication Therapy Changes: none  Additional Plans, Therapy Recommendations, or Therapy Problems to Be Addressed: none   Pharmacist to perform regular reassessments no more than (6) months from the previous assessment.  Care Coordinator to set up future refill outreaches, coordinate prescription delivery, and escalate clinical questions to pharmacist.     Attestation  I attest the patient was actively involved in and has agreed to the above plan of care. I attest that the specialty medication(s) addressed above are appropriate for the patient based on my reassessment. If the prescribed therapy is at any point deemed not appropriate based on the current or future assessments, a consultation will be initiated with the patient's specialty care provider to determine the best course of action. The revised  plan of therapy will be documented along with any required assessments and/or additional patient education provided.     Electronically signed by Cam Maldonado RPH, 06/27/25, 10:34 AM EDT.

## 2025-06-27 NOTE — PROGRESS NOTES
Specialty Pharmacy Patient Management Program  Call Center Refill Outreach      Dina is a 34 y.o. female contacted today regarding refills of her medication(s).    Specialty medication(s) and dose(s) confirmed: Nurtec and Qulipta  Other medications being refilled: none    Refill Questions      Flowsheet Row Most Recent Value   Changes to allergies? No   Changes to medications? No   New conditions or infections since last clinic visit No   Unplanned office visit, urgent care, ED, or hospital admission in the last 4 weeks  No   How does patient/caregiver feel medication is working? Very good   Financial problems or insurance changes  No   Since the previous refill, were any specialty medication doses or scheduled injections missed or delayed?  No   Does this patient require a clinical escalation to a pharmacist? No            Delivery Questions      Flowsheet Row Most Recent Value   Delivery method UPS   Delivery address verified with patient/caregiver? Yes   Delivery address Home   Other address preferred N/A   Number of medications in delivery 2   Medication(s) being filled and delivered Atogepant (Qulipta), Rimegepant Sulfate (NURTEC-ODT)   Doses left of specialty medications some left of each   Copay verified? Yes   Copay amount $0.00   Copay form of payment No copayment ($0)   Delivery Date Selection 07/01/25   Signature Required No   Do you consent to receive electronic handouts?  Yes                   Follow-up: 25 days     Cam Maldonado [facility]  Specialty Pharmacist  6/27/2025  10:35 EDT

## 2025-07-25 ENCOUNTER — SPECIALTY PHARMACY (OUTPATIENT)
Dept: PHARMACY | Facility: TELEHEALTH | Age: 35
End: 2025-07-25
Payer: COMMERCIAL

## 2025-07-25 NOTE — PROGRESS NOTES
Specialty Pharmacy Patient Management Program  Refill Outreach     Dina was contacted today regarding refills of their Nurtec medication(s).    Refill Questions      Flowsheet Row Most Recent Value   Changes to allergies? No   Changes to medications? No   New conditions or infections since last clinic visit No   Unplanned office visit, urgent care, ED, or hospital admission in the last 4 weeks  No   How does patient/caregiver feel medication is working? Very good   Financial problems or insurance changes  No   Since the previous refill, were any specialty medication doses or scheduled injections missed or delayed?  No   Does this patient require a clinical escalation to a pharmacist? No            Delivery Questions      Flowsheet Row Most Recent Value   Delivery method UPS   Delivery address verified with patient/caregiver? Yes   Delivery address Home   Other address preferred N/A   Number of medications in delivery 1   Medication(s) being filled and delivered Rimegepant Sulfate (NURTEC-ODT)   Doses left of specialty medications 3 Tablets   Copay verified? Yes   Copay amount $0.00   Copay form of payment No copayment ($0)   Delivery Date Selection 07/29/25   Signature Required No                 Follow-up: 21 day(s)     Viktor Gray  7/25/2025  09:27 EDT

## 2025-07-30 ENCOUNTER — PATIENT MESSAGE (OUTPATIENT)
Dept: NEUROLOGY | Facility: CLINIC | Age: 35
End: 2025-07-30
Payer: COMMERCIAL

## 2025-08-20 ENCOUNTER — PRIOR AUTHORIZATION (OUTPATIENT)
Dept: NEUROLOGY | Facility: CLINIC | Age: 35
End: 2025-08-20
Payer: COMMERCIAL

## 2025-08-25 ENCOUNTER — SPECIALTY PHARMACY (OUTPATIENT)
Dept: PHARMACY | Facility: TELEHEALTH | Age: 35
End: 2025-08-25
Payer: COMMERCIAL

## (undated) DEVICE — INTENDED FOR TISSUE SEPARATION, AND OTHER PROCEDURES THAT REQUIRE A SHARP SURGICAL BLADE TO PUNCTURE OR CUT.: Brand: BARD-PARKER ® CARBON RIB-BACK BLADES

## (undated) DEVICE — GAUZE,SPONGE,4"X4",16PLY,STRL,LF,10/TRAY: Brand: MEDLINE

## (undated) DEVICE — UNDERCAST PADDING: Brand: DEROYAL

## (undated) DEVICE — EXTREMITY-LF: Brand: MEDLINE INDUSTRIES, INC.

## (undated) DEVICE — DRSNG GZ PETROLTM XEROFORM CURAD 1X8IN STRL

## (undated) DEVICE — BNDG ELAS ECON W/CLIP 4IN 5YD LF STRL

## (undated) DEVICE — SYR LUERLOK 30CC

## (undated) DEVICE — APPL CHLORAPREP HI/LITE 26ML ORNG

## (undated) DEVICE — UNDYED BRAIDED (POLYGLACTIN 910), SYNTHETIC ABSORBABLE SUTURE: Brand: COATED VICRYL

## (undated) DEVICE — ENDOPATH XCEL BLADELESS TROCARS WITH STABILITY SLEEVES: Brand: ENDOPATH XCEL

## (undated) DEVICE — ENDOPATH XCEL UNIVERSAL TROCAR STABLILITY SLEEVES: Brand: ENDOPATH XCEL

## (undated) DEVICE — GLV SURG SENSICARE PI ORTHO SZ8 LF STRL

## (undated) DEVICE — BLD SCLPL RIBBACK C/SS SZ15

## (undated) DEVICE — SPNG GZ STRL 2S 4X4 16PLY

## (undated) DEVICE — GLV SURG SENSICARE SLT PF LF 8 STRL

## (undated) DEVICE — NDL HYPO ECLPS SFTY 22G 1IN

## (undated) DEVICE — PK LAP GYN TOWER 40

## (undated) DEVICE — STERILE POLYISOPRENE POWDER-FREE SURGICAL GLOVES WITH EMOLLIENT COATING: Brand: PROTEXIS

## (undated) DEVICE — 40585 XL ADVANCED TRENDELENBURG POSITIONING KIT: Brand: 40585 XL ADVANCED TRENDELENBURG POSITIONING KIT

## (undated) DEVICE — BLD OPTH BEAVER/MINIBLADE STR 180DEG DBL/BVL SS

## (undated) DEVICE — BNDG ESMARK 4IN 12FT LF STRL BLU

## (undated) DEVICE — ENDOCUT SCISSOR TIP, DISPOSABLE: Brand: RENEW

## (undated) DEVICE — GLV SURG PROTEXIS PI BLU NEUTHERA PF 6.5

## (undated) DEVICE — DEV SUT GRSPR CLOSUR 15CM 14G

## (undated) DEVICE — GLV SURG SIGNATURE ESSENTIAL PF LTX SZ7.5

## (undated) DEVICE — PK EXTREM CUST HAR

## (undated) DEVICE — PAD CAST SPECIST STRL 4IN

## (undated) DEVICE — PATIENT RETURN ELECTRODE, SINGLE-USE, CONTACT QUALITY MONITORING, ADULT, WITH 9FT CORD, FOR PATIENTS WEIGING OVER 33LBS. (15KG): Brand: MEGADYNE

## (undated) DEVICE — SUT ETHLN 4/0 FS2 18IN 662H

## (undated) DEVICE — BLUNT TIP LAPAROSCOPIC SEALER/DIVIDER NANO-COATED: Brand: LIGASURE

## (undated) DEVICE — GLV SURG SENSICARE SLT PF LF 6 STRL

## (undated) DEVICE — GLV SURG SIGNATURE ESSENTIAL PF LTX SZ8

## (undated) DEVICE — ENDOPOUCH RETRIEVER SPECIMEN RETRIEVAL BAGS: Brand: ENDOPOUCH RETRIEVER